# Patient Record
Sex: FEMALE | Race: WHITE | HISPANIC OR LATINO | Employment: FULL TIME | URBAN - NONMETROPOLITAN AREA
[De-identification: names, ages, dates, MRNs, and addresses within clinical notes are randomized per-mention and may not be internally consistent; named-entity substitution may affect disease eponyms.]

---

## 2020-07-05 ENCOUNTER — APPOINTMENT (EMERGENCY)
Dept: RADIOLOGY | Facility: HOSPITAL | Age: 40
End: 2020-07-05
Payer: COMMERCIAL

## 2020-07-05 ENCOUNTER — HOSPITAL ENCOUNTER (INPATIENT)
Facility: HOSPITAL | Age: 40
LOS: 1 days | Discharge: HOME | End: 2020-07-06
Attending: EMERGENCY MEDICINE | Admitting: FAMILY MEDICINE
Payer: COMMERCIAL

## 2020-07-05 DIAGNOSIS — E87.2 LACTIC ACIDOSIS: ICD-10-CM

## 2020-07-05 DIAGNOSIS — F10.230 ALCOHOL WITHDRAWAL SYNDROME WITHOUT COMPLICATION (HCC): Primary | ICD-10-CM

## 2020-07-05 PROBLEM — Z72.0 TOBACCO ABUSE: Status: ACTIVE | Noted: 2020-07-05

## 2020-07-05 PROBLEM — E87.20 LACTIC ACIDOSIS: Status: ACTIVE | Noted: 2020-07-05

## 2020-07-05 PROBLEM — R74.01 TRANSAMINITIS: Status: ACTIVE | Noted: 2020-07-05

## 2020-07-05 PROBLEM — F10.239 ALCOHOL WITHDRAWAL SYNDROME WITH COMPLICATION (HCC): Status: ACTIVE | Noted: 2020-07-05

## 2020-07-05 PROBLEM — I10 ESSENTIAL HYPERTENSION: Status: ACTIVE | Noted: 2020-07-05

## 2020-07-05 PROBLEM — F10.939 ALCOHOL WITHDRAWAL SYNDROME WITH COMPLICATION (HCC): Status: ACTIVE | Noted: 2020-07-05

## 2020-07-05 PROBLEM — R11.2 INTRACTABLE NAUSEA AND VOMITING: Status: ACTIVE | Noted: 2020-07-05

## 2020-07-05 LAB
ALBUMIN SERPL BCP-MCNC: 3.9 G/DL (ref 3.5–5)
ALP SERPL-CCNC: 105 U/L (ref 46–116)
ALT SERPL W P-5'-P-CCNC: 74 U/L (ref 12–78)
ANION GAP SERPL CALCULATED.3IONS-SCNC: 21 MMOL/L (ref 4–13)
AST SERPL W P-5'-P-CCNC: 195 U/L (ref 5–45)
BASOPHILS # BLD MANUAL: 0 THOUSAND/UL (ref 0–0.1)
BASOPHILS NFR MAR MANUAL: 0 % (ref 0–1)
BILIRUB SERPL-MCNC: 1.56 MG/DL (ref 0.2–1)
BUN SERPL-MCNC: 6 MG/DL (ref 5–25)
CALCIUM SERPL-MCNC: 8 MG/DL (ref 8.3–10.1)
CHLORIDE SERPL-SCNC: 94 MMOL/L (ref 100–108)
CK MB SERPL-MCNC: 0.6 NG/ML (ref 0–5)
CK MB SERPL-MCNC: <1 % (ref 0–2.5)
CK SERPL-CCNC: 182 U/L (ref 26–192)
CO2 SERPL-SCNC: 21 MMOL/L (ref 21–32)
CREAT SERPL-MCNC: 1.12 MG/DL (ref 0.6–1.3)
EOSINOPHIL # BLD MANUAL: 0 THOUSAND/UL (ref 0–0.4)
EOSINOPHIL NFR BLD MANUAL: 0 % (ref 0–6)
ERYTHROCYTE [DISTWIDTH] IN BLOOD BY AUTOMATED COUNT: 14.3 % (ref 11.6–15.1)
ETHANOL SERPL-MCNC: <3 MG/DL (ref 0–3)
GFR SERPL CREATININE-BSD FRML MDRD: 62 ML/MIN/1.73SQ M
GLUCOSE SERPL-MCNC: 126 MG/DL (ref 65–140)
HCT VFR BLD AUTO: 41.2 % (ref 34.8–46.1)
HGB BLD-MCNC: 14.7 G/DL (ref 11.5–15.4)
LACTATE SERPL-SCNC: 1.9 MMOL/L (ref 0.5–2)
LACTATE SERPL-SCNC: 7.4 MMOL/L (ref 0.5–2)
LIPASE SERPL-CCNC: 206 U/L (ref 73–393)
LYMPHOCYTES # BLD AUTO: 1.9 THOUSAND/UL (ref 0.6–4.47)
LYMPHOCYTES # BLD AUTO: 32 % (ref 14–44)
MCH RBC QN AUTO: 33.7 PG (ref 26.8–34.3)
MCHC RBC AUTO-ENTMCNC: 35.7 G/DL (ref 31.4–37.4)
MCV RBC AUTO: 95 FL (ref 82–98)
MONOCYTES # BLD AUTO: 0.59 THOUSAND/UL (ref 0–1.22)
MONOCYTES NFR BLD: 10 % (ref 4–12)
NEUTROPHILS # BLD MANUAL: 3.44 THOUSAND/UL (ref 1.85–7.62)
NEUTS SEG NFR BLD AUTO: 58 % (ref 43–75)
NRBC BLD AUTO-RTO: 0 /100 WBCS
PLATELET # BLD AUTO: 211 THOUSANDS/UL (ref 149–390)
PLATELET BLD QL SMEAR: ADEQUATE
PMV BLD AUTO: 9.3 FL (ref 8.9–12.7)
POTASSIUM SERPL-SCNC: 4 MMOL/L (ref 3.5–5.3)
PROT SERPL-MCNC: 8.2 G/DL (ref 6.4–8.2)
RBC # BLD AUTO: 4.36 MILLION/UL (ref 3.81–5.12)
RBC MORPH BLD: NORMAL
SODIUM SERPL-SCNC: 136 MMOL/L (ref 136–145)
TOTAL CELLS COUNTED SPEC: 100
WBC # BLD AUTO: 5.93 THOUSAND/UL (ref 4.31–10.16)

## 2020-07-05 PROCEDURE — 96374 THER/PROPH/DIAG INJ IV PUSH: CPT

## 2020-07-05 PROCEDURE — 85027 COMPLETE CBC AUTOMATED: CPT | Performed by: EMERGENCY MEDICINE

## 2020-07-05 PROCEDURE — 94760 N-INVAS EAR/PLS OXIMETRY 1: CPT

## 2020-07-05 PROCEDURE — 36415 COLL VENOUS BLD VENIPUNCTURE: CPT | Performed by: EMERGENCY MEDICINE

## 2020-07-05 PROCEDURE — 80053 COMPREHEN METABOLIC PANEL: CPT | Performed by: EMERGENCY MEDICINE

## 2020-07-05 PROCEDURE — 96375 TX/PRO/DX INJ NEW DRUG ADDON: CPT

## 2020-07-05 PROCEDURE — 85007 BL SMEAR W/DIFF WBC COUNT: CPT | Performed by: EMERGENCY MEDICINE

## 2020-07-05 PROCEDURE — 83605 ASSAY OF LACTIC ACID: CPT | Performed by: EMERGENCY MEDICINE

## 2020-07-05 PROCEDURE — 99223 1ST HOSP IP/OBS HIGH 75: CPT | Performed by: FAMILY MEDICINE

## 2020-07-05 PROCEDURE — 82553 CREATINE MB FRACTION: CPT | Performed by: EMERGENCY MEDICINE

## 2020-07-05 PROCEDURE — 82550 ASSAY OF CK (CPK): CPT | Performed by: EMERGENCY MEDICINE

## 2020-07-05 PROCEDURE — 71045 X-RAY EXAM CHEST 1 VIEW: CPT

## 2020-07-05 PROCEDURE — 83690 ASSAY OF LIPASE: CPT | Performed by: EMERGENCY MEDICINE

## 2020-07-05 PROCEDURE — 80320 DRUG SCREEN QUANTALCOHOLS: CPT | Performed by: EMERGENCY MEDICINE

## 2020-07-05 PROCEDURE — 99285 EMERGENCY DEPT VISIT HI MDM: CPT

## 2020-07-05 PROCEDURE — 96361 HYDRATE IV INFUSION ADD-ON: CPT

## 2020-07-05 PROCEDURE — 99285 EMERGENCY DEPT VISIT HI MDM: CPT | Performed by: EMERGENCY MEDICINE

## 2020-07-05 RX ORDER — MAGNESIUM 30 MG
30 TABLET ORAL DAILY
COMMUNITY

## 2020-07-05 RX ORDER — CHLORDIAZEPOXIDE HYDROCHLORIDE 5 MG/1
5 CAPSULE, GELATIN COATED ORAL EVERY 8 HOURS SCHEDULED
Status: DISCONTINUED | OUTPATIENT
Start: 2020-07-05 | End: 2020-07-06 | Stop reason: HOSPADM

## 2020-07-05 RX ORDER — LORAZEPAM 2 MG/ML
2 INJECTION INTRAMUSCULAR ONCE
Status: COMPLETED | OUTPATIENT
Start: 2020-07-05 | End: 2020-07-05

## 2020-07-05 RX ORDER — THIAMINE MONONITRATE (VIT B1) 100 MG
100 TABLET ORAL DAILY
Status: DISCONTINUED | OUTPATIENT
Start: 2020-07-06 | End: 2020-07-06 | Stop reason: HOSPADM

## 2020-07-05 RX ORDER — LISINOPRIL 5 MG/1
5 TABLET ORAL DAILY
COMMUNITY
Start: 2019-11-26

## 2020-07-05 RX ORDER — FOLIC ACID 1 MG/1
1 TABLET ORAL DAILY
Status: DISCONTINUED | OUTPATIENT
Start: 2020-07-06 | End: 2020-07-06 | Stop reason: HOSPADM

## 2020-07-05 RX ORDER — DIPHENHYDRAMINE HYDROCHLORIDE 50 MG/ML
25 INJECTION INTRAMUSCULAR; INTRAVENOUS ONCE
Status: COMPLETED | OUTPATIENT
Start: 2020-07-05 | End: 2020-07-05

## 2020-07-05 RX ORDER — LANOLIN ALCOHOL/MO/W.PET/CERES
3 CREAM (GRAM) TOPICAL ONCE
Status: COMPLETED | OUTPATIENT
Start: 2020-07-05 | End: 2020-07-05

## 2020-07-05 RX ORDER — ONDANSETRON 2 MG/ML
4 INJECTION INTRAMUSCULAR; INTRAVENOUS EVERY 6 HOURS PRN
Status: DISCONTINUED | OUTPATIENT
Start: 2020-07-05 | End: 2020-07-06 | Stop reason: HOSPADM

## 2020-07-05 RX ORDER — SODIUM CHLORIDE 9 MG/ML
125 INJECTION, SOLUTION INTRAVENOUS CONTINUOUS
Status: DISCONTINUED | OUTPATIENT
Start: 2020-07-05 | End: 2020-07-06 | Stop reason: HOSPADM

## 2020-07-05 RX ORDER — METOCLOPRAMIDE HYDROCHLORIDE 5 MG/ML
10 INJECTION INTRAMUSCULAR; INTRAVENOUS ONCE
Status: COMPLETED | OUTPATIENT
Start: 2020-07-05 | End: 2020-07-05

## 2020-07-05 RX ORDER — ONDANSETRON 2 MG/ML
4 INJECTION INTRAMUSCULAR; INTRAVENOUS ONCE
Status: COMPLETED | OUTPATIENT
Start: 2020-07-05 | End: 2020-07-05

## 2020-07-05 RX ORDER — THIAMINE MONONITRATE (VIT B1) 100 MG
100 TABLET ORAL DAILY
COMMUNITY
End: 2021-03-04

## 2020-07-05 RX ADMIN — CHLORDIAZEPOXIDE HYDROCHLORIDE 5 MG: 5 CAPSULE ORAL at 21:24

## 2020-07-05 RX ADMIN — SODIUM CHLORIDE 125 ML/HR: 0.9 INJECTION, SOLUTION INTRAVENOUS at 19:17

## 2020-07-05 RX ADMIN — LORAZEPAM 2 MG: 2 INJECTION INTRAMUSCULAR; INTRAVENOUS at 16:34

## 2020-07-05 RX ADMIN — SODIUM CHLORIDE 2000 ML: 0.9 INJECTION, SOLUTION INTRAVENOUS at 16:41

## 2020-07-05 RX ADMIN — DIPHENHYDRAMINE HYDROCHLORIDE 25 MG: 50 INJECTION, SOLUTION INTRAMUSCULAR; INTRAVENOUS at 16:36

## 2020-07-05 RX ADMIN — ONDANSETRON 4 MG: 2 INJECTION INTRAMUSCULAR; INTRAVENOUS at 16:34

## 2020-07-05 RX ADMIN — METOCLOPRAMIDE HYDROCHLORIDE 10 MG: 5 INJECTION INTRAMUSCULAR; INTRAVENOUS at 16:41

## 2020-07-05 RX ADMIN — MELATONIN 3 MG: at 23:19

## 2020-07-05 NOTE — ASSESSMENT & PLAN NOTE
Patient presented with intractable nausea and vomiting since yesterday  Continue IV fluid hydration for now  She is asking for food and hence he will place on a full liquid diet with toast and crackers observe for now    Advance diet as tolerated

## 2020-07-05 NOTE — ED NOTES
Pt resting comfortably  Feels much better  No tremors, nausea or contractures noted        Ayse Vazquez RN  07/05/20 2267

## 2020-07-05 NOTE — ASSESSMENT & PLAN NOTE
Patient has known history of essential hypertension and takes lisinopril 5 mg daily    Will hold for now due to lactic acidosis and continue hydration

## 2020-07-05 NOTE — ED PROVIDER NOTES
History  Chief Complaint   Patient presents with    Withdrawal - Alcohol     pt has tired to quit  pt is non compliant with her treatment     Patient who has been drinking daily recently  Last drink was last night around midnight  Now states for the last hour she started having shakes  Feels anxious  Broke out in sweats  Has some nausea but no vomiting  States she feels very tense  No headaches  No chest pain  No shortness of breath  No abdominal pain or back pain  Has Librium but did not take it  Significant other is here now  States she did drink last night but did not drink as much alcohol as she normally does  Patient states she has had a history of alcohol withdrawal and has been admitted in the past with similar symptoms  CIWA score on arrival of 13  History provided by:  Patient   used: No    Anxiety   Presenting symptoms: agitation    Presenting symptoms: no hallucinations, no homicidal ideas, no paranoid behavior, no self-mutilation and no suicidal thoughts    Degree of incapacity (severity):  Mild  Onset quality:  Sudden  Duration:  1 hour  Timing:  Constant  Progression:  Worsening  Chronicity:  New  Context: alcohol use    Context: not drug abuse and not recent medication change    Treatment compliance:  Untreated  Relieved by:  Nothing  Worsened by:  Nothing  Ineffective treatments:  None tried  Associated symptoms: anxiety, hyperventilating and irritability    Associated symptoms: no abdominal pain, no chest pain, no decreased need for sleep, not distractible, no fatigue, no headaches, no hypersomnia and no school problems        Prior to Admission Medications   Prescriptions Last Dose Informant Patient Reported?  Taking?   lisinopril (ZESTRIL) 5 mg tablet   Yes Yes   Sig: Take 5 mg by mouth daily      Facility-Administered Medications: None       Past Medical History:   Diagnosis Date    Alcohol abuse     Hypertension        Past Surgical History: Procedure Laterality Date    ANKLE SURGERY      BREAST BIOPSY         Family History   Problem Relation Age of Onset    Hypertension Father      I have reviewed and agree with the history as documented  E-Cigarette/Vaping     E-Cigarette/Vaping Substances     Social History     Tobacco Use    Smoking status: Current Every Day Smoker     Packs/day: 1 00     Types: Cigarettes   Substance Use Topics    Alcohol use: Yes     Frequency: 4 or more times a week     Comment: One pint of vodka per day    Drug use: Never       Review of Systems   Constitutional: Positive for irritability  Negative for chills, fatigue and fever  HENT: Negative for ear pain, hearing loss, sore throat, trouble swallowing and voice change  Eyes: Negative for pain and discharge  Respiratory: Negative for cough, shortness of breath and wheezing  Cardiovascular: Negative for chest pain and palpitations  Gastrointestinal: Negative for abdominal pain, blood in stool, constipation, diarrhea, nausea and vomiting  Genitourinary: Negative for dysuria, flank pain, frequency and hematuria  Musculoskeletal: Negative for joint swelling, neck pain and neck stiffness  Skin: Negative for rash and wound  Neurological: Positive for tremors and weakness  Negative for dizziness, seizures, syncope, facial asymmetry and headaches  Psychiatric/Behavioral: Positive for agitation  Negative for hallucinations, homicidal ideas, paranoia, self-injury and suicidal ideas  The patient is nervous/anxious  All other systems reviewed and are negative  Physical Exam  Physical Exam   Constitutional: She is oriented to person, place, and time  She appears well-developed and well-nourished  She appears distressed (Moderate distress)  Hands clenched and arms flexed at the elbow  Stiff  Not seizing however  Patient talking and answering questions  HENT:   Head: Normocephalic and atraumatic     Right Ear: External ear normal    Left Ear: External ear normal    Eyes: Pupils are equal, round, and reactive to light  Conjunctivae and EOM are normal    Neck: Normal range of motion  Neck supple  Cardiovascular: Normal rate, regular rhythm and normal heart sounds  No murmur heard  Pulmonary/Chest: Effort normal and breath sounds normal  She has no wheezes  She has no rales  Hyperventilating  Abdominal: Soft  Bowel sounds are normal  She exhibits no distension  There is no tenderness  There is no rebound and no guarding  Musculoskeletal: Normal range of motion  She exhibits no deformity  Neurological: She is alert and oriented to person, place, and time  No cranial nerve deficit  Skin: Skin is warm  No rash noted  Diaphoretic with beads of sweat throughout her face dripping  Psychiatric: She has a normal mood and affect  Her behavior is normal    Nursing note and vitals reviewed  Vital Signs  ED Triage Vitals [07/05/20 1652]   Temperature Pulse Respirations Blood Pressure SpO2   98 2 °F (36 8 °C) 86 (!) 24 145/89 99 %      Temp src Heart Rate Source Patient Position - Orthostatic VS BP Location FiO2 (%)   -- -- -- -- --      Pain Score       (S) Worst Possible Pain           Vitals:    07/05/20 1652   BP: 145/89   Pulse: 86         Visual Acuity      ED Medications  Medications   sodium chloride 0 9 % bolus 2,000 mL (2,000 mL Intravenous New Bag 7/5/20 1641)   LORazepam (ATIVAN) injection 2 mg (2 mg Intravenous Given 7/5/20 1634)   ondansetron (ZOFRAN) injection 4 mg (4 mg Intravenous Given 7/5/20 1634)   metoclopramide (REGLAN) injection 10 mg (10 mg Intravenous Given 7/5/20 1641)   diphenhydrAMINE (BENADRYL) injection 25 mg (25 mg Intravenous Given 7/5/20 1636)       Diagnostic Studies  Results Reviewed     Procedure Component Value Units Date/Time    Ethanol [[id number]] Collected:  07/05/20 1741    Lab Status:   In process Specimen:  Blood from Arm, Left Updated:  07/05/20 1752    CKMB [[id number]]  (Normal) Collected:  07/05/20 1646    Lab Status:  Final result Specimen:  Blood from Arm, Left Updated:  07/05/20 1742     CK-MB Index <1 0 %      CK-MB 0 6 ng/mL     CBC and differential [[id number]] Collected:  07/05/20 1646    Lab Status:  Final result Specimen:  Blood from Arm, Left Updated:  07/05/20 1721     WBC 5 93 Thousand/uL      RBC 4 36 Million/uL      Hemoglobin 14 7 g/dL      Hematocrit 41 2 %      MCV 95 fL      MCH 33 7 pg      MCHC 35 7 g/dL      RDW 14 3 %      MPV 9 3 fL      Platelets 417 Thousands/uL      nRBC 0 /100 WBCs     Narrative: This is an appended report  These results have been appended to a previously verified report      UA w Reflex to Microscopic w Reflex to Culture [[id number]]     Lab Status:  No result Specimen:  Urine     Rapid drug screen, urine [[id number]]     Lab Status:  No result Specimen:  Urine     POCT pregnancy, urine [[id number]]     Lab Status:  No result     Comprehensive metabolic panel [[id number]]  (Abnormal) Collected:  07/05/20 1646    Lab Status:  Final result Specimen:  Blood from Arm, Left Updated:  07/05/20 1719     Sodium 136 mmol/L      Potassium 4 0 mmol/L      Chloride 94 mmol/L      CO2 21 mmol/L      ANION GAP 21 mmol/L      BUN 6 mg/dL      Creatinine 1 12 mg/dL      Glucose 126 mg/dL      Calcium 8 0 mg/dL       U/L      ALT 74 U/L      Alkaline Phosphatase 105 U/L      Total Protein 8 2 g/dL      Albumin 3 9 g/dL      Total Bilirubin 1 56 mg/dL      eGFR 62 ml/min/1 73sq m     Narrative:       [place] guidelines for Chronic Kidney Disease (CKD):     Stage 1 with normal or high GFR (GFR > 90 mL/min/1 73 square meters)    Stage 2 Mild CKD (GFR = 60-89 mL/min/1 73 square meters)    Stage 3A Moderate CKD (GFR = 45-59 mL/min/1 73 square meters)    Stage 3B Moderate CKD (GFR = 30-44 mL/min/1 73 square meters)    Stage 4 Severe CKD (GFR = 15-29 mL/min/1 73 square meters)    Stage 5 End Stage CKD (GFR <15 mL/min/1 73 square meters)  Note: GFR calculation is accurate only with a steady state creatinine    Lipase [[id number]]  (Normal) Collected:  07/05/20 1646    Lab Status:  Final result Specimen:  Blood from Arm, Left Updated:  07/05/20 1719     Lipase 206 u/L     Lactic acid [[id number]]  (Abnormal) Collected:  07/05/20 1646    Lab Status:  Final result Specimen:  Blood from Arm, Left Updated:  07/05/20 1716     LACTIC ACID 7 4 mmol/L     Narrative:       Result may be elevated if tourniquet was used during collection  Lactic acid 2 Hours [[id number]]     Lab Status:  No result Specimen:  Blood     CK Total with Reflex CKMB [[id number]]  (Normal) Collected:  07/05/20 1646    Lab Status:  Final result Specimen:  Blood from Arm, Left Updated:  07/05/20 1708     Total  U/L                  XR chest 1 view portable    (Results Pending)              Procedures  ECG 12 Lead Documentation Only  Date/Time: 7/5/2020 6:25 PM  Performed by: Tee Campos MD  Authorized by: Tee Campos MD     ECG reviewed by me, the ED Provider: yes    Patient location:  ED  Previous ECG:     Previous ECG:  Unavailable  Rate:     ECG rate:  80  Rhythm:     Rhythm: sinus rhythm    Ectopy:     Ectopy: none    QRS:     QRS axis:  Normal             ED Course  ED Course as of Jul 05 1825   Sun Jul 05, 2020   [address] Patient seen  Much calmer now  Heart rate in the low 100s  Still slightly tremulous  Arms are no longer stiff and shaking  Patient no longer hyperventilating  1721 Lactic acid noted to be 7 4  Doubt this is an infectious etiology  Most likely this is due to alcohol withdrawal           US AUDIT      Most Recent Value   Initial Alcohol Screen: US AUDIT-C    1  How often do you have a drink containing alcohol? 6 Filed at: 07/05/2020 1638   2  How many drinks containing alcohol do you have on a typical day you are drinking?   2 [1 pint of vodka a day] Filed at: 07/05/2020 1638   3a  Male UNDER 65:  How often do you have five or more drinks on one occasion? 0 Filed at: 07/05/2020 1638   3b  FEMALE Any Age, or MALE 65+: How often do you have 4 or more drinks on one occassion? 6 Filed at: 07/05/2020 1638   Audit-C Score  (!) 14 Filed at: 07/05/2020 1638   Full Alcohol Screen: US AUDIT   4  How often during the last year have you found that you were not able to stop drinking once you had started? 4 Filed at: 07/05/2020 1638   5  How often during past year have you failed to do what was normally expected of you because of drinking? 3 Filed at: 07/05/2020 1638   6  How often in past year have you needed a first drink in the morning to get yourself going after a heavy drinking session? 3 Filed at: 07/05/2020 1638   7  How often in past year have you had feeling of guilt or remorse after drinking? 1 Filed at: 07/05/2020 1638   8  How often in past year have you been unable to remember what happened night before because you had been drinking? 3 Filed at: 07/05/2020 1638   9  Have you or someone else been injured as a result of your drinking? 0 Filed at: 07/05/2020 1638   10  Has a relative, friend, doctor or other health worker been concerned about your drinking and suggested you cut down?   0 Filed at: 07/05/2020 1638   AUDIT Total Score  (!) 28 Filed at: 07/05/2020 1638                  ADRIEL/DAST-10      Most Recent Value   How many times in the past year have you    Used an illegal drug or used a prescription medication for non-medical reasons?   Never Filed at: 07/05/2020 1628                                MDM      Disposition  Final diagnoses:   Alcohol withdrawal syndrome without complication (HCC)   Lactic acidosis     Time reflects when diagnosis was documented in both MDM as applicable and the Disposition within this note     Time User Action Codes Description Comment    7/5/2020  5:30 PM Isis Figueroa Add [L52 840] Alcohol withdrawal syndrome without complication ([facility] [address] )     9/0/4952  5:30 PM Olivia Pryor Add [E87 2] Lactic acidosis ED Disposition     ED Disposition Condition Date/Time Comment    Admit Stable Sun Jul 5, 2020  5:35 PM Case was discussed with Dr Prince Garnett and the patient's admission status was agreed to be to the service of Dr Prince Garnett   Follow-up Information    None         Patient's Medications   Discharge Prescriptions    No medications on file     No discharge procedures on file      PDMP Review     None          ED Provider  Electronically Signed by           Ralph Simpson MD  07/05/20 48 Yogesh Granados MD  07/05/20 7113

## 2020-07-05 NOTE — ASSESSMENT & PLAN NOTE
Patient has known history of excessive alcohol abuse  She states she drinks a pt of vodka daily  She had her last drink yesterday evening  She has not had much to eat or drink since then because she has been nauseous  She was brought to the emergency room after her significant other called the ambulance due to her being very stiff and rigid and weak    Continue IV fluid hydration  Placed on CIWA protocol  Patient has a known history of seizures due to alcohol withdrawal in the past  Currently she denies any hallucinations or seizures  Place on Librium 5 mg every 8 hours  Offered was referral for alcohol rehab program but she does not seem interested at this time

## 2020-07-05 NOTE — ASSESSMENT & PLAN NOTE
Patient presented with acute lactic acidosis secondary to intractable nausea and vomiting , dehydration due to poor oral intake and alcohol withdrawal   Lactic acid was 7 4 on presentation  There is no signs of any infection at this time  Received fluid bolus of 2 L   recheck lactic acid after that    Continue IV fluid hydration for now

## 2020-07-05 NOTE — H&P
H&P- Mauro Vera 1980, 35 y o  female MRN: [id number]    Unit/Bed#: ED 04 Encounter: [id number]    Primary Care Provider: No primary care provider on file  Date and time admitted to hospital: 7/5/2020  4:26 PM        Alcohol withdrawal syndrome with complication [facility])  Assessment & Plan  Patient has known history of excessive alcohol abuse  She states she drinks a pt of vodka daily  She had her last drink yesterday evening  She has not had much to eat or drink since then because she has been nauseous  She was brought to the emergency room after her significant other called the ambulance due to her being very stiff and rigid and weak  Continue IV fluid hydration  Placed on CIWA protocol  Patient has a known history of seizures due to alcohol withdrawal in the past  Currently she denies any hallucinations or seizures  Place on Librium 5 mg every 8 hours  Offered was referral for alcohol rehab program but she does not seem interested at this time    * Lactic acidosis  Assessment & Plan  Patient presented with acute lactic acidosis secondary to intractable nausea and vomiting , dehydration due to poor oral intake and alcohol withdrawal   Lactic acid was 7 4 on presentation  There is no signs of any infection at this time  Received fluid bolus of 2 L   recheck lactic acid after that  Continue IV fluid hydration for now    Intractable nausea and vomiting  Assessment & Plan  Patient presented with intractable nausea and vomiting since yesterday  Continue IV fluid hydration for now  She is asking for food and hence he will place on a full liquid diet with toast and crackers observe for now  Advance diet as tolerated    Transaminitis  Assessment & Plan  Secondary to alcohol abuse  Avoid hepatotoxic agents and hypotension  Recheck CMP tomorrow  Tobacco abuse  Assessment & Plan  Counseled on smoking cessation    Patient refused nicotine replacement therapy    Essential hypertension  Assessment & Plan  Patient has known history of essential hypertension and takes lisinopril 5 mg daily  Will hold for now due to lactic acidosis and continue hydration      VTE Prophylaxis: Pharmacologic VTE Prophylaxis contraindicated due to Low risk  / sequential compression device   Code Status:  Full code  POLST: There is no POLST form on file for this patient (pre-hospital)  Discussion with family:  Discussed with significant other    Anticipated Length of Stay:  Patient will be admitted on an Inpatient basis with an anticipated length of stay of  more than 2 midnights  Justification for Hospital Stay:  Lactic acidosis and alcohol withdrawal    Total Time for Visit, including Counseling / Coordination of Care: 1 hour  Greater than 50% of this total time spent on direct patient counseling and coordination of care  Chief Complaint:   Alcohol withdrawal    History of Present Illness:    Mauro Dawkins is a 35 y o  female who presents with alcohol withdrawal   Patient usually drinks 1 pt of vodka per day  She states her last drink was last night  She has had very poor oral intake for the last few days due to having intractable nausea and vomiting due to alcohol withdrawal   She denies any seizure-like activity or hallucinations at this time  She is oriented to person and place but somnolent and lethargic  She states that she was feeling very rigid this with morning and felt like she was having a muscle spasm which was also accompanied by diffuse diaphoresis  Of note patient has a known history of alcohol withdrawal seizures and admissions due to alcohol withdrawal in the past   She currently denies any chest pain or shortness of breath  Patient CIWA score was 13 on arrival   Denies any fevers or chills or abdominal pain or diarrhea  Initially patient was noted to be hyperventilating in the emergency room and retching    Now she seems more comfortable    Review of Systems:    Review of Systems   Constitutional: Positive for appetite change and fatigue  Negative for chills and fever  HENT: Negative for hearing loss, sore throat and trouble swallowing  Eyes: Negative for photophobia, discharge and visual disturbance  Respiratory: Negative for chest tightness and shortness of breath  Cardiovascular: Negative for chest pain and palpitations  Gastrointestinal: Negative for abdominal pain, blood in stool and vomiting  Endocrine: Negative for polydipsia and polyuria  Genitourinary: Negative for difficulty urinating, dysuria, flank pain and hematuria  Musculoskeletal: Negative for back pain and gait problem  Skin: Negative for rash  Allergic/Immunologic: Negative for environmental allergies and food allergies  Neurological: Positive for weakness  Negative for dizziness, seizures, syncope and headaches  Hematological: Does not bruise/bleed easily  Psychiatric/Behavioral: Positive for behavioral problems  The patient is nervous/anxious  All other systems reviewed and are negative  Past Medical and Surgical History:     Past Medical History:   Diagnosis Date    Alcohol abuse     Hypertension        Past Surgical History:   Procedure Laterality Date    ANKLE SURGERY      BREAST BIOPSY         Meds/Allergies:    Prior to Admission medications    Medication Sig Start Date End Date Taking? Authorizing Provider   lisinopril (ZESTRIL) 5 mg tablet Take 5 mg by mouth daily 11/26/19  Yes Historical Provider, MD     I have reviewed home medications with patient personally  Allergies:    Allergies   Allergen Reactions    Tylenol [Acetaminophen]      R/t liver probs       Social History:     Marital Status: /Civil Union     Social History     Substance and Sexual Activity   Alcohol Use Yes    Frequency: 4 or more times a week    Comment: One pint of vodka per day     Social History     Tobacco Use   Smoking Status Current Every Day Smoker    Packs/day: 1 00    Types: Cigarettes     Social History     Substance and Sexual Activity   Drug Use Never       Family History:    Family History   Problem Relation Age of Onset    Hypertension Father        Physical Exam:     Vitals:   Blood Pressure: 145/89 (07/05/20 1652)  Pulse: 86 (07/05/20 1652)  Temperature: 98 2 °F (36 8 °C) (07/05/20 1652)  Respirations: (!) 24 (07/05/20 1652)  SpO2: 99 % (07/05/20 1652)    Physical Exam   Constitutional: She is oriented to person, place, and time  She appears well-developed and well-nourished  She appears distressed  HENT:   Head: Normocephalic and atraumatic  Right Ear: External ear normal    Left Ear: External ear normal    Mouth/Throat: Oropharynx is clear and moist    Eyes: Pupils are equal, round, and reactive to light  Conjunctivae and EOM are normal    Neck: Normal range of motion  Neck supple  Cardiovascular: Normal rate, regular rhythm, normal heart sounds and intact distal pulses  Pulmonary/Chest: Effort normal and breath sounds normal    Abdominal: Soft  Bowel sounds are normal  She exhibits no mass  There is no tenderness  There is no rebound and no guarding  Genitourinary:   Genitourinary Comments: deferred   Musculoskeletal: Normal range of motion  No rigidity noted   Neurological: She is oriented to person, place, and time  She has normal reflexes  Cranial nerves 2-12 are normal   Somnolent but arousable  Oriented to person and place    Skin: Skin is warm and dry  No rash noted  Psychiatric:   Flat affect and somnolent   Nursing note and vitals reviewed  Additional Data:     Lab Results: I have personally reviewed pertinent reports        Results from last 7 days   Lab Units 07/05/20  1646   WBC Thousand/uL 5 93   HEMOGLOBIN g/dL 14 7   HEMATOCRIT % 41 2   PLATELETS Thousands/uL 211   LYMPHO PCT % 32   MONO PCT % 10   EOS PCT % 0     Results from last 7 days   Lab Units 07/05/20  1646   SODIUM mmol/L 136   POTASSIUM mmol/L 4 0   CHLORIDE mmol/L 94*   CO2 mmol/L 21   BUN mg/dL 6 CREATININE mg/dL 1 12   ANION GAP mmol/L 21*   CALCIUM mg/dL 8 0*   ALBUMIN g/dL 3 9   TOTAL BILIRUBIN mg/dL 1 56*   ALK PHOS U/L 105   ALT U/L 74   AST U/L 195*   GLUCOSE RANDOM mg/dL 126                 Results from last 7 days   Lab Units 07/05/20  1646   LACTIC ACID mmol/L 7 4*       Imaging: I have personally reviewed pertinent reports  XR chest 1 view portable    (Results Pending)       EKG, Pathology, and Other Studies Reviewed on Admission:   · EKG:  Sinus rhythm    Allscripts / Epic Records Reviewed: Yes     ** Please Note: This note has been constructed using a voice recognition system   **

## 2020-07-06 VITALS
TEMPERATURE: 99.5 F | WEIGHT: 128.31 LBS | HEIGHT: 59 IN | SYSTOLIC BLOOD PRESSURE: 140 MMHG | HEART RATE: 76 BPM | BODY MASS INDEX: 25.87 KG/M2 | OXYGEN SATURATION: 98 % | DIASTOLIC BLOOD PRESSURE: 98 MMHG | RESPIRATION RATE: 16 BRPM

## 2020-07-06 PROBLEM — R50.9 FEVER: Status: ACTIVE | Noted: 2020-07-06

## 2020-07-06 LAB
ALBUMIN SERPL BCP-MCNC: 2.8 G/DL (ref 3.5–5)
ALP SERPL-CCNC: 79 U/L (ref 46–116)
ALT SERPL W P-5'-P-CCNC: 49 U/L (ref 12–78)
AMPHETAMINES SERPL QL SCN: NEGATIVE
ANION GAP SERPL CALCULATED.3IONS-SCNC: 6 MMOL/L (ref 4–13)
AST SERPL W P-5'-P-CCNC: 117 U/L (ref 5–45)
B-HCG SERPL-ACNC: <2 MIU/ML
BARBITURATES UR QL: NEGATIVE
BENZODIAZ UR QL: NEGATIVE
BILIRUB SERPL-MCNC: 1 MG/DL (ref 0.2–1)
BILIRUB UR QL STRIP: NEGATIVE
BUN SERPL-MCNC: 4 MG/DL (ref 5–25)
CALCIUM SERPL-MCNC: 6.1 MG/DL (ref 8.3–10.1)
CHLORIDE SERPL-SCNC: 103 MMOL/L (ref 100–108)
CLARITY UR: CLEAR
CO2 SERPL-SCNC: 28 MMOL/L (ref 21–32)
COCAINE UR QL: NEGATIVE
COLOR UR: YELLOW
CREAT SERPL-MCNC: 0.73 MG/DL (ref 0.6–1.3)
ERYTHROCYTE [DISTWIDTH] IN BLOOD BY AUTOMATED COUNT: 14.1 % (ref 11.6–15.1)
GFR SERPL CREATININE-BSD FRML MDRD: 103 ML/MIN/1.73SQ M
GLUCOSE SERPL-MCNC: 108 MG/DL (ref 65–140)
GLUCOSE UR STRIP-MCNC: ABNORMAL MG/DL
HCT VFR BLD AUTO: 32.4 % (ref 34.8–46.1)
HGB BLD-MCNC: 11.4 G/DL (ref 11.5–15.4)
HGB UR QL STRIP.AUTO: NEGATIVE
KETONES UR STRIP-MCNC: NEGATIVE MG/DL
LEUKOCYTE ESTERASE UR QL STRIP: NEGATIVE
MCH RBC QN AUTO: 34.5 PG (ref 26.8–34.3)
MCHC RBC AUTO-ENTMCNC: 35.2 G/DL (ref 31.4–37.4)
MCV RBC AUTO: 98 FL (ref 82–98)
METHADONE UR QL: NEGATIVE
NITRITE UR QL STRIP: NEGATIVE
OPIATES UR QL SCN: NEGATIVE
OXYCODONE+OXYMORPHONE UR QL SCN: NEGATIVE
PCP UR QL: NEGATIVE
PH UR STRIP.AUTO: 7.5 [PH]
PLATELET # BLD AUTO: 103 THOUSANDS/UL (ref 149–390)
PMV BLD AUTO: 9.2 FL (ref 8.9–12.7)
POTASSIUM SERPL-SCNC: 3.2 MMOL/L (ref 3.5–5.3)
PROT SERPL-MCNC: 5.8 G/DL (ref 6.4–8.2)
PROT UR STRIP-MCNC: NEGATIVE MG/DL
RBC # BLD AUTO: 3.3 MILLION/UL (ref 3.81–5.12)
SODIUM SERPL-SCNC: 137 MMOL/L (ref 136–145)
SP GR UR STRIP.AUTO: 1.01 (ref 1–1.03)
THC UR QL: POSITIVE
TSH SERPL DL<=0.05 MIU/L-ACNC: 0.98 UIU/ML (ref 0.36–3.74)
UROBILINOGEN UR QL STRIP.AUTO: 1 E.U./DL
WBC # BLD AUTO: 4.41 THOUSAND/UL (ref 4.31–10.16)

## 2020-07-06 PROCEDURE — 99239 HOSP IP/OBS DSCHRG MGMT >30: CPT | Performed by: FAMILY MEDICINE

## 2020-07-06 PROCEDURE — 84443 ASSAY THYROID STIM HORMONE: CPT | Performed by: FAMILY MEDICINE

## 2020-07-06 PROCEDURE — 84702 CHORIONIC GONADOTROPIN TEST: CPT | Performed by: FAMILY MEDICINE

## 2020-07-06 PROCEDURE — 80053 COMPREHEN METABOLIC PANEL: CPT | Performed by: FAMILY MEDICINE

## 2020-07-06 PROCEDURE — 85027 COMPLETE CBC AUTOMATED: CPT | Performed by: FAMILY MEDICINE

## 2020-07-06 PROCEDURE — 81003 URINALYSIS AUTO W/O SCOPE: CPT | Performed by: EMERGENCY MEDICINE

## 2020-07-06 PROCEDURE — 80307 DRUG TEST PRSMV CHEM ANLYZR: CPT | Performed by: EMERGENCY MEDICINE

## 2020-07-06 PROCEDURE — 94762 N-INVAS EAR/PLS OXIMTRY CONT: CPT

## 2020-07-06 RX ORDER — CALCIUM GLUCONATE 20 MG/ML
1 INJECTION, SOLUTION INTRAVENOUS ONCE
Status: COMPLETED | OUTPATIENT
Start: 2020-07-06 | End: 2020-07-06

## 2020-07-06 RX ORDER — CALCIUM CHLORIDE 100 MG/ML
1 INJECTION INTRAVENOUS; INTRAVENTRICULAR ONCE
Status: DISCONTINUED | OUTPATIENT
Start: 2020-07-06 | End: 2020-07-06

## 2020-07-06 RX ORDER — CHLORDIAZEPOXIDE HYDROCHLORIDE 5 MG/1
5 CAPSULE, GELATIN COATED ORAL 2 TIMES DAILY
Qty: 8 CAPSULE | Refills: 0 | Status: SHIPPED | OUTPATIENT
Start: 2020-07-06 | End: 2020-12-14 | Stop reason: SDUPTHER

## 2020-07-06 RX ORDER — FOLIC ACID 1 MG/1
1 TABLET ORAL DAILY
Qty: 30 TABLET | Refills: 0 | Status: SHIPPED | OUTPATIENT
Start: 2020-07-07

## 2020-07-06 RX ORDER — POTASSIUM CHLORIDE 20 MEQ/1
40 TABLET, EXTENDED RELEASE ORAL ONCE
Status: COMPLETED | OUTPATIENT
Start: 2020-07-06 | End: 2020-07-06

## 2020-07-06 RX ORDER — LISINOPRIL 5 MG/1
5 TABLET ORAL DAILY
Status: DISCONTINUED | OUTPATIENT
Start: 2020-07-06 | End: 2020-07-06 | Stop reason: HOSPADM

## 2020-07-06 RX ADMIN — FOLIC ACID 1 MG: 1 TABLET ORAL at 09:49

## 2020-07-06 RX ADMIN — SODIUM CHLORIDE 125 ML/HR: 0.9 INJECTION, SOLUTION INTRAVENOUS at 02:59

## 2020-07-06 RX ADMIN — THIAMINE HCL TAB 100 MG 100 MG: 100 TAB at 09:49

## 2020-07-06 RX ADMIN — POTASSIUM CHLORIDE 40 MEQ: 1500 TABLET, EXTENDED RELEASE ORAL at 09:49

## 2020-07-06 RX ADMIN — ZINC 1 TABLET: TAB ORAL at 09:49

## 2020-07-06 RX ADMIN — CHLORDIAZEPOXIDE HYDROCHLORIDE 5 MG: 5 CAPSULE ORAL at 05:14

## 2020-07-06 RX ADMIN — CALCIUM GLUCONATE 1 G: 20 INJECTION, SOLUTION INTRAVENOUS at 09:49

## 2020-07-06 NOTE — ASSESSMENT & PLAN NOTE
Probably secondary to alcohol withdrawal   No source of infection isolated at this time  Will repeat a chest x-ray for further evaluation

## 2020-07-06 NOTE — UTILIZATION REVIEW
Notification of Inpatient Admission/Inpatient Authorization Request   This is a Notification of Inpatient Admission for [address]  Be advised that this patient was admitted to our facility under Inpatient Status  Contact Boone Canavan at [phone] for additional admission information  [facility] DEPT  DEDICATED [phone]  Patient Name:   Audrene Bence   YOB: 1980       [address]   [address]:   [address]  Tax ID: [id number]  NPI: [id number] Attending Provider/NPI:  Phone:  Address: Thomas Mcwilliams [facility] [[id number]]  [phone]  [facility] AS FACILITY   Place of Service Code: 24 Place of Service Name:  [address] [place] [address]   Start Date: 7/5/20 1736     Discharge Date & Time: 7/6/2020 11:37 AM    Type of Admission: Inpatient Status Discharge Disposition (if discharged): Home/Self Care   Patient Diagnoses: Lactic acidosis [E87 2]  Anxiety attack [F41 0]  [facility] ([facility] ) [P06 621]     Orders: Admission Orders (From admission, onward)     Ordered        07/05/20 1736  Inpatient Admission (expected length of stay for this patient Order details is greater than two midnights)  Once                    Assigned Utilization Review Contact: Boone Canavan  Utilization   Network Utilization Review Department  Phone: [phone]; Fax [phone]  Email: [email or web address]   ATTENTION PAYERS: Please call the assigned Utilization  directly with any questions or concerns ALL voicemails in the department are confidential  Send all requests for admission clinical reviews, approved or denied determinations and any other requests to dedicated fax number belonging to the campus where the patient is receiving treatment

## 2020-07-06 NOTE — ASSESSMENT & PLAN NOTE
Patient has known history of essential hypertension and takes lisinopril 5 mg daily    Resume lisinopril today

## 2020-07-06 NOTE — UTILIZATION REVIEW
Initial Clinical Review    Admission: Date/Time/Statement: Admission Orders (From admission, onward)     Ordered        07/05/20 1736  Inpatient Admission (expected length of stay for this patient Order details is greater than two midnights)  Once                   Orders Placed This Encounter   Procedures    Inpatient Admission (expected length of stay for this patient Order details is greater than two midnights)     Standing Status:   Standing     Number of Occurrences:   1     Order Specific Question:   Admitting Physician     Answer:   Shawna Emms [[id number]]     Order Specific Question:   Level of Care     Answer:   Med Surg [16]     Order Specific Question:   Estimated length of stay     Answer:   More than 2 Midnights     Order Specific Question:   Certification     Answer:   I certify that inpatient services are medically necessary for this patient for a duration of greater than two midnights  See H&P and MD Progress Notes for additional information about the patient's course of treatment  ED Arrival Information     Expected Arrival Acuity Means of Arrival Escorted By Service Admission Type    - 7/5/2020 16:24 Urgent Ambulance [address] [address] Hospitalist Urgent    Arrival Complaint    anxiety        Chief Complaint   Patient presents with    Withdrawal - Alcohol     pt has tired to quit  pt is non compliant with her treatment     Assessment/Plan: 36 yo female who drinks a pint of vodka daily w/hx etoh withdrawal seizures and multiple prior admissions to ED from home by EMS admitted as inpatient due to lactic acidosis and alcohol withdrawal  Presented to ED due to intractable n/v with very poor PO intake over prior few days  Last drink the evening pta  Felt very rigid and diffusely diaphoretic, felt like having muscle spasms  Exam reveals somnolent, lethargic, beads of sweat throuhgout face & dripping  CIWA 13  Hyperventilating and retching  LFT's elevated   ED gave 2 liters IVF, IV ativan, IV benadryl, IV zofran, and IV reglan  IVF, benzos, serial labs, liquid diet, and CIWA monitoring in progress  Avoiding hepatotoxics  7/6: declines etoh rehab  IVF continues, n/v resolved, advancing to regular diet  Ambulating in hallway  Febrile, likely from etoh withdrawal  No infectious source found  CXR negative  Mildly hypokalemic and hypocalcemic, which was repleted       ED Triage Vitals   Temperature Pulse Respirations Blood Pressure SpO2   07/05/20 1652 07/05/20 1652 07/05/20 1652 07/05/20 1652 07/05/20 1652   98 2 °F (36 8 °C) 86 (!) 24 145/89 99 %      Temp Source Heart Rate Source Patient Position - Orthostatic VS BP Location FiO2 (%)   07/05/20 1854 -- 07/05/20 1854 07/05/20 1854 --   Oral  Lying Right arm       Pain Score       07/05/20 1652       (S) Worst Possible Pain        Wt Readings from Last 1 Encounters:   07/05/20 58 2 kg (128 lb 4 9 oz)     Additional Vital Signs:   Date/Time  Temp  Pulse  Resp  BP  MAP (mmHg)  SpO2  O2 Device  Patient Position - Orthostatic VS   07/06/20 0815            98 %  None (Room air)     07/06/20 07:30:08  99 5 °F (37 5 °C)  76  16  140/98  112  98 %       07/06/20 04:22:50    72  16  136/96  109  97 %       07/06/20 0300    68               07/06/20 0200    79               07/06/20 0100    85               07/06/20 0000    75               07/05/20 23:19:42  100 5 °F (38 1 °C)  90  16  125/90  102  99 %  None (Room air)     07/05/20 2300    94               07/05/20 2100  99 °F (37 2 °C)  90  18  118/70        Sitting   07/05/20 1957              None (Room air)     07/05/20 1912            98 %  None (Room air)     07/05/20 18:54:58  100 2 °F (37 9 °C)  92  18  126/90  102  98 %  None (Room air)  Lying     CIWA: (13 in ED)  7/5 @1854   1(agitation)  7/5 @2100   1 (agitation)  7/6 @0500   0  Pertinent Labs/Diagnostic Test Results:      7/5 EK: nsr  7/5 PCXR: nothing acute  Results from last 7 days Lab Units 07/06/20  0419 07/05/20  1646   WBC Thousand/uL 4 41 5 93   HEMOGLOBIN g/dL 11 4* 14 7   HEMATOCRIT % 32 4* 41 2   PLATELETS Thousands/uL 103* 211         Results from last 7 days   Lab Units 07/06/20  0419 07/05/20  1646   SODIUM mmol/L 137 136   POTASSIUM mmol/L 3 2* 4 0   CHLORIDE mmol/L 103 94*   CO2 mmol/L 28 21   ANION GAP mmol/L 6 21*   BUN mg/dL 4* 6   CREATININE mg/dL 0 73 1 12   EGFR ml/min/1 73sq m 103 62   CALCIUM mg/dL 6 1* 8 0*     Results from last 7 days   Lab Units 07/06/20  0419 07/05/20  1646   AST U/L 117* 195*   ALT U/L 49 74   ALK PHOS U/L 79 105   TOTAL PROTEIN g/dL 5 8* 8 2   ALBUMIN g/dL 2 8* 3 9   TOTAL BILIRUBIN mg/dL 1 00 1 56*         Results from last 7 days   Lab Units 07/06/20  0419 07/05/20  1646   GLUCOSE RANDOM mg/dL 108 126     Results from last 7 days   Lab Units 07/05/20  1646   CK TOTAL U/L 182   CK MB INDEX % <1 0   CK MB ng/mL 0 6     Results from last 7 days   Lab Units 07/06/20  0419   TSH 3RD GENERATON uIU/mL 0 985     Results from last 7 days   Lab Units 07/05/20 2010 07/05/20  1646   LACTIC ACID mmol/L 1 9 7 4*     Results from last 7 days   Lab Units 07/05/20  1646   LIPASE u/L 206             Results from last 7 days   Lab Units 07/06/20  0900   CLARITY UA  Clear   COLOR UA  Yellow   SPEC GRAV UA  1 015   PH UA  7 5   GLUCOSE UA mg/dl 250 (1/4%)*   KETONES UA mg/dl Negative   BLOOD UA  Negative   PROTEIN UA mg/dl Negative   NITRITE UA  Negative   BILIRUBIN UA  Negative   UROBILINOGEN UA E U /dl 1 0   LEUKOCYTES UA  Negative     Results from last 7 days   Lab Units 07/06/20  0859   AMPH/METH  Negative   BARBITURATE UR  Negative   BENZODIAZEPINE UR  Negative   COCAINE UR  Negative   METHADONE URINE  Negative   OPIATE UR  Negative   PCP UR  Negative   THC UR  Positive*     Results from last 7 days   Lab Units 07/05/20  1741   ETHANOL LVL mg/dL <3   ED Treatment:   Medication Administration from 07/05/2020 1624 to 07/05/2020 6919       Date/Time Order Dose Route Action     07/05/2020 1641 sodium chloride 0 9 % bolus 2,000 mL 2,000 mL Intravenous New Bag     07/05/2020 1634 LORazepam (ATIVAN) injection 2 mg 2 mg Intravenous Given     07/05/2020 1634 ondansetron (ZOFRAN) injection 4 mg 4 mg Intravenous Given     07/05/2020 1641 metoclopramide (REGLAN) injection 10 mg 10 mg Intravenous Given     07/05/2020 1636 diphenhydrAMINE (BENADRYL) injection 25 mg 25 mg Intravenous Given        Past Medical History:   Diagnosis Date    Alcohol abuse     Asthma     Hypertension     Tubal ligation status          Admitting Diagnosis: Lactic acidosis [E87 2]  Anxiety attack [F41 0]  Alcohol withdrawal syndrome without complication (HCC) [Z56 710]  Age/Sex: 35 y o  female  Admission Orders:  Scheduled Medications:  Medications:  chlordiazePOXIDE 5 mg Oral K7F [address]   folic acid 1 mg Oral Daily   lisinopril 5 mg Oral Daily   multivitamin stress formula 1 tablet Oral Daily   thiamine 100 mg Oral Daily   IV ca gluc x 1 7/6 @0949  PO kdur x 1 7/6 @0949    Continuous IV Infusions:  sodium chloride 125 mL/hr Intravenous Continuous     PRN Meds:  ondansetron 4 mg Intravenous Q6H PRN     CIWA monitoring  Tele  SCD's  Full liquids, advance to regular        Network Utilization Review Department  [email or web address]  ATTENTION: Please call with any questions or concerns to [phone] and carefully listen to the prompts so that you are directed to the right person  All voicemails are confidential   Heike Sexton all requests for admission clinical reviews, approved or denied determinations and any other requests to dedicated fax number below belonging to the campus where the patient is receiving treatment   List of dedicated fax numbers for the Facilities:  [address] DENIALS (Administrative/Medical Necessity) [phone]   [address] [place] [address] (Maternity/NICU/Pediatrics) [phone]   Thomas Aguirre [address] [place] [address] [place] [phone]   Maliha Pitch [phone]   [facility] [address] [facility] [phone]   [facility]  [phone]   [place] [address] [phone]   [address] [place] [address] [phone]

## 2020-07-06 NOTE — ASSESSMENT & PLAN NOTE
Patient presented with intractable nausea and vomiting since yesterday  Continue IV fluid hydration for now  Nausea vomiting has resolved    Advance diet to regular diet today

## 2020-07-06 NOTE — PLAN OF CARE
Problem: Prexisting or High Potential for Compromised Skin Integrity  Goal: Skin integrity is maintained or improved  Description  INTERVENTIONS:  - Identify patients at risk for skin breakdown  - Assess and monitor skin integrity  - Assess and monitor nutrition and hydration status  - Monitor labs   - Assess for incontinence   - Turn and reposition patient  - Assist with mobility/ambulation  - Relieve pressure over bony prominences  - Avoid friction and shearing  - Provide appropriate hygiene as needed including keeping skin clean and dry  - Evaluate need for skin moisturizer/barrier cream  - Collaborate with interdisciplinary team   - Patient/family teaching  - Consider wound care consult   Outcome: Progressing     Problem: Potential for Falls  Goal: Patient will remain free of falls  Description  INTERVENTIONS:  - Assess patient frequently for physical needs  -  Identify cognitive and physical deficits and behaviors that affect risk of falls  -  Register fall precautions as indicated by assessment   - Educate patient/family on patient safety including physical limitations  - Instruct patient to call for assistance with activity based on assessment  - Modify environment to reduce risk of injury  - Consider OT/PT consult to assist with strengthening/mobility  Outcome: Progressing     Problem: Nutrition/Hydration-ADULT  Goal: Nutrient/Hydration intake appropriate for improving, restoring or maintaining nutritional needs  Description  Monitor and assess patient's nutrition/hydration status for malnutrition  Collaborate with interdisciplinary team and initiate plan and interventions as ordered  Monitor patient's weight and dietary intake as ordered or per policy  Utilize nutrition screening tool and intervene as necessary  Determine patient's food preferences and provide high-protein, high-caloric foods as appropriate       INTERVENTIONS:  - Monitor oral intake, urinary output, labs, and treatment plans  - Assess nutrition and hydration status and recommend course of action  - Evaluate amount of meals eaten  - Assist patient with eating if necessary   - Allow adequate time for meals  - Recommend/ encourage appropriate diets, oral nutritional supplements, and vitamin/mineral supplements  - Order, calculate, and assess calorie counts as needed  - Recommend, monitor, and adjust tube feedings and TPN/PPN based on assessed needs  - Assess need for intravenous fluids  - Provide specific nutrition/hydration education as appropriate  - Include patient/family/caregiver in decisions related to nutrition  Outcome: Progressing     Problem: PAIN - ADULT  Goal: Verbalizes/displays adequate comfort level or baseline comfort level  Description  Interventions:  - Encourage patient to monitor pain and request assistance  - Assess pain using appropriate pain scale  - Administer analgesics based on type and severity of pain and evaluate response  - Implement non-pharmacological measures as appropriate and evaluate response  - Consider cultural and social influences on pain and pain management  - Notify physician/advanced practitioner if interventions unsuccessful or patient reports new pain  Outcome: Progressing     Problem: INFECTION - ADULT  Goal: Absence or prevention of progression during hospitalization  Description  INTERVENTIONS:  - Assess and monitor for signs and symptoms of infection  - Monitor lab/diagnostic results  - Monitor all insertion sites, i e  indwelling lines, tubes, and drains  - Monitor endotracheal if appropriate and nasal secretions for changes in amount and color  - [place] appropriate cooling/warming therapies per order  - Administer medications as ordered  - Instruct and encourage patient and family to use good hand hygiene technique  - Identify and instruct in appropriate isolation precautions for identified infection/condition  Outcome: Progressing  Goal: Absence of fever/infection during neutropenic period  Description  INTERVENTIONS:  - Monitor WBC    Outcome: Progressing     Problem: SAFETY ADULT  Goal: Maintain or return to baseline ADL function  Description  INTERVENTIONS:  -  Assess patient's ability to carry out ADLs; assess patient's baseline for ADL function and identify physical deficits which impact ability to perform ADLs (bathing, care of mouth/teeth, toileting, grooming, dressing, etc )  - Assess/evaluate cause of self-care deficits   - Assess range of motion  - Assess patient's mobility; develop plan if impaired  - Assess patient's need for assistive devices and provide as appropriate  - Encourage maximum independence but intervene and supervise when necessary  - Involve family in performance of ADLs  - Assess for home care needs following discharge   - Consider OT consult to assist with ADL evaluation and planning for discharge  - Provide patient education as appropriate  Outcome: Progressing  Goal: Maintain or return mobility status to optimal level  Description  INTERVENTIONS:  - Assess patient's baseline mobility status (ambulation, transfers, stairs, etc )    - Identify cognitive and physical deficits and behaviors that affect mobility  - Identify mobility aids required to assist with transfers and/or ambulation (gait belt, sit-to-stand, lift, walker, cane, etc )  - [place] fall precautions as indicated by assessment  - Record patient progress and toleration of activity level on Mobility SBAR; progress patient to next Phase/Stage  - Instruct patient to call for assistance with activity based on assessment  - Consider rehabilitation consult to assist with strengthening/weightbearing, etc   Outcome: Progressing     Problem: DISCHARGE PLANNING  Goal: Discharge to home or other facility with appropriate resources  Description  INTERVENTIONS:  - Identify barriers to discharge w/patient and caregiver  - Arrange for needed discharge resources and transportation as appropriate  - Identify discharge learning needs (meds, wound care, etc )  - Arrange for interpretive services to assist at discharge as needed  - Refer to Case Management Department for coordinating discharge planning if the patient needs post-hospital services based on physician/advanced practitioner order or complex needs related to functional status, cognitive ability, or social support system  Outcome: Progressing     Problem: Knowledge Deficit  Goal: Patient/family/caregiver demonstrates understanding of disease process, treatment plan, medications, and discharge instructions  Description  Complete learning assessment and assess knowledge base    Interventions:  - Provide teaching at level of understanding  - Provide teaching via preferred learning methods  Outcome: Progressing

## 2020-07-06 NOTE — ASSESSMENT & PLAN NOTE
Patient presented with acute lactic acidosis secondary to intractable nausea and vomiting , dehydration due to poor oral intake and alcohol withdrawal   Lactic acid was 7 4 on presentation  There is no signs of any infection at this time  Received fluid bolus of 2 L     Repeat lactic acid is normal   Continue IV fluid hydration for now

## 2020-07-06 NOTE — ASSESSMENT & PLAN NOTE
Patient has known history of excessive alcohol abuse  She states she drinks a pint of vodka daily  She had her last drink the day prior to admission  She has not had much to eat or drink since then because she has been nauseous  She was brought to the emergency room after her significant other called the ambulance due to her being very stiff and rigid and weak  Placed on CIWA protocol  Patient has a known history of seizures due to alcohol withdrawal in the past  Currently she denies any hallucinations or seizures  Place on Librium 5 mg tapering course  Offered was referral for alcohol rehab program but she does not seem interested at this time    Clinically improving  Continue CIWA protocol    Possible discharge home later today burning and numbness sensation/EDEMA/PAIN

## 2020-07-06 NOTE — PLAN OF CARE
Problem: Prexisting or High Potential for Compromised Skin Integrity  Goal: Skin integrity is maintained or improved  Description  INTERVENTIONS:  - Identify patients at risk for skin breakdown  - Assess and monitor skin integrity  - Assess and monitor nutrition and hydration status  - Monitor labs   - Assess for incontinence   - Turn and reposition patient  - Assist with mobility/ambulation  - Relieve pressure over bony prominences  - Avoid friction and shearing  - Provide appropriate hygiene as needed including keeping skin clean and dry  - Evaluate need for skin moisturizer/barrier cream  - Collaborate with interdisciplinary team   - Patient/family teaching  - Consider wound care consult   Outcome: Progressing     Problem: Potential for Falls  Goal: Patient will remain free of falls  Description  INTERVENTIONS:  - Assess patient frequently for physical needs  -  Identify cognitive and physical deficits and behaviors that affect risk of falls  -  [place] fall precautions as indicated by assessment   - Educate patient/family on patient safety including physical limitations  - Instruct patient to call for assistance with activity based on assessment  - Modify environment to reduce risk of injury  - Consider OT/PT consult to assist with strengthening/mobility  Outcome: Progressing     Problem: Nutrition/Hydration-ADULT  Goal: Nutrient/Hydration intake appropriate for improving, restoring or maintaining nutritional needs  Description  Monitor and assess patient's nutrition/hydration status for malnutrition  Collaborate with interdisciplinary team and initiate plan and interventions as ordered  Monitor patient's weight and dietary intake as ordered or per policy  Utilize nutrition screening tool and intervene as necessary  Determine patient's food preferences and provide high-protein, high-caloric foods as appropriate       INTERVENTIONS:  - Monitor oral intake, urinary output, labs, and treatment plans  - Assess nutrition and hydration status and recommend course of action  - Evaluate amount of meals eaten  - Assist patient with eating if necessary   - Allow adequate time for meals  - Recommend/ encourage appropriate diets, oral nutritional supplements, and vitamin/mineral supplements  - Order, calculate, and assess calorie counts as needed  - Recommend, monitor, and adjust tube feedings and TPN/PPN based on assessed needs  - Assess need for intravenous fluids  - Provide specific nutrition/hydration education as appropriate  - Include patient/family/caregiver in decisions related to nutrition  Outcome: Progressing     Problem: PAIN - ADULT  Goal: Verbalizes/displays adequate comfort level or baseline comfort level  Description  Interventions:  - Encourage patient to monitor pain and request assistance  - Assess pain using appropriate pain scale  - Administer analgesics based on type and severity of pain and evaluate response  - Implement non-pharmacological measures as appropriate and evaluate response  - Consider cultural and social influences on pain and pain management  - Notify physician/advanced practitioner if interventions unsuccessful or patient reports new pain  Outcome: Progressing     Problem: INFECTION - ADULT  Goal: Absence or prevention of progression during hospitalization  Description  INTERVENTIONS:  - Assess and monitor for signs and symptoms of infection  - Monitor lab/diagnostic results  - Monitor all insertion sites, i e  indwelling lines, tubes, and drains  - Monitor endotracheal if appropriate and nasal secretions for changes in amount and color  - [place] appropriate cooling/warming therapies per order  - Administer medications as ordered  - Instruct and encourage patient and family to use good hand hygiene technique  - Identify and instruct in appropriate isolation precautions for identified infection/condition  Outcome: Progressing     Problem: SAFETY ADULT  Goal: Maintain or return to baseline ADL function  Description  INTERVENTIONS:  -  Assess patient's ability to carry out ADLs; assess patient's baseline for ADL function and identify physical deficits which impact ability to perform ADLs (bathing, care of mouth/teeth, toileting, grooming, dressing, etc )  - Assess/evaluate cause of self-care deficits   - Assess range of motion  - Assess patient's mobility; develop plan if impaired  - Assess patient's need for assistive devices and provide as appropriate  - Encourage maximum independence but intervene and supervise when necessary  - Involve family in performance of ADLs  - Assess for home care needs following discharge   - Consider OT consult to assist with ADL evaluation and planning for discharge  - Provide patient education as appropriate  Outcome: Progressing  Goal: Maintain or return mobility status to optimal level  Description  INTERVENTIONS:  - Assess patient's baseline mobility status (ambulation, transfers, stairs, etc )    - Identify cognitive and physical deficits and behaviors that affect mobility  - Identify mobility aids required to assist with transfers and/or ambulation (gait belt, sit-to-stand, lift, walker, cane, etc )  - [place] fall precautions as indicated by assessment  - Record patient progress and toleration of activity level on Mobility SBAR; progress patient to next Phase/Stage  - Instruct patient to call for assistance with activity based on assessment  - Consider rehabilitation consult to assist with strengthening/weightbearing, etc   Outcome: Progressing     Problem: DISCHARGE PLANNING  Goal: Discharge to home or other facility with appropriate resources  Description  INTERVENTIONS:  - Identify barriers to discharge w/patient and caregiver  - Arrange for needed discharge resources and transportation as appropriate  - Identify discharge learning needs (meds, wound care, etc )  - Arrange for interpretive services to assist at discharge as needed  - Refer to Case Management Department for coordinating discharge planning if the patient needs post-hospital services based on physician/advanced practitioner order or complex needs related to functional status, cognitive ability, or social support system  Outcome: Progressing     Problem: Knowledge Deficit  Goal: Patient/family/caregiver demonstrates understanding of disease process, treatment plan, medications, and discharge instructions  Description  Complete learning assessment and assess knowledge base    Interventions:  - Provide teaching at level of understanding  - Provide teaching via preferred learning methods  Outcome: Progressing

## 2020-07-06 NOTE — DISCHARGE SUMMARY
Discharge- Maricruz Zarate 1980, 35 y o  female MRN: [id number]    Unit/Bed#: -01 Encounter: [id number]    Primary Care Provider: Sushila Rey MD   Date and time admitted to hospital: 7/5/2020  4:26 PM        Alcohol withdrawal syndrome with complication [facility])  Assessment & Plan  Patient has known history of excessive alcohol abuse  She states she drinks a pint of vodka daily  She had her last drink the day prior to admission  She has not had much to eat or drink since then because she has been nauseous  She was brought to the emergency room after her significant other called the ambulance due to her being very stiff and rigid and weak  Placed on CIWA protocol  Patient has a known history of seizures due to alcohol withdrawal in the past  Currently she denies any hallucinations or seizures  Place on Librium 5 mg tapering course  Offered was referral for alcohol rehab program but she does not seem interested at this time    Clinically improving  Continue CIWA protocol  Possible discharge home later today    * Lactic acidosis  Assessment & Plan  Patient presented with acute lactic acidosis secondary to intractable nausea and vomiting , dehydration due to poor oral intake and alcohol withdrawal   Lactic acid was 7 4 on presentation  There is no signs of any infection at this time  Received fluid bolus of 2 L   Repeat lactic acid is normal   Continue IV fluid hydration for now    Intractable nausea and vomiting  Assessment & Plan  Patient presented with intractable nausea and vomiting since yesterday  Continue IV fluid hydration for now  Nausea vomiting has resolved  Advance diet to regular diet today    Fever  Assessment & Plan  Probably secondary to alcohol withdrawal   No source of infection isolated at this time  Will repeat a chest x-ray for further evaluation  Transaminitis  Assessment & Plan  Secondary to alcohol abuse  Avoid hepatotoxic agents and hypotension    Transaminitis is improving    Tobacco abuse  Assessment & Plan  Counseled on smoking cessation  Patient refused nicotine replacement therapy    Essential hypertension  Assessment & Plan  Patient has known history of essential hypertension and takes lisinopril 5 mg daily  Resume lisinopril today      Discharging Physician / Practitioner: Shayna Darden MD  PCP: Beverly Adams MD  Admission Date:   Admission Orders (From admission, onward)     Ordered        07/05/20 1736  Inpatient Admission (expected length of stay for this patient Order details is greater than two midnights)  Once                   Discharge Date: 07/06/20    Resolved Problems  Date Reviewed: 7/6/2020    None          Consultations During Hospital Stay:  · None    Procedures Performed:   · None    Significant Findings / Test Results:   Xr Chest 1 View Portable    Result Date: 7/6/2020  Impression: No acute cardiopulmonary disease  Workstation performed: [id number]     Incidental Findings:   · None     Test Results Pending at Discharge (will require follow up): · None     Outpatient Tests Requested:  · None    Complications:  None    Reason for Admission:  Stiffness and rigidity    Hospital Course:     Mauro Dawkins is a 35 y o  female patient who originally presented to the hospital on 7/5/2020 due to stiffness and rigidity  Patient isn't known abuser of alcohol  She drinks a pt of vodka per day  Yesterday evening she was found to be having generalized weakness and fatigue and also feeling muscle cramps   No seizure reported  She had not been eating and drinking well and reporting excessive alcohol consumption  She was found have a lactic acidosis and dehydration and placed on IV fluid hydration following which she started to improved  Also placed on CIWA protocol for alcohol withdrawal   She is feeling better today  Will be discharging her home  Offered alcohol rehab services several times which she refused every time    Will discharge home on a Librium taper  Patient noted to have mild hypokalemia and hypocalcemia which was replaced today prior to discharge    The patient, initially admitted to the hospital as inpatient, was discharged earlier than expected given the following: Rapid improvement from lactic acidosis and alcohol withdrawal     Please see above list of diagnoses and related plan for additional information  Condition at Discharge: good     Discharge Day Visit / Exam:     Subjective:  Patient denies any chest pain or shortness of breath or abdominal pain at this time  She states she is feeling much better  She ate a regular meal with no nausea vomiting  No body aches or rigidity in reported  Vitals: Blood Pressure: 140/98 (07/06/20 0730)  Pulse: 76 (07/06/20 0730)  Temperature: 99 5 °F (37 5 °C) (07/06/20 0730)  Temp Source: Oral (07/05/20 1854)  Respirations: 16 (07/06/20 0730)  Height: 4' 11" (149 9 cm) (07/05/20 1854)  Weight - Scale: 58 2 kg (128 lb 4 9 oz) (07/05/20 1854)  SpO2: 98 % (07/06/20 0815)  Exam:   Physical Exam   Constitutional: She is oriented to person, place, and time  She appears well-developed and well-nourished  HENT:   Head: Normocephalic and atraumatic  Right Ear: External ear normal    Left Ear: External ear normal    Mouth/Throat: Oropharynx is clear and moist    Eyes: Conjunctivae and EOM are normal    Neck: Normal range of motion  Neck supple  Cardiovascular: Normal rate, regular rhythm and normal heart sounds  Pulmonary/Chest: Effort normal and breath sounds normal    Abdominal: Soft  Bowel sounds are normal  She exhibits no mass  There is no tenderness  There is no rebound and no guarding  Genitourinary:   Genitourinary Comments: deferred   Musculoskeletal: Normal range of motion  Neurological: She is alert and oriented to person, place, and time  She has normal reflexes  Cranial nerves 2-12 are normal   Normal neurological exam   Skin: Skin is warm and dry  No rash noted     Psychiatric: She has a normal mood and affect  Nursing note and vitals reviewed  Discussion with Family:  Discussed with significant other at bedside    Discharge instructions/Information to patient and family:   See after visit summary for information provided to patient and family  Provisions for Follow-Up Care:  See after visit summary for information related to follow-up care and any pertinent home health orders  Disposition:     Home    For Discharges to [facility] SNF:   · Not Applicable to this Patient -     Planned Readmission:  None     Discharge Statement:  I spent 35 minutes discharging the patient  This time was spent on the day of discharge  I had direct contact with the patient on the day of discharge  Greater than 50% of the total time was spent examining patient, answering all patient questions, arranging and discussing plan of care with patient as well as directly providing post-discharge instructions  Additional time then spent on discharge activities  Discharge Medications:  See after visit summary for reconciled discharge medications provided to patient and family        ** Please Note: This note has been constructed using a voice recognition system **

## 2020-07-07 NOTE — UTILIZATION REVIEW
Notification of Discharge  This is a Notification of Discharge from our facility [address]  Please be advised that this patient has been discharge from our facility  Below you will find the admission and discharge date and time including the patients disposition  PRESENTATION DATE: 7/5/2020  4:26 PM  OBS ADMISSION DATE:   IP ADMISSION DATE: 7/5/20 1736   DISCHARGE DATE: 7/6/2020 11:37 AM  DISPOSITION: Home/Self Care Home/Self Care   Admission Orders listed below:  Admission Orders (From admission, onward)     Ordered        07/05/20 1736  Inpatient Admission (expected length of stay for this patient Order details is greater than two midnights)  Once                   Please contact the UR Department if additional information is required to close this patient's authorization/case  [address] Utilization Review Department  Main: [phone] x carefully listen to the prompts  All voicemails are confidential   [email or web address]  Send all requests for admission clinical reviews, approved or denied determinations and any other requests to dedicated fax number below belonging to the campus where the patient is receiving treatment   List of dedicated fax numbers:  [address] DENIALS (Administrative/Medical Necessity) [phone]   [address] [place] [address] (Maternity/NICU/Pediatrics) [phone]   Jagdish Min [phone]   Josseline Sheets [phone]   Audrey Chao [phone]   [facility] [address] [facility] [phone]   [facility]  [phone]   [address] [facility], [facility]  [address] [facility] [address] [facility] [phone]

## 2020-12-14 ENCOUNTER — APPOINTMENT (EMERGENCY)
Dept: RADIOLOGY | Facility: HOSPITAL | Age: 40
End: 2020-12-14
Payer: COMMERCIAL

## 2020-12-14 ENCOUNTER — HOSPITAL ENCOUNTER (EMERGENCY)
Facility: HOSPITAL | Age: 40
Discharge: HOME | End: 2020-12-14
Attending: EMERGENCY MEDICINE
Payer: COMMERCIAL

## 2020-12-14 VITALS
HEART RATE: 91 BPM | DIASTOLIC BLOOD PRESSURE: 82 MMHG | BODY MASS INDEX: 24.36 KG/M2 | OXYGEN SATURATION: 96 % | WEIGHT: 120.59 LBS | RESPIRATION RATE: 18 BRPM | SYSTOLIC BLOOD PRESSURE: 127 MMHG | TEMPERATURE: 97.4 F

## 2020-12-14 DIAGNOSIS — F10.230 ALCOHOL WITHDRAWAL SYNDROME WITHOUT COMPLICATION (HCC): ICD-10-CM

## 2020-12-14 DIAGNOSIS — F10.239 ALCOHOL WITHDRAWAL SEIZURE (HCC): ICD-10-CM

## 2020-12-14 DIAGNOSIS — R56.9 SEIZURE (HCC): Primary | ICD-10-CM

## 2020-12-14 DIAGNOSIS — F10.20 ALCOHOL USE DISORDER, SEVERE, DEPENDENCE (HCC): ICD-10-CM

## 2020-12-14 DIAGNOSIS — R56.9 ALCOHOL WITHDRAWAL SEIZURE (HCC): ICD-10-CM

## 2020-12-14 LAB
ALBUMIN SERPL BCP-MCNC: 3.7 G/DL (ref 3.5–5)
ALP SERPL-CCNC: 90 U/L (ref 46–116)
ALT SERPL W P-5'-P-CCNC: 29 U/L (ref 12–78)
AMPHETAMINES SERPL QL SCN: NEGATIVE
ANION GAP SERPL CALCULATED.3IONS-SCNC: 10 MMOL/L (ref 4–13)
ANION GAP SERPL CALCULATED.3IONS-SCNC: 23 MMOL/L (ref 4–13)
AST SERPL W P-5'-P-CCNC: 58 U/L (ref 5–45)
BARBITURATES UR QL: NEGATIVE
BASOPHILS # BLD AUTO: 0.03 THOUSANDS/ΜL (ref 0–0.1)
BASOPHILS NFR BLD AUTO: 1 % (ref 0–1)
BENZODIAZ UR QL: NEGATIVE
BILIRUB SERPL-MCNC: 1.03 MG/DL (ref 0.2–1)
BUN SERPL-MCNC: 6 MG/DL (ref 5–25)
BUN SERPL-MCNC: 7 MG/DL (ref 5–25)
CALCIUM SERPL-MCNC: 7.7 MG/DL (ref 8.3–10.1)
CALCIUM SERPL-MCNC: 8.7 MG/DL (ref 8.3–10.1)
CHLORIDE SERPL-SCNC: 91 MMOL/L (ref 100–108)
CHLORIDE SERPL-SCNC: 99 MMOL/L (ref 100–108)
CO2 SERPL-SCNC: 15 MMOL/L (ref 21–32)
CO2 SERPL-SCNC: 23 MMOL/L (ref 21–32)
COCAINE UR QL: NEGATIVE
CREAT SERPL-MCNC: 0.71 MG/DL (ref 0.6–1.3)
CREAT SERPL-MCNC: 1.22 MG/DL (ref 0.6–1.3)
EOSINOPHIL # BLD AUTO: 0.01 THOUSAND/ΜL (ref 0–0.61)
EOSINOPHIL NFR BLD AUTO: 0 % (ref 0–6)
ERYTHROCYTE [DISTWIDTH] IN BLOOD BY AUTOMATED COUNT: 12.9 % (ref 11.6–15.1)
ETHANOL SERPL-MCNC: <3 MG/DL (ref 0–3)
EXT PREG TEST URINE: NEGATIVE
EXT. CONTROL ED NAV: NORMAL
GFR SERPL CREATININE-BSD FRML MDRD: 106 ML/MIN/1.73SQ M
GFR SERPL CREATININE-BSD FRML MDRD: 55 ML/MIN/1.73SQ M
GLUCOSE SERPL-MCNC: 145 MG/DL (ref 65–140)
GLUCOSE SERPL-MCNC: 88 MG/DL (ref 65–140)
HCT VFR BLD AUTO: 35.2 % (ref 34.8–46.1)
HGB BLD-MCNC: 12.1 G/DL (ref 11.5–15.4)
IMM GRANULOCYTES # BLD AUTO: 0.02 THOUSAND/UL (ref 0–0.2)
IMM GRANULOCYTES NFR BLD AUTO: 0 % (ref 0–2)
LYMPHOCYTES # BLD AUTO: 1.66 THOUSANDS/ΜL (ref 0.6–4.47)
LYMPHOCYTES NFR BLD AUTO: 31 % (ref 14–44)
MCH RBC QN AUTO: 36.2 PG (ref 26.8–34.3)
MCHC RBC AUTO-ENTMCNC: 34.4 G/DL (ref 31.4–37.4)
MCV RBC AUTO: 105 FL (ref 82–98)
METHADONE UR QL: NEGATIVE
MONOCYTES # BLD AUTO: 0.48 THOUSAND/ΜL (ref 0.17–1.22)
MONOCYTES NFR BLD AUTO: 9 % (ref 4–12)
NEUTROPHILS # BLD AUTO: 3.14 THOUSANDS/ΜL (ref 1.85–7.62)
NEUTS SEG NFR BLD AUTO: 59 % (ref 43–75)
NRBC BLD AUTO-RTO: 1 /100 WBCS
OPIATES UR QL SCN: NEGATIVE
OXYCODONE+OXYMORPHONE UR QL SCN: NEGATIVE
PCP UR QL: NEGATIVE
PLATELET # BLD AUTO: 116 THOUSANDS/UL (ref 149–390)
PMV BLD AUTO: 9 FL (ref 8.9–12.7)
POTASSIUM SERPL-SCNC: 3.3 MMOL/L (ref 3.5–5.3)
POTASSIUM SERPL-SCNC: 3.6 MMOL/L (ref 3.5–5.3)
PROT SERPL-MCNC: 7.9 G/DL (ref 6.4–8.2)
RBC # BLD AUTO: 3.34 MILLION/UL (ref 3.81–5.12)
SODIUM SERPL-SCNC: 129 MMOL/L (ref 136–145)
SODIUM SERPL-SCNC: 132 MMOL/L (ref 136–145)
THC UR QL: POSITIVE
WBC # BLD AUTO: 5.34 THOUSAND/UL (ref 4.31–10.16)

## 2020-12-14 PROCEDURE — 80053 COMPREHEN METABOLIC PANEL: CPT | Performed by: EMERGENCY MEDICINE

## 2020-12-14 PROCEDURE — 99284 EMERGENCY DEPT VISIT MOD MDM: CPT

## 2020-12-14 PROCEDURE — 96361 HYDRATE IV INFUSION ADD-ON: CPT

## 2020-12-14 PROCEDURE — 96374 THER/PROPH/DIAG INJ IV PUSH: CPT

## 2020-12-14 PROCEDURE — 80048 BASIC METABOLIC PNL TOTAL CA: CPT | Performed by: EMERGENCY MEDICINE

## 2020-12-14 PROCEDURE — 80320 DRUG SCREEN QUANTALCOHOLS: CPT | Performed by: EMERGENCY MEDICINE

## 2020-12-14 PROCEDURE — 99285 EMERGENCY DEPT VISIT HI MDM: CPT | Performed by: EMERGENCY MEDICINE

## 2020-12-14 PROCEDURE — 81025 URINE PREGNANCY TEST: CPT | Performed by: EMERGENCY MEDICINE

## 2020-12-14 PROCEDURE — 80307 DRUG TEST PRSMV CHEM ANLYZR: CPT | Performed by: EMERGENCY MEDICINE

## 2020-12-14 PROCEDURE — 85025 COMPLETE CBC W/AUTO DIFF WBC: CPT | Performed by: EMERGENCY MEDICINE

## 2020-12-14 PROCEDURE — 93005 ELECTROCARDIOGRAM TRACING: CPT

## 2020-12-14 PROCEDURE — 36415 COLL VENOUS BLD VENIPUNCTURE: CPT | Performed by: EMERGENCY MEDICINE

## 2020-12-14 PROCEDURE — 71045 X-RAY EXAM CHEST 1 VIEW: CPT

## 2020-12-14 RX ORDER — CHLORDIAZEPOXIDE HYDROCHLORIDE 5 MG/1
5 CAPSULE, GELATIN COATED ORAL 2 TIMES DAILY
Qty: 8 CAPSULE | Refills: 0 | Status: SHIPPED | OUTPATIENT
Start: 2020-12-14 | End: 2021-03-04

## 2020-12-14 RX ORDER — LORAZEPAM 2 MG/ML
1 INJECTION INTRAMUSCULAR ONCE
Status: COMPLETED | OUTPATIENT
Start: 2020-12-14 | End: 2020-12-14

## 2020-12-14 RX ORDER — SODIUM CHLORIDE 9 MG/ML
150 INJECTION, SOLUTION INTRAVENOUS CONTINUOUS
Status: DISCONTINUED | OUTPATIENT
Start: 2020-12-14 | End: 2020-12-15 | Stop reason: HOSPADM

## 2020-12-14 RX ADMIN — SODIUM CHLORIDE 2000 ML: 0.9 INJECTION, SOLUTION INTRAVENOUS at 19:09

## 2020-12-14 RX ADMIN — SODIUM CHLORIDE 150 ML/HR: 0.9 INJECTION, SOLUTION INTRAVENOUS at 20:48

## 2020-12-14 RX ADMIN — LORAZEPAM 1 MG: 2 INJECTION INTRAMUSCULAR; INTRAVENOUS at 19:10

## 2020-12-17 LAB
ATRIAL RATE: 142 BPM
P AXIS: 55 DEGREES
PR INTERVAL: 136 MS
QRS AXIS: 93 DEGREES
QRSD INTERVAL: 80 MS
QT INTERVAL: 350 MS
QTC INTERVAL: 538 MS
T WAVE AXIS: 44 DEGREES
VENTRICULAR RATE: 142 BPM

## 2021-03-04 ENCOUNTER — APPOINTMENT (EMERGENCY)
Dept: RADIOLOGY | Facility: HOSPITAL | Age: 41
End: 2021-03-04
Payer: COMMERCIAL

## 2021-03-04 ENCOUNTER — HOSPITAL ENCOUNTER (OUTPATIENT)
Facility: HOSPITAL | Age: 41
Setting detail: OBSERVATION
Discharge: HOME | End: 2021-03-05
Attending: EMERGENCY MEDICINE | Admitting: STUDENT IN AN ORGANIZED HEALTH CARE EDUCATION/TRAINING PROGRAM
Payer: COMMERCIAL

## 2021-03-04 DIAGNOSIS — F10.239 ALCOHOL WITHDRAWAL (HCC): Primary | ICD-10-CM

## 2021-03-04 DIAGNOSIS — E87.6 HYPOKALEMIA: ICD-10-CM

## 2021-03-04 DIAGNOSIS — E83.42 HYPOMAGNESEMIA: ICD-10-CM

## 2021-03-04 DIAGNOSIS — N17.9 AKI (ACUTE KIDNEY INJURY) (HCC): ICD-10-CM

## 2021-03-04 PROBLEM — F41.9 ANXIETY: Status: ACTIVE | Noted: 2021-03-04

## 2021-03-04 LAB
ALBUMIN SERPL BCP-MCNC: 4.4 G/DL (ref 3.5–5)
ALP SERPL-CCNC: 89 U/L (ref 46–116)
ALT SERPL W P-5'-P-CCNC: 39 U/L (ref 12–78)
ANION GAP SERPL CALCULATED.3IONS-SCNC: 25 MMOL/L (ref 4–13)
ANION GAP SERPL CALCULATED.3IONS-SCNC: 6 MMOL/L (ref 4–13)
AST SERPL W P-5'-P-CCNC: 81 U/L (ref 5–45)
BACTERIA UR QL AUTO: ABNORMAL /HPF
BASOPHILS # BLD AUTO: 0.03 THOUSANDS/ΜL (ref 0–0.1)
BASOPHILS NFR BLD AUTO: 0 % (ref 0–1)
BILIRUB SERPL-MCNC: 1.64 MG/DL (ref 0.2–1)
BILIRUB UR QL STRIP: ABNORMAL
BUN SERPL-MCNC: 10 MG/DL (ref 5–25)
BUN SERPL-MCNC: 12 MG/DL (ref 5–25)
CALCIUM SERPL-MCNC: 8 MG/DL (ref 8.3–10.1)
CALCIUM SERPL-MCNC: 8.9 MG/DL (ref 8.3–10.1)
CHLORIDE SERPL-SCNC: 88 MMOL/L (ref 100–108)
CHLORIDE SERPL-SCNC: 97 MMOL/L (ref 100–108)
CLARITY UR: ABNORMAL
CO2 SERPL-SCNC: 19 MMOL/L (ref 21–32)
CO2 SERPL-SCNC: 30 MMOL/L (ref 21–32)
COLOR UR: YELLOW
CREAT SERPL-MCNC: 0.75 MG/DL (ref 0.6–1.3)
CREAT SERPL-MCNC: 1.44 MG/DL (ref 0.6–1.3)
EOSINOPHIL # BLD AUTO: 0.02 THOUSAND/ΜL (ref 0–0.61)
EOSINOPHIL NFR BLD AUTO: 0 % (ref 0–6)
ERYTHROCYTE [DISTWIDTH] IN BLOOD BY AUTOMATED COUNT: 13.4 % (ref 11.6–15.1)
ETHANOL SERPL-MCNC: <3 MG/DL (ref 0–3)
FLUAV RNA RESP QL NAA+PROBE: NEGATIVE
FLUBV RNA RESP QL NAA+PROBE: NEGATIVE
GFR SERPL CREATININE-BSD FRML MDRD: 45 ML/MIN/1.73SQ M
GFR SERPL CREATININE-BSD FRML MDRD: 99 ML/MIN/1.73SQ M
GLUCOSE SERPL-MCNC: 113 MG/DL (ref 65–140)
GLUCOSE SERPL-MCNC: 203 MG/DL (ref 65–140)
GLUCOSE UR STRIP-MCNC: NEGATIVE MG/DL
HCT VFR BLD AUTO: 42.2 % (ref 34.8–46.1)
HGB BLD-MCNC: 15 G/DL (ref 11.5–15.4)
HGB UR QL STRIP.AUTO: NEGATIVE
IMM GRANULOCYTES # BLD AUTO: 0.02 THOUSAND/UL (ref 0–0.2)
IMM GRANULOCYTES NFR BLD AUTO: 0 % (ref 0–2)
KETONES UR STRIP-MCNC: ABNORMAL MG/DL
LEUKOCYTE ESTERASE UR QL STRIP: ABNORMAL
LYMPHOCYTES # BLD AUTO: 3.5 THOUSANDS/ΜL (ref 0.6–4.47)
LYMPHOCYTES NFR BLD AUTO: 40 % (ref 14–44)
MAGNESIUM SERPL-MCNC: 1 MG/DL (ref 1.6–2.6)
MAGNESIUM SERPL-MCNC: 1.7 MG/DL (ref 1.6–2.6)
MCH RBC QN AUTO: 35.2 PG (ref 26.8–34.3)
MCHC RBC AUTO-ENTMCNC: 35.5 G/DL (ref 31.4–37.4)
MCV RBC AUTO: 99 FL (ref 82–98)
MONOCYTES # BLD AUTO: 0.6 THOUSAND/ΜL (ref 0.17–1.22)
MONOCYTES NFR BLD AUTO: 7 % (ref 4–12)
NEUTROPHILS # BLD AUTO: 4.49 THOUSANDS/ΜL (ref 1.85–7.62)
NEUTS SEG NFR BLD AUTO: 53 % (ref 43–75)
NITRITE UR QL STRIP: NEGATIVE
NON-SQ EPI CELLS URNS QL MICRO: ABNORMAL /HPF
NRBC BLD AUTO-RTO: 0 /100 WBCS
OTHER STN SPEC: ABNORMAL
PH UR STRIP.AUTO: 8.5 [PH]
PLATELET # BLD AUTO: 145 THOUSANDS/UL (ref 149–390)
PMV BLD AUTO: 9.8 FL (ref 8.9–12.7)
POTASSIUM SERPL-SCNC: 2.4 MMOL/L (ref 3.5–5.3)
POTASSIUM SERPL-SCNC: 3.2 MMOL/L (ref 3.5–5.3)
PROT SERPL-MCNC: 9.2 G/DL (ref 6.4–8.2)
PROT UR STRIP-MCNC: NEGATIVE MG/DL
RBC # BLD AUTO: 4.26 MILLION/UL (ref 3.81–5.12)
RBC #/AREA URNS AUTO: ABNORMAL /HPF
RSV RNA RESP QL NAA+PROBE: NEGATIVE
SARS-COV-2 RNA RESP QL NAA+PROBE: NEGATIVE
SODIUM SERPL-SCNC: 132 MMOL/L (ref 136–145)
SODIUM SERPL-SCNC: 133 MMOL/L (ref 136–145)
SP GR UR STRIP.AUTO: 1.02 (ref 1–1.03)
UROBILINOGEN UR QL STRIP.AUTO: 4 E.U./DL
WBC # BLD AUTO: 8.66 THOUSAND/UL (ref 4.31–10.16)
WBC #/AREA URNS AUTO: ABNORMAL /HPF

## 2021-03-04 PROCEDURE — 36415 COLL VENOUS BLD VENIPUNCTURE: CPT | Performed by: EMERGENCY MEDICINE

## 2021-03-04 PROCEDURE — 0241U HB NFCT DS VIR RESP RNA 4 TRGT: CPT | Performed by: EMERGENCY MEDICINE

## 2021-03-04 PROCEDURE — 80053 COMPREHEN METABOLIC PANEL: CPT | Performed by: EMERGENCY MEDICINE

## 2021-03-04 PROCEDURE — 83735 ASSAY OF MAGNESIUM: CPT | Performed by: EMERGENCY MEDICINE

## 2021-03-04 PROCEDURE — 82077 ASSAY SPEC XCP UR&BREATH IA: CPT | Performed by: EMERGENCY MEDICINE

## 2021-03-04 PROCEDURE — 83735 ASSAY OF MAGNESIUM: CPT | Performed by: STUDENT IN AN ORGANIZED HEALTH CARE EDUCATION/TRAINING PROGRAM

## 2021-03-04 PROCEDURE — 85025 COMPLETE CBC W/AUTO DIFF WBC: CPT | Performed by: EMERGENCY MEDICINE

## 2021-03-04 PROCEDURE — 93005 ELECTROCARDIOGRAM TRACING: CPT

## 2021-03-04 PROCEDURE — 81001 URINALYSIS AUTO W/SCOPE: CPT | Performed by: EMERGENCY MEDICINE

## 2021-03-04 PROCEDURE — 96361 HYDRATE IV INFUSION ADD-ON: CPT

## 2021-03-04 PROCEDURE — 99285 EMERGENCY DEPT VISIT HI MDM: CPT

## 2021-03-04 PROCEDURE — 80048 BASIC METABOLIC PNL TOTAL CA: CPT | Performed by: STUDENT IN AN ORGANIZED HEALTH CARE EDUCATION/TRAINING PROGRAM

## 2021-03-04 PROCEDURE — 99220 PR INITIAL OBSERVATION CARE/DAY 70 MINUTES: CPT | Performed by: STUDENT IN AN ORGANIZED HEALTH CARE EDUCATION/TRAINING PROGRAM

## 2021-03-04 PROCEDURE — 99285 EMERGENCY DEPT VISIT HI MDM: CPT | Performed by: EMERGENCY MEDICINE

## 2021-03-04 PROCEDURE — 96365 THER/PROPH/DIAG IV INF INIT: CPT

## 2021-03-04 PROCEDURE — 71045 X-RAY EXAM CHEST 1 VIEW: CPT

## 2021-03-04 PROCEDURE — 96375 TX/PRO/DX INJ NEW DRUG ADDON: CPT

## 2021-03-04 RX ORDER — LORAZEPAM 2 MG/ML
2 INJECTION INTRAMUSCULAR EVERY 4 HOURS PRN
Status: DISCONTINUED | OUTPATIENT
Start: 2021-03-04 | End: 2021-03-05 | Stop reason: HOSPADM

## 2021-03-04 RX ORDER — ESCITALOPRAM OXALATE 10 MG/1
20 TABLET ORAL
Status: DISCONTINUED | OUTPATIENT
Start: 2021-03-04 | End: 2021-03-05 | Stop reason: HOSPADM

## 2021-03-04 RX ORDER — POTASSIUM CHLORIDE 20 MEQ/1
20 TABLET, EXTENDED RELEASE ORAL DAILY
Status: DISCONTINUED | OUTPATIENT
Start: 2021-03-04 | End: 2021-03-05

## 2021-03-04 RX ORDER — POTASSIUM CHLORIDE 20 MEQ/1
20 TABLET, EXTENDED RELEASE ORAL DAILY
COMMUNITY
Start: 2020-12-22

## 2021-03-04 RX ORDER — SODIUM CHLORIDE, SODIUM LACTATE, POTASSIUM CHLORIDE, CALCIUM CHLORIDE 600; 310; 30; 20 MG/100ML; MG/100ML; MG/100ML; MG/100ML
100 INJECTION, SOLUTION INTRAVENOUS CONTINUOUS
Status: DISCONTINUED | OUTPATIENT
Start: 2021-03-04 | End: 2021-03-05 | Stop reason: HOSPADM

## 2021-03-04 RX ORDER — MAGNESIUM SULFATE HEPTAHYDRATE 40 MG/ML
2 INJECTION, SOLUTION INTRAVENOUS ONCE
Status: COMPLETED | OUTPATIENT
Start: 2021-03-04 | End: 2021-03-04

## 2021-03-04 RX ORDER — ONDANSETRON 2 MG/ML
4 INJECTION INTRAMUSCULAR; INTRAVENOUS ONCE
Status: COMPLETED | OUTPATIENT
Start: 2021-03-04 | End: 2021-03-04

## 2021-03-04 RX ORDER — POTASSIUM CHLORIDE 20 MEQ/1
40 TABLET, EXTENDED RELEASE ORAL ONCE
Status: COMPLETED | OUTPATIENT
Start: 2021-03-04 | End: 2021-03-04

## 2021-03-04 RX ORDER — ESCITALOPRAM OXALATE 20 MG/1
20 TABLET ORAL
Status: ON HOLD | COMMUNITY
Start: 2021-02-04 | End: 2021-08-09

## 2021-03-04 RX ORDER — LORAZEPAM 2 MG/ML
2 INJECTION INTRAMUSCULAR ONCE
Status: COMPLETED | OUTPATIENT
Start: 2021-03-04 | End: 2021-03-04

## 2021-03-04 RX ORDER — POTASSIUM CHLORIDE 14.9 MG/ML
20 INJECTION INTRAVENOUS
Status: COMPLETED | OUTPATIENT
Start: 2021-03-04 | End: 2021-03-04

## 2021-03-04 RX ORDER — LISINOPRIL 5 MG/1
5 TABLET ORAL DAILY
Status: DISCONTINUED | OUTPATIENT
Start: 2021-03-04 | End: 2021-03-04

## 2021-03-04 RX ORDER — AMLODIPINE BESYLATE 5 MG/1
5 TABLET ORAL DAILY
Status: DISCONTINUED | OUTPATIENT
Start: 2021-03-05 | End: 2021-03-05

## 2021-03-04 RX ORDER — POTASSIUM CHLORIDE 20 MEQ/1
20 TABLET, EXTENDED RELEASE ORAL DAILY
Status: DISCONTINUED | OUTPATIENT
Start: 2021-03-04 | End: 2021-03-04

## 2021-03-04 RX ORDER — LANOLIN ALCOHOL/MO/W.PET/CERES
100 CREAM (GRAM) TOPICAL DAILY
Status: DISCONTINUED | OUTPATIENT
Start: 2021-03-04 | End: 2021-03-05 | Stop reason: HOSPADM

## 2021-03-04 RX ORDER — FOLIC ACID 1 MG/1
1 TABLET ORAL DAILY
Status: DISCONTINUED | OUTPATIENT
Start: 2021-03-04 | End: 2021-03-05 | Stop reason: HOSPADM

## 2021-03-04 RX ORDER — SODIUM CHLORIDE 9 MG/ML
250 INJECTION, SOLUTION INTRAVENOUS CONTINUOUS
Status: DISCONTINUED | OUTPATIENT
Start: 2021-03-04 | End: 2021-03-05 | Stop reason: HOSPADM

## 2021-03-04 RX ORDER — ONDANSETRON 2 MG/ML
4 INJECTION INTRAMUSCULAR; INTRAVENOUS EVERY 6 HOURS PRN
Status: DISCONTINUED | OUTPATIENT
Start: 2021-03-04 | End: 2021-03-05 | Stop reason: HOSPADM

## 2021-03-04 RX ADMIN — SODIUM CHLORIDE 1000 ML: 0.9 INJECTION, SOLUTION INTRAVENOUS at 08:02

## 2021-03-04 RX ADMIN — ONDANSETRON 4 MG: 2 INJECTION INTRAMUSCULAR; INTRAVENOUS at 08:07

## 2021-03-04 RX ADMIN — Medication 400 MG: at 09:47

## 2021-03-04 RX ADMIN — Medication 400 MG: at 16:46

## 2021-03-04 RX ADMIN — ENOXAPARIN SODIUM 40 MG: 40 INJECTION SUBCUTANEOUS at 12:14

## 2021-03-04 RX ADMIN — MAGNESIUM SULFATE IN WATER 2 G: 40 INJECTION, SOLUTION INTRAVENOUS at 08:56

## 2021-03-04 RX ADMIN — THIAMINE HCL TAB 100 MG 100 MG: 100 TAB at 12:14

## 2021-03-04 RX ADMIN — SODIUM CHLORIDE, SODIUM LACTATE, POTASSIUM CHLORIDE, AND CALCIUM CHLORIDE 100 ML/HR: .6; .31; .03; .02 INJECTION, SOLUTION INTRAVENOUS at 16:45

## 2021-03-04 RX ADMIN — MULTIPLE VITAMINS W/ MINERALS TAB 1 TABLET: TAB at 12:14

## 2021-03-04 RX ADMIN — SODIUM CHLORIDE 250 ML/HR: 0.9 INJECTION, SOLUTION INTRAVENOUS at 09:41

## 2021-03-04 RX ADMIN — POTASSIUM CHLORIDE 20 MEQ: 14.9 INJECTION, SOLUTION INTRAVENOUS at 12:36

## 2021-03-04 RX ADMIN — POTASSIUM CHLORIDE 20 MEQ: 1500 TABLET, EXTENDED RELEASE ORAL at 16:45

## 2021-03-04 RX ADMIN — LORAZEPAM 2 MG: 2 INJECTION INTRAMUSCULAR; INTRAVENOUS at 23:02

## 2021-03-04 RX ADMIN — FOLIC ACID 1 MG: 1 TABLET ORAL at 12:14

## 2021-03-04 RX ADMIN — ESCITALOPRAM OXALATE 20 MG: 10 TABLET ORAL at 21:19

## 2021-03-04 RX ADMIN — POTASSIUM CHLORIDE 20 MEQ: 14.9 INJECTION, SOLUTION INTRAVENOUS at 09:43

## 2021-03-04 RX ADMIN — POTASSIUM CHLORIDE 40 MEQ: 1500 TABLET, EXTENDED RELEASE ORAL at 08:59

## 2021-03-04 RX ADMIN — LORAZEPAM 2 MG: 2 INJECTION INTRAMUSCULAR; INTRAVENOUS at 08:03

## 2021-03-04 NOTE — H&P
H&P- Kathi Degroot 1980, 38 y o  female MRN: [id number]    Unit/Bed#: -01 Encounter: [id number]    Primary Care Provider: Gage Escobar MD   Date and time admitted to hospital: 3/4/2021  7:54 AM        * Alcohol withdrawal ([facility] [address] )  Assessment & Plan  · Patient presenting with tremors and cramping over extremities associated with nausea and vomiting and inability to tolerate po  · Symptoms likely secondary to alcohol withdrawal  · Patient drinks 1 pint of alcohol a day but had not had a drink in 1-2 days  · Endorses having withdrawal in the past and having alcohol withdrawal related seizures  · CIWA protocol  · Ativan prn  · CIWA at this time - 7  · Thiamine, Folic acid, MVs daily  · Electrolyte repletion  · IVF maintenance    Intractable nausea and vomiting  Assessment & Plan  · Likely secondary to alcohol withdrawal  · Zofran prn  · Maintain on IVF for now  · Diet as tolerated    Acute kidney injury [facility])  Assessment & Plan  · Patient presenting with Creatinine of 1 4 (baseline around 0 7 in 12/2020)  · Likely secondary to volume depletion and vomiting  · Will continue to monitor Creatinine  · Monitor I&Os  · Avoid nephrotoxic agents  · IVF hydration  · Lisinopril held, switched to Amlodipine for now    Hypokalemia  Assessment & Plan  · Likely from poor po intake  · Monitor and replete  · Maintain on home K supplementation with additional supplementation if needed    Hypomagnesemia  Assessment & Plan  · Likely from chronic alcohol use  · Replete accordingly  · Will maintain on home supplementation and give additional supplementation as needed    Essential hypertension  Assessment & Plan  · Initial BP on arrival to ED elevated however repeat BP has been within acceptable limits  · Will continue to monitor  · The patient is on Lisinopril 5 mg daily at home however will switch this to Amlodipine 5 mg for now as patient presenting with CIRO    Transaminitis  Assessment & Plan  · Patient states she had been diagnosed with fatty liver by her PCP  · Continue to monitor LFTs  · Avoid hepatotoxic agents    Tobacco abuse  Assessment & Plan  · Patient smokes 1/2 pack of cigarettes a day  · Tobacco cessation counseling given  · Offered patient nicotine patches however states she does not need them    Anxiety  Assessment & Plan  · Patient on Escitalopram at home - continue      VTE Prophylaxis: Enoxaparin (Lovenox)  / sequential compression device   Code Status: FULL CODE  POLST: There is no POLST form on file for this patient (pre-hospital)  Discussion with family: patient    Anticipated Length of Stay:  Patient will be admitted on an Observation basis with an anticipated length of stay of  Less than 2 midnights  Justification for Hospital Stay: alcohol withdrawal    Total Time for Visit, including Counseling / Coordination of Care: 45 minutes  Greater than 50% of this total time spent on direct patient counseling and coordination of care  Chief Complaint:   Cramping over upper extremities and tremors    History of Present Illness:    Kalani Fraser is a 38 y o  female who presents with cramping over upper extremities and tremors which she started to experience while in the bathroom earlier this am  Patient endorses that she felt lightheaded and felt like she was going to pass out so she came out of the bathroom and immediately decided to come to the ED  The patient endorses associated nausea and vomiting with inability to tolerate po  The patient denies any loss of consciousness, blurring of vision, headache, chest pain, palpitations  Denies any abdominal pain  Denies diarrhea  Denies weakness  The patient endorses that she drinks 1 pint of alcohol daily however has not drank anything in the last 2 days  Endorses that she has had alcohol withdrawal in the past and has been treated for alcohol related seizures years ago  Endorses tobacco and marijuana use       Review of Systems:    Review of Systems Constitutional: Negative for chills and fever  HENT: Negative for ear pain and sore throat  Eyes: Negative for pain and visual disturbance  Respiratory: Negative for cough and shortness of breath  Cardiovascular: Negative for chest pain and palpitations  Gastrointestinal: Positive for nausea and vomiting  Negative for abdominal pain  Genitourinary: Negative for dysuria and hematuria  Musculoskeletal: Negative for arthralgias and back pain  Skin: Negative for color change and rash  Neurological: Positive for tremors, weakness and light-headedness  Negative for seizures and syncope  All other systems reviewed and are negative  Past Medical and Surgical History:     Past Medical History:   Diagnosis Date    Alcohol abuse     Asthma     Hypertension     Seizure ([address] )     Tubal ligation status        Past Surgical History:   Procedure Laterality Date    ANKLE SURGERY      BREAST BIOPSY      SALPINGOOPHORECTOMY      TUBAL LIGATION         Meds/Allergies:    Prior to Admission medications    Medication Sig Start Date End Date Taking?  Authorizing Provider   escitalopram (LEXAPRO) 20 mg tablet Take 20 mg by mouth daily at bedtime  2/4/21 2/4/22 Yes Historical Provider, MD   folic acid (FOLVITE) 1 mg tablet Take 1 tablet (1 mg total) by mouth daily 7/7/20  Yes Elias Abdullahi MD   lisinopril (ZESTRIL) 5 mg tablet Take 5 mg by mouth daily 11/26/19  Yes Historical Provider, MD   magnesium 30 MG tablet Take 30 mg by mouth daily Unknown dosage    Yes Historical Provider, MD   potassium chloride (K-DUR,KLOR-CON) 20 mEq tablet Take 20 mEq by mouth daily 12/22/20  Yes Historical Provider, MD   chlordiazePOXIDE (LIBRIUM) 5 mg capsule Take 1 capsule (5 mg total) by mouth 2 (two) times a day for 4 days 12/14/20 3/4/21  Ike Isbell DO   thiamine (VITAMIN B1) 100 mg tablet Take 100 mg by mouth daily Unknown dosage  3/4/21  Historical Provider, MD     I have reviewed home medications with patient personally  Allergies: Allergies   Allergen Reactions    Tylenol [Acetaminophen] Other (See Comments)     R/t fatty liver    Medical Tape Rash       Social History:     Marital Status: /Civil Union   Patient Pre-hospital Living Situation: lives at home with   Patient Pre-hospital Level of Mobility: ambulatory and independent  Patient Pre-hospital Diet Restrictions: none  Substance Use History:   Social History     Substance and Sexual Activity   Alcohol Use Yes    Frequency: 4 or more times a week    Drinks per session: 10 or more    Binge frequency: Daily or almost daily    Comment: One pint of vodka per day     Social History     Tobacco Use   Smoking Status Current Every Day Smoker    Packs/day: 0 50    Types: Cigarettes   Smokeless Tobacco Never Used     Social History     Substance and Sexual Activity   Drug Use Yes    Types: Marijuana       Family History:    father hypertension    Physical Exam:     Vitals:   Blood Pressure: 139/90 (03/04/21 1530)  Pulse: 69 (03/04/21 1530)  Temperature: 98 7 °F (37 1 °C) (03/04/21 1435)  Temp Source: Temporal (03/04/21 0759)  Respirations: 18 (03/04/21 1128)  Height: 4' 11" (149 9 cm) (03/04/21 0759)  Weight - Scale: 55 kg (121 lb 4 1 oz) (03/04/21 1438)  SpO2: 96 % (03/04/21 1435)    Physical Exam  Vitals signs and nursing note reviewed  Constitutional:       General: She is not in acute distress  Appearance: She is well-developed  HENT:      Head: Normocephalic and atraumatic  Eyes:      Conjunctiva/sclera: Conjunctivae normal    Neck:      Musculoskeletal: Neck supple  Cardiovascular:      Rate and Rhythm: Normal rate and regular rhythm  Heart sounds: No murmur  Pulmonary:      Effort: Pulmonary effort is normal  No respiratory distress  Breath sounds: Normal breath sounds  Abdominal:      Palpations: Abdomen is soft  Tenderness: There is no abdominal tenderness  Skin:     General: Skin is warm and dry  Neurological:      General: No focal deficit present  Mental Status: She is alert and oriented to person, place, and time  Cranial Nerves: No cranial nerve deficit  Sensory: No sensory deficit  Motor: No weakness  Comments: Mild tremors when arms outstretched   Mild tongue fasciculations              Additional Data:     Lab Results: I have personally reviewed pertinent reports  Results from last 7 days   Lab Units 03/04/21  0804   WBC Thousand/uL 8 66   HEMOGLOBIN g/dL 15 0   HEMATOCRIT % 42 2   PLATELETS Thousands/uL 145*   NEUTROS PCT % 53   LYMPHS PCT % 40   MONOS PCT % 7   EOS PCT % 0     Results from last 7 days   Lab Units 03/04/21  1508 03/04/21  0804   SODIUM mmol/L 133* 132*   POTASSIUM mmol/L 3 2* 2 4*   CHLORIDE mmol/L 97* 88*   CO2 mmol/L 30 19*   BUN mg/dL 10 12   CREATININE mg/dL 0 75 1 44*   ANION GAP mmol/L 6 25*   CALCIUM mg/dL 8 0* 8 9   ALBUMIN g/dL  --  4 4   TOTAL BILIRUBIN mg/dL  --  1 64*   ALK PHOS U/L  --  89   ALT U/L  --  39   AST U/L  --  81*   GLUCOSE RANDOM mg/dL 113 203*                       Imaging: I have personally reviewed pertinent reports  XR chest 1 view portable   ED Interpretation by Nadia Franklin DO (63/21 2512)   Normal appearing      Final Result by Carver Gowers, MD (03/04 1041)      No acute cardiopulmonary disease  Workstation performed: [id number]             EKG, Pathology, and Other Studies Reviewed on Admission:   · EKG: reviewed    [facility] Epic [facility] Reviewed: Yes     ** Please Note: This note has been constructed using a voice recognition system   **

## 2021-03-04 NOTE — PLAN OF CARE
Problem: Potential for Falls  Goal: Patient will remain free of falls  Description: INTERVENTIONS:  - Assess patient frequently for physical needs  -  Identify cognitive and physical deficits and behaviors that affect risk of falls    -  [place] fall precautions as indicated by assessment   - Educate patient/family on patient safety including physical limitations  - Instruct patient to call for assistance with activity based on assessment  - Modify environment to reduce risk of injury  - Consider OT/PT consult to assist with strengthening/mobility  Outcome: Progressing     Problem: METABOLIC, FLUID AND ELECTROLYTES - ADULT  Goal: Electrolytes maintained within normal limits  Description: INTERVENTIONS:  - Monitor labs and assess patient for signs and symptoms of electrolyte imbalances  - Administer electrolyte replacement as ordered  - Monitor response to electrolyte replacements, including repeat lab results as appropriate  - Instruct patient on fluid and nutrition as appropriate  Outcome: Progressing  Goal: Fluid balance maintained  Description: INTERVENTIONS:  - Monitor labs   - Monitor I/O and WT  - Instruct patient on fluid and nutrition as appropriate  - Assess for signs & symptoms of volume excess or deficit  Outcome: Progressing  Goal: Glucose maintained within target range  Description: INTERVENTIONS:  - Monitor Blood Glucose as ordered  - Assess for signs and symptoms of hyperglycemia and hypoglycemia  - Administer ordered medications to maintain glucose within target range  - Assess nutritional intake and initiate nutrition service referral as needed  Outcome: Progressing     Problem: MUSCULOSKELETAL - ADULT  Goal: Maintain or return mobility to safest level of function  Description: INTERVENTIONS:  - Assess patient's ability to carry out ADLs; assess patient's baseline for ADL function and identify physical deficits which impact ability to perform ADLs (bathing, care of mouth/teeth, toileting, grooming, dressing, etc )  - Assess/evaluate cause of self-care deficits   - Assess range of motion  - Assess patient's mobility  - Assess patient's need for assistive devices and provide as appropriate  - Encourage maximum independence but intervene and supervise when necessary  - Involve family in performance of ADLs  - Assess for home care needs following discharge   - Consider OT consult to assist with ADL evaluation and planning for discharge  - Provide patient education as appropriate  Outcome: Progressing  Goal: Maintain proper alignment of affected body part  Description: INTERVENTIONS:  - Support, maintain and protect limb and body alignment  - Provide patient/ family with appropriate education  Outcome: Progressing     Problem: Nutrition/Hydration-ADULT  Goal: Nutrient/Hydration intake appropriate for improving, restoring or maintaining nutritional needs  Description: Monitor and assess patient's nutrition/hydration status for malnutrition  Collaborate with interdisciplinary team and initiate plan and interventions as ordered  Monitor patient's weight and dietary intake as ordered or per policy  Utilize nutrition screening tool and intervene as necessary  Determine patient's food preferences and provide high-protein, high-caloric foods as appropriate       INTERVENTIONS:  - Monitor oral intake, urinary output, labs, and treatment plans  - Assess nutrition and hydration status and recommend course of action  - Evaluate amount of meals eaten  - Assist patient with eating if necessary   - Allow adequate time for meals  - Recommend/ encourage appropriate diets, oral nutritional supplements, and vitamin/mineral supplements  - Order, calculate, and assess calorie counts as needed  - Recommend, monitor, and adjust tube feedings and TPN/PPN based on assessed needs  - Assess need for intravenous fluids  - Provide specific nutrition/hydration education as appropriate  - Include patient/family/caregiver in decisions related to nutrition  Outcome: Progressing     Problem: PAIN - ADULT  Goal: Verbalizes/displays adequate comfort level or baseline comfort level  Description: Interventions:  - Encourage patient to monitor pain and request assistance  - Assess pain using appropriate pain scale  - Administer analgesics based on type and severity of pain and evaluate response  - Implement non-pharmacological measures as appropriate and evaluate response  - Consider cultural and social influences on pain and pain management  - Notify physician/advanced practitioner if interventions unsuccessful or patient reports new pain  Outcome: Progressing     Problem: SAFETY ADULT  Goal: Patient will remain free of falls  Description: INTERVENTIONS:  - Assess patient frequently for physical needs  -  Identify cognitive and physical deficits and behaviors that affect risk of falls    -  [place] fall precautions as indicated by assessment   - Educate patient/family on patient safety including physical limitations  - Instruct patient to call for assistance with activity based on assessment  - Modify environment to reduce risk of injury  - Consider OT/PT consult to assist with strengthening/mobility  Outcome: Progressing  Goal: Maintain or return to baseline ADL function  Description: INTERVENTIONS:  -  Assess patient's ability to carry out ADLs; assess patient's baseline for ADL function and identify physical deficits which impact ability to perform ADLs (bathing, care of mouth/teeth, toileting, grooming, dressing, etc )  - Assess/evaluate cause of self-care deficits   - Assess range of motion  - Assess patient's mobility; develop plan if impaired  - Assess patient's need for assistive devices and provide as appropriate  - Encourage maximum independence but intervene and supervise when necessary  - Involve family in performance of ADLs  - Assess for home care needs following discharge   - Consider OT consult to assist with ADL evaluation and planning for discharge  - Provide patient education as appropriate  Outcome: Progressing  Goal: Maintain or return mobility status to optimal level  Description: INTERVENTIONS:  - Assess patient's baseline mobility status (ambulation, transfers, stairs, etc )    - Identify cognitive and physical deficits and behaviors that affect mobility  - Identify mobility aids required to assist with transfers and/or ambulation (gait belt, sit-to-stand, lift, walker, cane, etc )  - [place] fall precautions as indicated by assessment  - Record patient progress and toleration of activity level on Mobility SBAR; progress patient to next Phase/Stage  - Instruct patient to call for assistance with activity based on assessment  - Consider rehabilitation consult to assist with strengthening/weightbearing, etc   Outcome: Progressing     Problem: DISCHARGE PLANNING  Goal: Discharge to home or other facility with appropriate resources  Description: INTERVENTIONS:  - Identify barriers to discharge w/patient and caregiver  - Arrange for needed discharge resources and transportation as appropriate  - Identify discharge learning needs (meds, wound care, etc )  - Arrange for interpretive services to assist at discharge as needed  - Refer to Case Management Department for coordinating discharge planning if the patient needs post-hospital services based on physician/advanced practitioner order or complex needs related to functional status, cognitive ability, or social support system  Outcome: Progressing     Problem: Knowledge Deficit  Goal: Patient/family/caregiver demonstrates understanding of disease process, treatment plan, medications, and discharge instructions  Description: Complete learning assessment and assess knowledge base    Interventions:  - Provide teaching at level of understanding  - Provide teaching via preferred learning methods  Outcome: Progressing

## 2021-03-04 NOTE — ASSESSMENT & PLAN NOTE
· Initial BP on arrival to ED elevated however repeat BP has been within acceptable limits  · Will continue to monitor  · The patient is on Lisinopril 5 mg daily at home however will switch this to Amlodipine 5 mg for now as patient presenting with CIRO

## 2021-03-04 NOTE — PLAN OF CARE
Problem: Potential for Falls  Goal: Patient will remain free of falls  Description: INTERVENTIONS:  - Assess patient frequently for physical needs  -  Identify cognitive and physical deficits and behaviors that affect risk of falls    -  [place] fall precautions as indicated by assessment   - Educate patient/family on patient safety including physical limitations  - Instruct patient to call for assistance with activity based on assessment  - Modify environment to reduce risk of injury  - Consider OT/PT consult to assist with strengthening/mobility  Outcome: Progressing     Problem: METABOLIC, FLUID AND ELECTROLYTES - ADULT  Goal: Electrolytes maintained within normal limits  Description: INTERVENTIONS:  - Monitor labs and assess patient for signs and symptoms of electrolyte imbalances  - Administer electrolyte replacement as ordered  - Monitor response to electrolyte replacements, including repeat lab results as appropriate  - Instruct patient on fluid and nutrition as appropriate  Outcome: Progressing  Goal: Fluid balance maintained  Description: INTERVENTIONS:  - Monitor labs   - Monitor I/O and WT  - Instruct patient on fluid and nutrition as appropriate  - Assess for signs & symptoms of volume excess or deficit  Outcome: Progressing  Goal: Glucose maintained within target range  Description: INTERVENTIONS:  - Monitor Blood Glucose as ordered  - Assess for signs and symptoms of hyperglycemia and hypoglycemia  - Administer ordered medications to maintain glucose within target range  - Assess nutritional intake and initiate nutrition service referral as needed  Outcome: Progressing     Problem: MUSCULOSKELETAL - ADULT  Goal: Maintain or return mobility to safest level of function  Description: INTERVENTIONS:  - Assess patient's ability to carry out ADLs; assess patient's baseline for ADL function and identify physical deficits which impact ability to perform ADLs (bathing, care of mouth/teeth, toileting, grooming, dressing, etc )  - Assess/evaluate cause of self-care deficits   - Assess range of motion  - Assess patient's mobility  - Assess patient's need for assistive devices and provide as appropriate  - Encourage maximum independence but intervene and supervise when necessary  - Involve family in performance of ADLs  - Assess for home care needs following discharge   - Consider OT consult to assist with ADL evaluation and planning for discharge  - Provide patient education as appropriate  Outcome: Progressing  Goal: Maintain proper alignment of affected body part  Description: INTERVENTIONS:  - Support, maintain and protect limb and body alignment  - Provide patient/ family with appropriate education  Outcome: Progressing     Problem: Nutrition/Hydration-ADULT  Goal: Nutrient/Hydration intake appropriate for improving, restoring or maintaining nutritional needs  Description: Monitor and assess patient's nutrition/hydration status for malnutrition  Collaborate with interdisciplinary team and initiate plan and interventions as ordered  Monitor patient's weight and dietary intake as ordered or per policy  Utilize nutrition screening tool and intervene as necessary  Determine patient's food preferences and provide high-protein, high-caloric foods as appropriate       INTERVENTIONS:  - Monitor oral intake, urinary output, labs, and treatment plans  - Assess nutrition and hydration status and recommend course of action  - Evaluate amount of meals eaten  - Assist patient with eating if necessary   - Allow adequate time for meals  - Recommend/ encourage appropriate diets, oral nutritional supplements, and vitamin/mineral supplements  - Order, calculate, and assess calorie counts as needed  - Recommend, monitor, and adjust tube feedings and TPN/PPN based on assessed needs  - Assess need for intravenous fluids  - Provide specific nutrition/hydration education as appropriate  - Include patient/family/caregiver in decisions related to nutrition  Outcome: Progressing     Problem: PAIN - ADULT  Goal: Verbalizes/displays adequate comfort level or baseline comfort level  Description: Interventions:  - Encourage patient to monitor pain and request assistance  - Assess pain using appropriate pain scale  - Administer analgesics based on type and severity of pain and evaluate response  - Implement non-pharmacological measures as appropriate and evaluate response  - Consider cultural and social influences on pain and pain management  - Notify physician/advanced practitioner if interventions unsuccessful or patient reports new pain  Outcome: Progressing     Problem: SAFETY ADULT  Goal: Patient will remain free of falls  Description: INTERVENTIONS:  - Assess patient frequently for physical needs  -  Identify cognitive and physical deficits and behaviors that affect risk of falls    -  [place] fall precautions as indicated by assessment   - Educate patient/family on patient safety including physical limitations  - Instruct patient to call for assistance with activity based on assessment  - Modify environment to reduce risk of injury  - Consider OT/PT consult to assist with strengthening/mobility  Outcome: Progressing  Goal: Maintain or return to baseline ADL function  Description: INTERVENTIONS:  -  Assess patient's ability to carry out ADLs; assess patient's baseline for ADL function and identify physical deficits which impact ability to perform ADLs (bathing, care of mouth/teeth, toileting, grooming, dressing, etc )  - Assess/evaluate cause of self-care deficits   - Assess range of motion  - Assess patient's mobility; develop plan if impaired  - Assess patient's need for assistive devices and provide as appropriate  - Encourage maximum independence but intervene and supervise when necessary  - Involve family in performance of ADLs  - Assess for home care needs following discharge   - Consider OT consult to assist with ADL evaluation and planning for discharge  - Provide patient education as appropriate  Outcome: Progressing  Goal: Maintain or return mobility status to optimal level  Description: INTERVENTIONS:  - Assess patient's baseline mobility status (ambulation, transfers, stairs, etc )    - Identify cognitive and physical deficits and behaviors that affect mobility  - Identify mobility aids required to assist with transfers and/or ambulation (gait belt, sit-to-stand, lift, walker, cane, etc )  - [place] fall precautions as indicated by assessment  - Record patient progress and toleration of activity level on Mobility SBAR; progress patient to next Phase/Stage  - Instruct patient to call for assistance with activity based on assessment  - Consider rehabilitation consult to assist with strengthening/weightbearing, etc   Outcome: Progressing     Problem: DISCHARGE PLANNING  Goal: Discharge to home or other facility with appropriate resources  Description: INTERVENTIONS:  - Identify barriers to discharge w/patient and caregiver  - Arrange for needed discharge resources and transportation as appropriate  - Identify discharge learning needs (meds, wound care, etc )  - Arrange for interpretive services to assist at discharge as needed  - Refer to Case Management Department for coordinating discharge planning if the patient needs post-hospital services based on physician/advanced practitioner order or complex needs related to functional status, cognitive ability, or social support system  Outcome: Progressing     Problem: Knowledge Deficit  Goal: Patient/family/caregiver demonstrates understanding of disease process, treatment plan, medications, and discharge instructions  Description: Complete learning assessment and assess knowledge base    Interventions:  - Provide teaching at level of understanding  - Provide teaching via preferred learning methods  Outcome: Progressing

## 2021-03-04 NOTE — ASSESSMENT & PLAN NOTE
· Patient states she had been diagnosed with fatty liver by her PCP  · Continue to monitor LFTs  · Avoid hepatotoxic agents

## 2021-03-04 NOTE — ASSESSMENT & PLAN NOTE
· Likely from poor po intake  · Monitor and replete  · Maintain on home K supplementation with additional supplementation if needed

## 2021-03-04 NOTE — ASSESSMENT & PLAN NOTE
· Likely secondary to alcohol withdrawal  · Zofran prn  · Maintain on IVF for now  · Diet as tolerated

## 2021-03-04 NOTE — LETTER
[facility]  [address]  [facility] [address]  Dept: [phone]    March 5, 2021     Patient: Rosita Willingham   YOB: 1980   Date of Visit: 3/4/2021       To Whom it May Concern:    Rosita Willingham is under my professional care  She was seen in the hospital from 3/4/2021   to 03/05/21  She may return to school on 3/8 without limitations  If you have any questions or concerns, please don't hesitate to call           Sincerely,          Lorena Wallis MD

## 2021-03-04 NOTE — ED PROVIDER NOTES
History  Chief Complaint   Patient presents with    Withdrawal - Alcohol     states has not had anything to drink since Tuesday bc she is trying to quit, normally drinks 1 pint of vodka per day, this morning also started with a panic attack     48-year-old female complains shakiness worse at upper extremities with hand cramping bilaterally and perioral tingling that began just prior in shower getting ready for work with lightheadedness  No she had similar symptoms in recent past attributed to alcohol withdrawal   Notes she drinks about a pt of vodka daily and last drink 2 days ago  Notes she is trying to discontinue alcohol  Has been vomiting for the past 2 days mostly food and fluids  Unable to keep water down  Also complains of frequent urination, darker urine output without dysuria or hematuria  Denies injury  Denies headache, chest pain and dyspnea  Denies loss of taste or smell  No known Covid-19 history  No recent seizures known  Discontinued her neuroleptics approximately 1 5 years ago      History provided by:  Patient  Withdrawal - Alcohol  Similar prior episodes: yes    Onset quality:  Gradual  Timing:  Constant  Progression:  Worsening  Chronicity:  Recurrent  Suspected agents:  Alcohol and marijuana  Associated symptoms: nausea, seizures and vomiting    Associated symptoms: no abdominal pain and no confusion        Prior to Admission Medications   Prescriptions Last Dose Informant Patient Reported?  Taking?   escitalopram (LEXAPRO) 20 mg tablet   Yes Yes   Sig: Take 20 mg by mouth daily   folic acid (FOLVITE) 1 mg tablet   No Yes   Sig: Take 1 tablet (1 mg total) by mouth daily   lisinopril (ZESTRIL) 5 mg tablet   Yes Yes   Sig: Take 5 mg by mouth daily   magnesium 30 MG tablet   Yes Yes   Sig: Take 30 mg by mouth daily Unknown dosage    potassium chloride (K-DUR,KLOR-CON) 20 mEq tablet   Yes Yes   Sig: Take 20 mEq by mouth daily      Facility-Administered Medications: None       Past Medical History:   Diagnosis Date    Alcohol abuse     Asthma     Hypertension     Seizure ([address] )     Tubal ligation status        Past Surgical History:   Procedure Laterality Date    ANKLE SURGERY      BREAST BIOPSY      SALPINGOOPHORECTOMY      TUBAL LIGATION         Family History   Problem Relation Age of Onset    Hypertension Father      I have reviewed and agree with the history as documented  E-Cigarette/Vaping    E-Cigarette Use Current Every Day User      E-Cigarette/Vaping Substances    THC Yes      Social History     Tobacco Use    Smoking status: Current Every Day Smoker     Packs/day: 0 50     Types: Cigarettes    Smokeless tobacco: Never Used   Substance Use Topics    Alcohol use: Yes     Frequency: 4 or more times a week     Drinks per session: 10 or more     Binge frequency: Daily or almost daily     Comment: One pint of vodka per day    Drug use: Yes     Types: Marijuana       Review of Systems   Gastrointestinal: Positive for nausea and vomiting  Negative for abdominal pain  Neurological: Positive for seizures  Psychiatric/Behavioral: Negative for confusion  All other systems reviewed and are negative  Physical Exam  Physical Exam  Vitals signs and nursing note reviewed  Constitutional:       Comments: Pleasant, uncomfortable-appearing, conversational, generally tremulous, bilateral carpal spasm   HENT:      Head: Normocephalic and atraumatic  Eyes:      Conjunctiva/sclera: Conjunctivae normal       Pupils: Pupils are equal, round, and reactive to light  Neck:      Musculoskeletal: Neck supple  Cardiovascular:      Rate and Rhythm: Normal rate and regular rhythm  Heart sounds: Normal heart sounds  Pulmonary:      Effort: Pulmonary effort is normal       Breath sounds: Normal breath sounds  Abdominal:      General: Bowel sounds are normal  There is no distension  Palpations: Abdomen is soft  Tenderness: There is no abdominal tenderness  Musculoskeletal: Normal range of motion  Skin:     General: Skin is warm and dry  Neurological:      General: No focal deficit present  Mental Status: She is alert and oriented to person, place, and time  Cranial Nerves: No cranial nerve deficit  Coordination: Coordination normal    Psychiatric:         Behavior: Behavior normal          Thought Content:  Thought content normal          Judgment: Judgment normal          Vital Signs  ED Triage Vitals   Temperature Pulse Respirations Blood Pressure SpO2   03/04/21 0759 03/04/21 0759 03/04/21 0759 03/04/21 0802 03/04/21 0759   (!) 96 °F (35 6 °C) 97 20 (!) 166/107 98 %      Temp Source Heart Rate Source Patient Position - Orthostatic VS BP Location FiO2 (%)   03/04/21 0759 03/04/21 0759 03/04/21 0759 03/04/21 0759 --   Temporal Monitor Lying Right arm       Pain Score       --                  Vitals:    03/04/21 0759 03/04/21 0802 03/04/21 0830 03/04/21 0900   BP:  (!) 166/107 128/88 111/71   Pulse: 97  91 (!) 114   Patient Position - Orthostatic VS: Lying  Lying          Visual Acuity  Visual Acuity      Most Recent Value   L Pupil Size (mm)  3   R Pupil Size (mm)  3          ED Medications  Medications   magnesium sulfate 2 g/50 mL IVPB (premix) 2 g (2 g Intravenous New Bag 3/4/21 0856)   magnesium oxide (MAG-OX) tablet 400 mg (has no administration in time range)   potassium chloride 20 mEq IVPB (premix) (has no administration in time range)   sodium chloride 0 9 % bolus 1,000 mL (0 mL Intravenous Stopped 3/4/21 0858)   LORazepam (ATIVAN) injection 2 mg (2 mg Intravenous Given 3/4/21 0803)   ondansetron (ZOFRAN) injection 4 mg (4 mg Intravenous Given 3/4/21 0807)   potassium chloride (K-DUR,KLOR-CON) CR tablet 40 mEq (40 mEq Oral Given 3/4/21 0859)       Diagnostic Studies  Results Reviewed     Procedure Component Value Units Date/Time    COVID19, Influenza A/B, RSV PCR, [facility] [[id number]]  (Normal) Collected: 03/04/21 0804    Lab Status: Final result Specimen: Nares from Nasopharyngeal Swab Updated: 03/04/21 0901     SARS-CoV-2 Negative     INFLUENZA A PCR Negative     INFLUENZA B PCR Negative     RSV PCR Negative    Narrative: This test has been authorized by FDA under an EUA (Emergency Use Assay) for use by authorized laboratories  Clinical caution and judgement should be used with the interpretation of these results with consideration of the clinical impression and other laboratory testing  Testing reported as "Positive" or "Negative" has been proven to be accurate according to standard laboratory validation requirements  All testing is performed with control materials showing appropriate reactivity at standard intervals      Comprehensive metabolic panel [[id number]]  (Abnormal) Collected: 03/04/21 0804    Lab Status: Final result Specimen: Blood from Arm, Left Updated: 03/04/21 0852     Sodium 132 mmol/L      Potassium 2 4 mmol/L      Chloride 88 mmol/L      CO2 19 mmol/L      ANION GAP 25 mmol/L      BUN 12 mg/dL      Creatinine 1 44 mg/dL      Glucose 203 mg/dL      Calcium 8 9 mg/dL      AST 81 U/L      ALT 39 U/L      Alkaline Phosphatase 89 U/L      Total Protein 9 2 g/dL      Albumin 4 4 g/dL      Total Bilirubin 1 64 mg/dL      eGFR 45 ml/min/1 73sq m     Narrative:      [place] guidelines for Chronic Kidney Disease (CKD):     Stage 1 with normal or high GFR (GFR > 90 mL/min/1 73 square meters)    Stage 2 Mild CKD (GFR = 60-89 mL/min/1 73 square meters)    Stage 3A Moderate CKD (GFR = 45-59 mL/min/1 73 square meters)    Stage 3B Moderate CKD (GFR = 30-44 mL/min/1 73 square meters)    Stage 4 Severe CKD (GFR = 15-29 mL/min/1 73 square meters)    Stage 5 End Stage CKD (GFR <15 mL/min/1 73 square meters)  Note: GFR calculation is accurate only with a steady state creatinine    Magnesium [[id number]]  (Abnormal) Collected: 03/04/21 0804    Lab Status: Final result Specimen: Blood from Arm, Left Updated: 03/04/21 0842     Magnesium 1 0 mg/dL     Ethanol [[id number]]  (Normal) Collected: 03/04/21 0804    Lab Status: Final result Specimen: Blood from Arm, Left Updated: 03/04/21 0821     Ethanol Lvl <3 mg/dL     CBC and differential [[id number]]  (Abnormal) Collected: 03/04/21 0804    Lab Status: Final result Specimen: Blood from Arm, Left Updated: 03/04/21 0809     WBC 8 66 Thousand/uL      RBC 4 26 Million/uL      Hemoglobin 15 0 g/dL      Hematocrit 42 2 %      MCV 99 fL      MCH 35 2 pg      MCHC 35 5 g/dL      RDW 13 4 %      MPV 9 8 fL      Platelets 339 Thousands/uL      nRBC 0 /100 WBCs      Neutrophils Relative 53 %      Immat GRANS % 0 %      Lymphocytes Relative 40 %      Monocytes Relative 7 %      Eosinophils Relative 0 %      Basophils Relative 0 %      Neutrophils Absolute 4 49 Thousands/µL      Immature Grans Absolute 0 02 Thousand/uL      Lymphocytes Absolute 3 50 Thousands/µL      Monocytes Absolute 0 60 Thousand/µL      Eosinophils Absolute 0 02 Thousand/µL      Basophils Absolute 0 03 Thousands/µL     UA (URINE) with reflex to Scope [[id number]]     Lab Status: No result Specimen: Urine                  XR chest 1 view portable   ED Interpretation by Lilia Dubon DO (03/04 0849)   Normal appearing                 Procedures  Procedures         ED Course  ED Course as of Mar 04 0932   Thu Mar 04, 2021   0800 EKG 7:58 a m  Sinus tachycardia normal axis intervals T-wave inversion V1 no ST elevation or depression and similar compared to 12/14/2020 interpreted by me      0857 Magnesium(!): 1 0   0857 Potassium(!!): 2 4   0858 Creatinine(!): 1 44   0932 We discussed results, generally improved and agreeable with hospitalization                                SBIRT 20yo+      Most Recent Value   SBIRT (24 yo +)   In order to provide better care to our patients, we are screening all of our patients for alcohol and drug use  Would it be okay to ask you these screening questions?   Yes Filed at: 03/04/2021 7863   Initial Alcohol Screen: US AUDIT-C    1  How often do you have a drink containing alcohol? 6 Filed at: 03/04/2021 0812   2  How many drinks containing alcohol do you have on a typical day you are drinking? 4 Filed at: 03/04/2021 0812   3b  FEMALE Any Age, or MALE 65+: How often do you have 4 or more drinks on one occassion? 6 Filed at: 03/04/2021 4807   Audit-C Score  (!) 16 Filed at: 03/04/2021 5236   Full Alcohol Screen: US AUDIT   4  How often during the last year have you found that you were not able to stop drinking once you had started? 3 Filed at: 03/04/2021 0812   5  How often during past year have you failed to do what was normally expected of you because of drinking? 1 Filed at: 03/04/2021 0812   6  How often in past year have you needed a first drink in the morning to get yourself going after a heavy drinking session? 1 Filed at: 03/04/2021 0812   7  How often in past year have you had feeling of guilt or remorse after drinking? 1 Filed at: 03/04/2021 0812   8  How often in past year have you been unable to remember what happened night before because you had been drinking? 3 Filed at: 03/04/2021 0812   9  Have you or someone else been injured as a result of your drinking? 0 Filed at: 03/04/2021 0812   10  Has a relative, friend, doctor or other health worker been concerned about your drinking and suggested you cut down? 4 Filed at: 03/04/2021 7416   AUDIT Total Score  (!) 29 Filed at: 03/04/2021 8816   ADRIEL: How many times in the past year have you    Used an illegal drug or used a prescription medication for non-medical reasons?   Never Filed at: 03/04/2021 5964                    MDM    Disposition  Final diagnoses:   Alcohol withdrawal ([facility] [address] )   Hypomagnesemia   Hypokalemia   CIRO (acute kidney injury) ([facility] [address] )     Time reflects when diagnosis was documented in both MDM as applicable and the Disposition within this note     Time User Action Codes Description Comment    3/4/2021  8:46 AM Briana Jensen Add [F10 239] Alcohol withdrawal ([address] )     3/4/2021  8:46 AM Briana Jensen Add [E83 42] Hypomagnesemia     3/4/2021  8:51 AM Briana Jensen Add [E87 6] Hypokalemia     3/4/2021  8:58 AM Demetrice Salter Dear Add [N17 9] CIRO (acute kidney injury) [facility])       ED Disposition     ED Disposition Condition Date/Time Comment    Admit Stable Thu Mar 4, 2021  9:31 AM   Case was discussed with Leonie Lui and the patient's admission status was agreed to be Admission Status: observation status to the service of Dr Kevin Young    None         Patient's Medications   Discharge Prescriptions    No medications on file     No discharge procedures on file      PDMP Review     None          ED Provider  Electronically Signed by           Glendy Eaton DO  03/04/21 4528

## 2021-03-04 NOTE — ASSESSMENT & PLAN NOTE
· Patient smokes 1/2 pack of cigarettes a day  · Tobacco cessation counseling given  · Offered patient nicotine patches however states she does not need them

## 2021-03-04 NOTE — ASSESSMENT & PLAN NOTE
· Patient presenting with Creatinine of 1 4 (baseline around 0 7 in 12/2020)  · Likely secondary to volume depletion and vomiting  · Will continue to monitor Creatinine  · Monitor I&Os  · Avoid nephrotoxic agents  · IVF hydration  · Lisinopril held, switched to Amlodipine for now

## 2021-03-04 NOTE — ASSESSMENT & PLAN NOTE
· Likely from chronic alcohol use  · Replete accordingly  · Will maintain on home supplementation and give additional supplementation as needed

## 2021-03-04 NOTE — ED NOTES
This RN asked patient if they wanted warm handoff services at this time  Patient stated they didn't need warm hand off   Pt stated they were only interested in "IV medications because they work quicker "     Jossue Alfonso, [facility]  03/04/21 9297

## 2021-03-05 VITALS
HEIGHT: 59 IN | HEART RATE: 94 BPM | BODY MASS INDEX: 24.44 KG/M2 | SYSTOLIC BLOOD PRESSURE: 139 MMHG | TEMPERATURE: 98 F | RESPIRATION RATE: 20 BRPM | OXYGEN SATURATION: 95 % | DIASTOLIC BLOOD PRESSURE: 103 MMHG | WEIGHT: 121.25 LBS

## 2021-03-05 LAB
ALBUMIN SERPL BCP-MCNC: 3.2 G/DL (ref 3.5–5)
ALP SERPL-CCNC: 67 U/L (ref 46–116)
ALT SERPL W P-5'-P-CCNC: 39 U/L (ref 12–78)
ANION GAP SERPL CALCULATED.3IONS-SCNC: 7 MMOL/L (ref 4–13)
ANION GAP SERPL CALCULATED.3IONS-SCNC: 8 MMOL/L (ref 4–13)
AST SERPL W P-5'-P-CCNC: 91 U/L (ref 5–45)
BILIRUB SERPL-MCNC: 0.88 MG/DL (ref 0.2–1)
BUN SERPL-MCNC: 5 MG/DL (ref 5–25)
BUN SERPL-MCNC: 5 MG/DL (ref 5–25)
CALCIUM ALBUM COR SERPL-MCNC: 8.6 MG/DL (ref 8.3–10.1)
CALCIUM SERPL-MCNC: 8 MG/DL (ref 8.3–10.1)
CALCIUM SERPL-MCNC: 8.8 MG/DL (ref 8.3–10.1)
CHLORIDE SERPL-SCNC: 97 MMOL/L (ref 100–108)
CHLORIDE SERPL-SCNC: 99 MMOL/L (ref 100–108)
CO2 SERPL-SCNC: 27 MMOL/L (ref 21–32)
CO2 SERPL-SCNC: 27 MMOL/L (ref 21–32)
CREAT SERPL-MCNC: 0.6 MG/DL (ref 0.6–1.3)
CREAT SERPL-MCNC: 0.75 MG/DL (ref 0.6–1.3)
GFR SERPL CREATININE-BSD FRML MDRD: 114 ML/MIN/1.73SQ M
GFR SERPL CREATININE-BSD FRML MDRD: 99 ML/MIN/1.73SQ M
GLUCOSE SERPL-MCNC: 103 MG/DL (ref 65–140)
GLUCOSE SERPL-MCNC: 128 MG/DL (ref 65–140)
MAGNESIUM SERPL-MCNC: 1.6 MG/DL (ref 1.6–2.6)
MAGNESIUM SERPL-MCNC: 2.8 MG/DL (ref 1.6–2.6)
POTASSIUM SERPL-SCNC: 3.2 MMOL/L (ref 3.5–5.3)
POTASSIUM SERPL-SCNC: 3.8 MMOL/L (ref 3.5–5.3)
PROT SERPL-MCNC: 6.6 G/DL (ref 6.4–8.2)
SODIUM SERPL-SCNC: 132 MMOL/L (ref 136–145)
SODIUM SERPL-SCNC: 133 MMOL/L (ref 136–145)

## 2021-03-05 PROCEDURE — 80053 COMPREHEN METABOLIC PANEL: CPT | Performed by: STUDENT IN AN ORGANIZED HEALTH CARE EDUCATION/TRAINING PROGRAM

## 2021-03-05 PROCEDURE — 80048 BASIC METABOLIC PNL TOTAL CA: CPT | Performed by: STUDENT IN AN ORGANIZED HEALTH CARE EDUCATION/TRAINING PROGRAM

## 2021-03-05 PROCEDURE — 83735 ASSAY OF MAGNESIUM: CPT | Performed by: STUDENT IN AN ORGANIZED HEALTH CARE EDUCATION/TRAINING PROGRAM

## 2021-03-05 PROCEDURE — 99217 PR OBSERVATION CARE DISCHARGE MANAGEMENT: CPT | Performed by: STUDENT IN AN ORGANIZED HEALTH CARE EDUCATION/TRAINING PROGRAM

## 2021-03-05 RX ORDER — MAGNESIUM SULFATE HEPTAHYDRATE 40 MG/ML
2 INJECTION, SOLUTION INTRAVENOUS ONCE
Status: COMPLETED | OUTPATIENT
Start: 2021-03-05 | End: 2021-03-05

## 2021-03-05 RX ORDER — POTASSIUM CHLORIDE 20 MEQ/1
40 TABLET, EXTENDED RELEASE ORAL ONCE
Status: COMPLETED | OUTPATIENT
Start: 2021-03-05 | End: 2021-03-05

## 2021-03-05 RX ORDER — POTASSIUM CHLORIDE 20 MEQ/1
40 TABLET, EXTENDED RELEASE ORAL DAILY
Status: DISCONTINUED | OUTPATIENT
Start: 2021-03-05 | End: 2021-03-05 | Stop reason: HOSPADM

## 2021-03-05 RX ORDER — AMLODIPINE BESYLATE 10 MG/1
10 TABLET ORAL DAILY
Status: DISCONTINUED | OUTPATIENT
Start: 2021-03-05 | End: 2021-03-05 | Stop reason: HOSPADM

## 2021-03-05 RX ADMIN — Medication 400 MG: at 08:34

## 2021-03-05 RX ADMIN — AMLODIPINE BESYLATE 10 MG: 10 TABLET ORAL at 08:34

## 2021-03-05 RX ADMIN — MULTIPLE VITAMINS W/ MINERALS TAB 1 TABLET: TAB at 08:33

## 2021-03-05 RX ADMIN — MAGNESIUM SULFATE HEPTAHYDRATE 2 G: 40 INJECTION, SOLUTION INTRAVENOUS at 08:34

## 2021-03-05 RX ADMIN — POTASSIUM CHLORIDE 40 MEQ: 1500 TABLET, EXTENDED RELEASE ORAL at 08:33

## 2021-03-05 RX ADMIN — POTASSIUM CHLORIDE 40 MEQ: 1500 TABLET, EXTENDED RELEASE ORAL at 08:34

## 2021-03-05 RX ADMIN — THIAMINE HCL TAB 100 MG 100 MG: 100 TAB at 08:33

## 2021-03-05 RX ADMIN — FOLIC ACID 1 MG: 1 TABLET ORAL at 08:33

## 2021-03-05 RX ADMIN — ENOXAPARIN SODIUM 40 MG: 40 INJECTION SUBCUTANEOUS at 08:33

## 2021-03-05 NOTE — DISCHARGE SUMMARY
Discharge- Beck Pierre 1980, 38 y o  female MRN: [id number]    Unit/Bed#: -01 Encounter: [id number]    Primary Care Provider: Shaye Hanson MD   Date and time admitted to hospital: 3/4/2021  7:54 AM        * Alcohol withdrawal ([facility] [address] )  Assessment & Plan  · Patient presenting with tremors and cramping over extremities associated with nausea and vomiting and inability to tolerate po  · Symptoms likely secondary to alcohol withdrawal  · Patient drinks 1 pint of alcohol a day but had not had a drink in 1-2 days  · Endorses having withdrawal in the past and having alcohol withdrawal related seizures  · CIWA protocol  · Ativan prn  · CIWA at this time - 0  · Thiamine, Folic acid, MVs daily  · Electrolyte repletion  · IVF maintenance    Intractable nausea and vomiting  Assessment & Plan  · Likely secondary to alcohol withdrawal  · Zofran prn  · Maintain on IVF for now  · Diet as tolerated    Acute kidney injury [facility])  Assessment & Plan  · Patient presenting with Creatinine of 1 4 (baseline around 0 7 in 12/2020)  · Likely secondary to volume depletion and vomiting  · Will continue to monitor Creatinine  · Monitor I&Os  · Avoid nephrotoxic agents  · IVF hydration    RESOLVED    Hypokalemia  Assessment & Plan  · Likely from poor po intake  · Monitor and replete  · Maintain on home K supplementation with additional supplementation if needed    RESOLVED    Hypomagnesemia  Assessment & Plan  · Likely from chronic alcohol use  · Replete accordingly  · Will maintain on home supplementation and give additional supplementation as needed    RESOLVED    Essential hypertension  Assessment & Plan  · Initial BP on arrival to ED elevated however repeat BP has been within acceptable limits  · Will continue to monitor  · The patient is on Lisinopril 5 mg daily at home however will switch this to Amlodipine 5 mg for now as patient presenting with CIRO    CIRO RESOLVED   Resume Lisinopril      Transaminitis  Assessment & Plan  · Patient states she had been diagnosed with fatty liver by her PCP  · Continue to monitor LFTs  · Avoid hepatotoxic agents    Tobacco abuse  Assessment & Plan  · Patient smokes 1/2 pack of cigarettes a day  · Tobacco cessation counseling given  · Offered patient nicotine patches however states she does not need them    Anxiety  Assessment & Plan  · Patient on Escitalopram at home - continue          Discharging Physician / Practitioner: Adriana Borrego MD  PCP: Palak Bean MD  Admission Date:   Admission Orders (From admission, onward)     Ordered        03/04/21 0932  Place in Observation  Once                   Discharge Date: 03/05/21    Resolved Problems  Date Reviewed: 7/6/2020    None          Consultations During Hospital Stay:  · none    Procedures Performed:   · none    Significant Findings / Test Results:   XR chest 1 view portable   ED Interpretation by Taz Cuba DO (03/04 0978)   Normal appearing      Final Result by Astrid Roberto MD (03/04 5512)      No acute cardiopulmonary disease  Workstation performed: [id number]         ·   · Hypomagnesemia  · Hypokalemia  · CIRO  · All resolved    Incidental Findings:   · none    Test Results Pending at Discharge (will require follow up):   · none     Outpatient Tests Requested:  · none    Complications:  none    Reason for Admission:  Intractable nausea and vomiting secondary to alcohol withdrawal    Hospital Course:     Ziggy Lee is a 38 y o  female patient who originally presented to the hospital on 3/4/2021 due to alcohol withdrawal   On admission, patient was found to be tremulous, with inability to tolerate p o , with electrolyte abnormalities  Patient was started on IV fluids, maintained on p r n  Ativan, thiamine, folic acid, multivitamins  Electrolyte repletion was ordered as well  Patient is much better today with resolution of her initial symptoms    Denies any chest pain, palpitations, tremors at this time  Electrolytes have been corrected  Education on alcohol and tobacco cessation offered  Patient is currently stable for discharge at this time with outpatient follow-up with her primary care provider  Please see above list of diagnoses and related plan for additional information  Condition at Discharge: stable     Discharge Day Visit / Exam:     Subjective:  Patient seen and examined at bedside  No events overnight  Patient feeling well today and able to tolerate p o  At this time  Denies any chest pain, palpitations, nausea, vomiting, shortness of breath  Vitals: Blood Pressure: (!) 139/103 (03/05/21 1133)  Pulse: 94 (03/05/21 1133)  Temperature: 98 °F (36 7 °C) (03/05/21 1133)  Temp Source: Oral (03/04/21 1435)  Respirations: 20 (03/05/21 1133)  Height: 4' 11" (149 9 cm) (03/04/21 0759)  Weight - Scale: 55 kg (121 lb 4 1 oz) (03/04/21 1438)  SpO2: 95 % (03/05/21 1133)     Exam:   Physical Exam  Vitals signs and nursing note reviewed  Constitutional:       General: She is not in acute distress  Appearance: She is well-developed  HENT:      Head: Normocephalic and atraumatic  Eyes:      Conjunctiva/sclera: Conjunctivae normal    Neck:      Musculoskeletal: Neck supple  Cardiovascular:      Rate and Rhythm: Normal rate and regular rhythm  Heart sounds: No murmur  Pulmonary:      Effort: Pulmonary effort is normal  No respiratory distress  Breath sounds: Normal breath sounds  Abdominal:      Palpations: Abdomen is soft  Tenderness: There is no abdominal tenderness  Skin:     General: Skin is warm and dry  Neurological:      Mental Status: She is alert and oriented to person, place, and time  Discussion with Family:  Patient update her     Discharge instructions/Information to patient and family:   See after visit summary for information provided to patient and family        Provisions for Follow-Up Care:  See after visit summary for information related to follow-up care and any pertinent home health orders  Disposition:     Home    For Discharges to [facility] SNF:   · Not Applicable to this Patient - Not Applicable to this Patient    Planned Readmission:  None     Discharge Statement:  I spent 40 minutes discharging the patient  This time was spent on the day of discharge  I had direct contact with the patient on the day of discharge  Greater than 50% of the total time was spent examining patient, answering all patient questions, arranging and discussing plan of care with patient as well as directly providing post-discharge instructions  Additional time then spent on discharge activities  Discharge Medications:  See after visit summary for reconciled discharge medications provided to patient and family        ** Please Note: This note has been constructed using a voice recognition system **

## 2021-03-05 NOTE — UTILIZATION REVIEW
Initial Clinical Review    Admission: Date/Time/Statement:   Admission Orders (From admission, onward)     Ordered        03/04/21 0932  Place in Observation  Once                   Orders Placed This Encounter   Procedures    Place in Observation     Standing Status:   Standing     Number of Occurrences:   1     Order Specific Question:   Level of Care     Answer:   Med Surg [16]     ED Arrival Information     Expected Arrival Acuity Means of Arrival Escorted By Service Admission Type    - 3/4/2021 07:53 [facility]gent [facility] [address] [address] Hospitalist Urgent    Arrival Complaint    panic attack        Chief Complaint   Patient presents with    Withdrawal - Alcohol     states has not had anything to drink since Tuesday bc she is trying to quit, normally drinks 1 pint of vodka per day, this morning also started with a panic attack     Assessment/Plan: 38year old female to the ED from home via EMS with complaints of upper extremity shakiness, hand cramping, lightheadedness  Stopped drinking alcohol about 2 days prior to arrival   Admitted under observation for alcohol withdrawal, intractable nausea and vomiting, CIRO  She normally drinks a pint of alcohol a day and has h/o ETOH withdrawal seizures  Arrives with mild tongue fasciculations, mild tremors when arms outstretched  Hypertensive on arrival with low mag, potassium, creatinine  CIWA 7  COnitnue with CIWA protocol  IV fluids  GIve Ativan as needed    IV fluids started  ED Triage Vitals   Temperature Pulse Respirations Blood Pressure SpO2   03/04/21 0759 03/04/21 0759 03/04/21 0759 03/04/21 0802 03/04/21 0759   (!) 96 °F (35 6 °C) 97 20 (!) 166/107 98 %      Temp Source Heart Rate Source Patient Position - Orthostatic VS BP Location FiO2 (%)   03/04/21 0759 03/04/21 0759 03/04/21 0759 03/04/21 0759 --   Temporal Monitor Lying Right arm       Pain Score       03/04/21 1120       7          Wt Readings from Last 1 Encounters:   03/04/21 55 kg (121 lb 4 1 oz) Additional Vital Signs:  Date/Time  Temp  Pulse  Resp  BP  MAP (mmHg)  SpO2  O2 Device  Patient Position - Orthostatic VS   03/05/21 07:29:20  98 1 °F (36 7 °C)  79  19  143/103Abnormal   116  93 %  --  --   03/05/21 03:31:39  98 °F (36 7 °C)  75  --  156/113Abnormal   127  98 %  --  --   03/04/21 23:30:49  98 1 °F (36 7 °C)  82  --  150/113Abnormal   125  95 %  --  --   03/04/21 1930  --  88  --  133/93  --  --  --  --   03/04/21 1700  --  --  --  --  --  --  None (Room air)  --   03/04/21 1530  --  69  --  139/90  --  --  --  --   03/04/21 14:35:23  98 7 °F (37 1 °C)  88  18  131/91  104  96 %  None (Room air)  Lying   03/04/21 1132  --  --  --  --  --  95 %  None (Room air)  --   03/04/21 11:28:05  98 °F (36 7 °C)  93  18  136/91  106  96 %  --  --   03/04/21 1100  --  95  --  169/101Abnormal   --  97 %  None (Room air)  Lying   03/04/21 0945  --  86  20  130/78  99  94 %  --  --   03/04/21 0900  --  114Abnormal   30Abnormal   111/71  85  96 %  --  --   03/04/21 0830  --  91  20  128/88  97  95 %  --  Lying   03/04/21 0802  --  --  --  166/107Abnormal   --  --  --  --   03/04/21 0759  96 °F (35 6 °C)Abnormal   97  20  --               Pertinent Labs/Diagnostic Test Results:   3/4 CXR No acute cardiopulmonary disease     Results from last 7 days   Lab Units 03/04/21  0804   SARS-COV-2  Negative     Results from last 7 days   Lab Units 03/04/21  0804   WBC Thousand/uL 8 66   HEMOGLOBIN g/dL 15 0   HEMATOCRIT % 42 2   PLATELETS Thousands/uL 145*   NEUTROS ABS Thousands/µL 4 49         Results from last 7 days   Lab Units 03/05/21  0521 03/04/21  1508 03/04/21  0804   SODIUM mmol/L 133* 133* 132*   POTASSIUM mmol/L 3 2* 3 2* 2 4*   CHLORIDE mmol/L 99* 97* 88*   CO2 mmol/L 27 30 19*   ANION GAP mmol/L 7 6 25*   BUN mg/dL 5 10 12   CREATININE mg/dL 0 60 0 75 1 44*   EGFR ml/min/1 73sq m 114 99 45   CALCIUM mg/dL 8 0* 8 0* 8 9   MAGNESIUM mg/dL 1 6 1 7 1 0*     Results from last 7 days   Lab Units 03/05/21 0521 03/04/21  0804   AST U/L 91* 81*   ALT U/L 39 39   ALK PHOS U/L 67 89   TOTAL PROTEIN g/dL 6 6 9 2*   ALBUMIN g/dL 3 2* 4 4   TOTAL BILIRUBIN mg/dL 0 88 1 64*         Results from last 7 days   Lab Units 03/05/21  0521 03/04/21  1508 03/04/21  0804   GLUCOSE RANDOM mg/dL 103 113 203*       Results from last 7 days   Lab Units 03/04/21  1326   CLARITY UA  Cloudy   COLOR UA  Yellow   SPEC GRAV UA  1 020   PH UA  8 5*   GLUCOSE UA mg/dl Negative   KETONES UA mg/dl Trace*   BLOOD UA  Negative   PROTEIN UA mg/dl Negative   NITRITE UA  Negative   BILIRUBIN UA  Small*   UROBILINOGEN UA E U /dl 4 0*   LEUKOCYTES UA  Trace*   WBC UA /hpf 4-10*   RBC UA /hpf 2-4   BACTERIA UA /hpf Occasional   EPITHELIAL CELLS WET PREP /hpf Occasional     Results from last 7 days   Lab Units 03/04/21  0804   INFLUENZA A PCR  Negative   INFLUENZA B PCR  Negative   RSV PCR  Negative             Results from last 7 days   Lab Units 03/04/21  0804   ETHANOL LVL mg/dL <3       ED Treatment:   Medication Administration from 03/04/2021 0753 to 03/04/2021 1110       Date/Time Order Dose Route Action     03/04/2021 0802 sodium chloride 0 9 % bolus 1,000 mL 1,000 mL Intravenous New Bag     03/04/2021 0803 LORazepam (ATIVAN) injection 2 mg 2 mg Intravenous Given     03/04/2021 0807 ondansetron (ZOFRAN) injection 4 mg 4 mg Intravenous Given     03/04/2021 0856 magnesium sulfate 2 g/50 mL IVPB (premix) 2 g 2 g Intravenous New Bag     03/04/2021 0947 magnesium oxide (MAG-OX) tablet 400 mg 400 mg Oral Given     03/04/2021 0859 potassium chloride (K-DUR,KLOR-CON) CR tablet 40 mEq 40 mEq Oral Given     03/04/2021 0943 potassium chloride 20 mEq IVPB (premix) 20 mEq Intravenous New Bag     03/04/2021 0941 sodium chloride 0 9 % infusion 250 mL/hr Intravenous New Bag        Past Medical History:   Diagnosis Date    Alcohol abuse     Asthma     Hypertension     Seizure ([facility] )     Tubal ligation status        Admitting Diagnosis: Alcohol withdrawal ([facility] ) [F10 239]  Hypokalemia [E87 6]  Hypomagnesemia [E83 42]  CIRO (acute kidney injury) ([facility] [address] ) [N17 9]  Age/Sex: 38 y o  female  Admission Orders:  CIWA  Up and OOB      Scheduled Medications:  amLODIPine, 5 mg, Oral, Daily  enoxaparin, 40 mg, Subcutaneous, Q24H SETH  escitalopram, 20 mg, Oral, HS  folic acid, 1 mg, Oral, Daily  magnesium oxide, 400 mg, Oral, BID  multivitamin-minerals, 1 tablet, Oral, Daily  potassium chloride, 20 mEq, Oral, Daily  thiamine, 100 mg, Oral, Daily      Continuous IV Infusions:  lactated ringers, 100 mL/hr, Intravenous, Continuous  sodium chloride, 250 mL/hr, Intravenous, Continuous      PRN Meds:  LORazepam, 2 mg, Intravenous, Q4H PRN  ondansetron, 4 mg, Intravenous, Q6H PRN          Network Utilization Review Department  ATTENTION: Please call with any questions or concerns to [phone] and carefully listen to the prompts so that you are directed to the right person  All voicemails are confidential   Madhuri Garnett all requests for admission clinical reviews, approved or denied determinations and any other requests to dedicated fax number below belonging to the campus where the patient is receiving treatment   List of dedicated fax numbers for the Facilities:  [address] DENIALS (Administrative/Medical Necessity) [phone]   [address] (Maternity/NICU/Pediatrics) [phone]   [address] Hollis Perrin 3237 (  [address]) [address] [place] [address] [address] [place] [address] [address] [place] [address] [phone]

## 2021-03-05 NOTE — SOCIAL WORK
Cm made follow up appointment for pt with her PCP, Dr Nabil Olmos, on 3/15/21 at [phone]    AVS updated

## 2021-03-05 NOTE — PLAN OF CARE
Problem: Potential for Falls  Goal: Patient will remain free of falls  Description: INTERVENTIONS:  - Assess patient frequently for physical needs  -  Identify cognitive and physical deficits and behaviors that affect risk of falls    -  [place] fall precautions as indicated by assessment   - Educate patient/family on patient safety including physical limitations  - Instruct patient to call for assistance with activity based on assessment  - Modify environment to reduce risk of injury  - Consider OT/PT consult to assist with strengthening/mobility  Outcome: Progressing     Problem: METABOLIC, FLUID AND ELECTROLYTES - ADULT  Goal: Electrolytes maintained within normal limits  Description: INTERVENTIONS:  - Monitor labs and assess patient for signs and symptoms of electrolyte imbalances  - Administer electrolyte replacement as ordered  - Monitor response to electrolyte replacements, including repeat lab results as appropriate  - Instruct patient on fluid and nutrition as appropriate  Outcome: Progressing  Goal: Fluid balance maintained  Description: INTERVENTIONS:  - Monitor labs   - Monitor I/O and WT  - Instruct patient on fluid and nutrition as appropriate  - Assess for signs & symptoms of volume excess or deficit  Outcome: Progressing  Goal: Glucose maintained within target range  Description: INTERVENTIONS:  - Monitor Blood Glucose as ordered  - Assess for signs and symptoms of hyperglycemia and hypoglycemia  - Administer ordered medications to maintain glucose within target range  - Assess nutritional intake and initiate nutrition service referral as needed  Outcome: Progressing     Problem: MUSCULOSKELETAL - ADULT  Goal: Maintain or return mobility to safest level of function  Description: INTERVENTIONS:  - Assess patient's ability to carry out ADLs; assess patient's baseline for ADL function and identify physical deficits which impact ability to perform ADLs (bathing, care of mouth/teeth, toileting, grooming, dressing, etc )  - Assess/evaluate cause of self-care deficits   - Assess range of motion  - Assess patient's mobility  - Assess patient's need for assistive devices and provide as appropriate  - Encourage maximum independence but intervene and supervise when necessary  - Involve family in performance of ADLs  - Assess for home care needs following discharge   - Consider OT consult to assist with ADL evaluation and planning for discharge  - Provide patient education as appropriate  Outcome: Progressing  Goal: Maintain proper alignment of affected body part  Description: INTERVENTIONS:  - Support, maintain and protect limb and body alignment  - Provide patient/ family with appropriate education  Outcome: Progressing     Problem: Nutrition/Hydration-ADULT  Goal: Nutrient/Hydration intake appropriate for improving, restoring or maintaining nutritional needs  Description: Monitor and assess patient's nutrition/hydration status for malnutrition  Collaborate with interdisciplinary team and initiate plan and interventions as ordered  Monitor patient's weight and dietary intake as ordered or per policy  Utilize nutrition screening tool and intervene as necessary  Determine patient's food preferences and provide high-protein, high-caloric foods as appropriate       INTERVENTIONS:  - Monitor oral intake, urinary output, labs, and treatment plans  - Assess nutrition and hydration status and recommend course of action  - Evaluate amount of meals eaten  - Assist patient with eating if necessary   - Allow adequate time for meals  - Recommend/ encourage appropriate diets, oral nutritional supplements, and vitamin/mineral supplements  - Order, calculate, and assess calorie counts as needed  - Recommend, monitor, and adjust tube feedings and TPN/PPN based on assessed needs  - Assess need for intravenous fluids  - Provide specific nutrition/hydration education as appropriate  - Include patient/family/caregiver in decisions related to nutrition  Outcome: Progressing     Problem: PAIN - ADULT  Goal: Verbalizes/displays adequate comfort level or baseline comfort level  Description: Interventions:  - Encourage patient to monitor pain and request assistance  - Assess pain using appropriate pain scale  - Administer analgesics based on type and severity of pain and evaluate response  - Implement non-pharmacological measures as appropriate and evaluate response  - Consider cultural and social influences on pain and pain management  - Notify physician/advanced practitioner if interventions unsuccessful or patient reports new pain  Outcome: Progressing     Problem: SAFETY ADULT  Goal: Patient will remain free of falls  Description: INTERVENTIONS:  - Assess patient frequently for physical needs  -  Identify cognitive and physical deficits and behaviors that affect risk of falls    -  [place] fall precautions as indicated by assessment   - Educate patient/family on patient safety including physical limitations  - Instruct patient to call for assistance with activity based on assessment  - Modify environment to reduce risk of injury  - Consider OT/PT consult to assist with strengthening/mobility  Outcome: Progressing  Goal: Maintain or return to baseline ADL function  Description: INTERVENTIONS:  -  Assess patient's ability to carry out ADLs; assess patient's baseline for ADL function and identify physical deficits which impact ability to perform ADLs (bathing, care of mouth/teeth, toileting, grooming, dressing, etc )  - Assess/evaluate cause of self-care deficits   - Assess range of motion  - Assess patient's mobility; develop plan if impaired  - Assess patient's need for assistive devices and provide as appropriate  - Encourage maximum independence but intervene and supervise when necessary  - Involve family in performance of ADLs  - Assess for home care needs following discharge   - Consider OT consult to assist with ADL evaluation and planning for discharge  - Provide patient education as appropriate  Outcome: Progressing  Goal: Maintain or return mobility status to optimal level  Description: INTERVENTIONS:  - Assess patient's baseline mobility status (ambulation, transfers, stairs, etc )    - Identify cognitive and physical deficits and behaviors that affect mobility  - Identify mobility aids required to assist with transfers and/or ambulation (gait belt, sit-to-stand, lift, walker, cane, etc )  - Baker fall precautions as indicated by assessment  - Record patient progress and toleration of activity level on Mobility SBAR; progress patient to next Phase/Stage  - Instruct patient to call for assistance with activity based on assessment  - Consider rehabilitation consult to assist with strengthening/weightbearing, etc   Outcome: Progressing     Problem: DISCHARGE PLANNING  Goal: Discharge to home or other facility with appropriate resources  Description: INTERVENTIONS:  - Identify barriers to discharge w/patient and caregiver  - Arrange for needed discharge resources and transportation as appropriate  - Identify discharge learning needs (meds, wound care, etc )  - Arrange for interpretive services to assist at discharge as needed  - Refer to Case Management Department for coordinating discharge planning if the patient needs post-hospital services based on physician/advanced practitioner order or complex needs related to functional status, cognitive ability, or social support system  Outcome: Progressing     Problem: Knowledge Deficit  Goal: Patient/family/caregiver demonstrates understanding of disease process, treatment plan, medications, and discharge instructions  Description: Complete learning assessment and assess knowledge base    Interventions:  - Provide teaching at level of understanding  - Provide teaching via preferred learning methods  Outcome: Progressing

## 2021-03-05 NOTE — DISCHARGE INSTRUCTIONS
Abuse of Alcohol   WHAT YOU NEED TO KNOW:   Alcohol abuse means you drink more than the recommended daily or weekly limits  You may be drinking alcohol regularly or drinking large amounts in a short period of time (binge drinking)  You continue to drink even though it causes legal, work, or relationship problems  DISCHARGE INSTRUCTIONS:   Call your local emergency number (911 in the [address]) for any of the following:   · You have sudden chest pain or trouble breathing  · You want to harm yourself or others  · You have a seizure or have shaking or trembling  Call your doctor if:   · You have hallucinations (you see or hear things that are not real)  · You cannot stop vomiting or you vomit blood  · You need help to stop drinking alcohol  · You have questions or concerns about your condition or care  Medicines:   · Vitamin supplements  may be given to treat low vitamin levels  Alcohol can make it hard for your body to absorb enough vitamins such as B1  Vitamin supplements may also be given to prevent alcohol related brain damage  · Take your medicine as directed  Contact your healthcare provider if you think your medicine is not helping or if you have side effects  Tell him or her if you are allergic to any medicine  Keep a list of the medicines, vitamins, and herbs you take  Include the amounts, and when and why you take them  Bring the list or the pill bottles to follow-up visits  Carry your medicine list with you in case of an emergency      Health problems alcohol abuse can cause:   · Cancer in your liver, pancreas, stomach, colon, kidney, or breast    · Stroke or a heart attack    · Liver, kidney, or lung disease    · Blackouts, memory loss, brain damage, or dementia    · Diabetes, immune system problems, or thiamine (vitamin B1) deficiency    · Problems for you and your baby if you drink while pregnant    Recommended alcohol limits:   · Men 21 to 64 years  should limit alcohol to 2 drinks a day  Do not have more than 4 drinks in 1 day or more than 14 in 1 week  · All women, and men 72 or older  should limit alcohol to 1 drink in a day  Do not have more than 3 drinks in 1 day or more than 7 in 1 week  No amount of alcohol is okay during pregnancy  Manage alcohol use:   · Decrease the amount you drink  This can help prevent health problems such as brain, heart, and liver damage, high blood pressure, diabetes, and cancer  If you cannot stop completely, healthcare providers can help you set goals to decrease the amount you drink  · Plan weekly alcohol use  You will be less likely to drink more than the recommended limit if you plan ahead  · Have food when you drink alcohol  Food will prevent alcohol from getting into your system too quickly  Eat before you have your first alcohol drink  · Time your drinks carefully  Have no more than 1 drink in an hour  Have a liquid such as water, coffee, or a soft drink between alcohol drinks  · Do not drive if you have had alcohol  Make sure someone who has not been drinking can help you get home  · Do not drink alcohol if you are taking medicine  Alcohol is dangerous when you combine it with certain medicines, such as acetaminophen or blood pressure medicine  Talk to your healthcare provider about all the medicines you currently take  Follow up with your healthcare provider as directed:  Write down your questions so you remember to ask them during your visits  For support and more information:   · Alcoholics Anonymous  Web Address: http://www grissom info/    · Substance Abuse and [address] , [address] [place]  Web Address: https://[employer]/  © [address] Information is for End User's use only and may not be sold, redistributed or otherwise used for commercial purposes   All illustrations and images included in CareNotes® are the copyrighted property of [employer]  or [address] [place] [address] Shellie   The above information is an  only  It is not intended as medical advice for individual conditions or treatments  Talk to your doctor, nurse or pharmacist before following any medical regimen to see if it is safe and effective for you

## 2021-03-05 NOTE — ASSESSMENT & PLAN NOTE
· Initial BP on arrival to ED elevated however repeat BP has been within acceptable limits  · Will continue to monitor  · The patient is on Lisinopril 5 mg daily at home however will switch this to Amlodipine 5 mg for now as patient presenting with CIRO    CIRO RESOLVED   Resume Lisinopril

## 2021-03-05 NOTE — ASSESSMENT & PLAN NOTE
· Likely from chronic alcohol use  · Replete accordingly  · Will maintain on home supplementation and give additional supplementation as needed    RESOLVED

## 2021-03-05 NOTE — ASSESSMENT & PLAN NOTE
· Patient presenting with Creatinine of 1 4 (baseline around 0 7 in 12/2020)  · Likely secondary to volume depletion and vomiting  · Will continue to monitor Creatinine  · Monitor I&Os  · Avoid nephrotoxic agents  · IVF hydration    RESOLVED

## 2021-03-05 NOTE — ASSESSMENT & PLAN NOTE
· Likely from poor po intake  · Monitor and replete  · Maintain on home K supplementation with additional supplementation if needed    RESOLVED

## 2021-03-05 NOTE — ASSESSMENT & PLAN NOTE
· Patient presenting with tremors and cramping over extremities associated with nausea and vomiting and inability to tolerate po  · Symptoms likely secondary to alcohol withdrawal  · Patient drinks 1 pint of alcohol a day but had not had a drink in 1-2 days  · Endorses having withdrawal in the past and having alcohol withdrawal related seizures  · CIWA protocol  · Ativan prn  · CIWA at this time - 0  · Thiamine, Folic acid, MVs daily  · Electrolyte repletion  · IVF maintenance

## 2021-03-07 LAB
ATRIAL RATE: 102 BPM
P AXIS: 63 DEGREES
PR INTERVAL: 140 MS
QRS AXIS: 68 DEGREES
QRSD INTERVAL: 88 MS
QT INTERVAL: 382 MS
QTC INTERVAL: 497 MS
T WAVE AXIS: 38 DEGREES
VENTRICULAR RATE: 102 BPM

## 2021-03-07 PROCEDURE — 93010 ELECTROCARDIOGRAM REPORT: CPT | Performed by: INTERNAL MEDICINE

## 2021-03-08 NOTE — UTILIZATION REVIEW
Notification of Discharge  This is a Notification of Discharge from our facility [address]  Please be advised that this patient has been discharge from our facility  Below you will find the admission and discharge date and time including the patients disposition  PRESENTATION DATE: 3/4/2021  7:54 AM  OBS ADMISSION DATE:   IP ADMISSION DATE: N/A N/A   DISCHARGE DATE: 3/5/2021  1:58 PM  DISPOSITION: Home/Self Care Home/Self Care   Admission Orders listed below:  Admission Orders (From admission, onward)     Ordered        03/04/21 0932  Place in Observation  Once                   Please contact the UR Department if additional information is required to close this patient's authorization/case  [address] [employer] Utilization Review Department  Main: [phone] x carefully listen to the prompts  All voicemails are confidential   [email or web address]  Send all requests for admission clinical reviews, approved or denied determinations and any other requests to dedicated fax number below belonging to the campus where the patient is receiving treatment   List of dedicated fax numbers:  [address] DENIALS (Administrative/Medical Necessity) [phone]   [address] (Maternity/NICU/Pediatrics) [phone] 5400 [facility] [phone]   [facility] [phone]   Christa Moore [phone]   [facility] 15[address] [facility] [phone]   [address] [facility] [phone]   [address] [facility]  2401 [facility] 1000 [address] [facility] [phone]

## 2021-04-23 ENCOUNTER — APPOINTMENT (EMERGENCY)
Dept: RADIOLOGY | Facility: HOSPITAL | Age: 41
End: 2021-04-23
Payer: COMMERCIAL

## 2021-04-23 ENCOUNTER — HOSPITAL ENCOUNTER (EMERGENCY)
Facility: HOSPITAL | Age: 41
Discharge: HOME | End: 2021-04-23
Attending: EMERGENCY MEDICINE | Admitting: EMERGENCY MEDICINE
Payer: COMMERCIAL

## 2021-04-23 VITALS
DIASTOLIC BLOOD PRESSURE: 78 MMHG | SYSTOLIC BLOOD PRESSURE: 143 MMHG | TEMPERATURE: 97.2 F | BODY MASS INDEX: 22.98 KG/M2 | OXYGEN SATURATION: 99 % | HEART RATE: 99 BPM | RESPIRATION RATE: 14 BRPM | WEIGHT: 113.76 LBS

## 2021-04-23 DIAGNOSIS — J44.1 COPD EXACERBATION (HCC): Primary | ICD-10-CM

## 2021-04-23 LAB
ANION GAP SERPL CALCULATED.3IONS-SCNC: 14 MMOL/L (ref 4–13)
BASOPHILS # BLD AUTO: 0.03 THOUSANDS/ΜL (ref 0–0.1)
BASOPHILS NFR BLD AUTO: 0 % (ref 0–1)
BUN SERPL-MCNC: 8 MG/DL (ref 5–25)
CALCIUM SERPL-MCNC: 8.2 MG/DL (ref 8.3–10.1)
CHLORIDE SERPL-SCNC: 102 MMOL/L (ref 100–108)
CO2 SERPL-SCNC: 26 MMOL/L (ref 21–32)
CREAT SERPL-MCNC: 0.9 MG/DL (ref 0.6–1.3)
EOSINOPHIL # BLD AUTO: 0.01 THOUSAND/ΜL (ref 0–0.61)
EOSINOPHIL NFR BLD AUTO: 0 % (ref 0–6)
ERYTHROCYTE [DISTWIDTH] IN BLOOD BY AUTOMATED COUNT: 13.1 % (ref 11.6–15.1)
GFR SERPL CREATININE-BSD FRML MDRD: 80 ML/MIN/1.73SQ M
GLUCOSE SERPL-MCNC: 79 MG/DL (ref 65–140)
HCT VFR BLD AUTO: 40.9 % (ref 34.8–46.1)
HGB BLD-MCNC: 13.8 G/DL (ref 11.5–15.4)
IMM GRANULOCYTES # BLD AUTO: 0.02 THOUSAND/UL (ref 0–0.2)
IMM GRANULOCYTES NFR BLD AUTO: 0 % (ref 0–2)
LYMPHOCYTES # BLD AUTO: 3.48 THOUSANDS/ΜL (ref 0.6–4.47)
LYMPHOCYTES NFR BLD AUTO: 41 % (ref 14–44)
MCH RBC QN AUTO: 34.8 PG (ref 26.8–34.3)
MCHC RBC AUTO-ENTMCNC: 33.7 G/DL (ref 31.4–37.4)
MCV RBC AUTO: 103 FL (ref 82–98)
MONOCYTES # BLD AUTO: 0.39 THOUSAND/ΜL (ref 0.17–1.22)
MONOCYTES NFR BLD AUTO: 5 % (ref 4–12)
NEUTROPHILS # BLD AUTO: 4.53 THOUSANDS/ΜL (ref 1.85–7.62)
NEUTS SEG NFR BLD AUTO: 54 % (ref 43–75)
NRBC BLD AUTO-RTO: 0 /100 WBCS
NT-PROBNP SERPL-MCNC: 9 PG/ML
PLATELET # BLD AUTO: 146 THOUSANDS/UL (ref 149–390)
PMV BLD AUTO: 9.3 FL (ref 8.9–12.7)
POTASSIUM SERPL-SCNC: 3.5 MMOL/L (ref 3.5–5.3)
RBC # BLD AUTO: 3.97 MILLION/UL (ref 3.81–5.12)
SARS-COV-2 RNA RESP QL NAA+PROBE: NEGATIVE
SODIUM SERPL-SCNC: 142 MMOL/L (ref 136–145)
TROPONIN I SERPL-MCNC: <0.02 NG/ML
WBC # BLD AUTO: 8.46 THOUSAND/UL (ref 4.31–10.16)

## 2021-04-23 PROCEDURE — 84484 ASSAY OF TROPONIN QUANT: CPT | Performed by: PHYSICIAN ASSISTANT

## 2021-04-23 PROCEDURE — U0005 INFEC AGEN DETEC AMPLI PROBE: HCPCS | Performed by: PHYSICIAN ASSISTANT

## 2021-04-23 PROCEDURE — 99285 EMERGENCY DEPT VISIT HI MDM: CPT

## 2021-04-23 PROCEDURE — 36415 COLL VENOUS BLD VENIPUNCTURE: CPT | Performed by: PHYSICIAN ASSISTANT

## 2021-04-23 PROCEDURE — 83880 ASSAY OF NATRIURETIC PEPTIDE: CPT | Performed by: PHYSICIAN ASSISTANT

## 2021-04-23 PROCEDURE — 94760 N-INVAS EAR/PLS OXIMETRY 1: CPT

## 2021-04-23 PROCEDURE — U0003 INFECTIOUS AGENT DETECTION BY NUCLEIC ACID (DNA OR RNA); SEVERE ACUTE RESPIRATORY SYNDROME CORONAVIRUS 2 (SARS-COV-2) (CORONAVIRUS DISEASE [COVID-19]), AMPLIFIED PROBE TECHNIQUE, MAKING USE OF HIGH THROUGHPUT TECHNOLOGIES AS DESCRIBED BY CMS-2020-01-R: HCPCS | Performed by: PHYSICIAN ASSISTANT

## 2021-04-23 PROCEDURE — 93005 ELECTROCARDIOGRAM TRACING: CPT

## 2021-04-23 PROCEDURE — 85025 COMPLETE CBC W/AUTO DIFF WBC: CPT | Performed by: PHYSICIAN ASSISTANT

## 2021-04-23 PROCEDURE — 80048 BASIC METABOLIC PNL TOTAL CA: CPT | Performed by: PHYSICIAN ASSISTANT

## 2021-04-23 PROCEDURE — 94644 CONT INHLJ TX 1ST HOUR: CPT

## 2021-04-23 PROCEDURE — 99285 EMERGENCY DEPT VISIT HI MDM: CPT | Performed by: PHYSICIAN ASSISTANT

## 2021-04-23 PROCEDURE — 71045 X-RAY EXAM CHEST 1 VIEW: CPT

## 2021-04-23 RX ORDER — FLUCONAZOLE 150 MG/1
150 TABLET ORAL ONCE
Qty: 1 TABLET | Refills: 0 | Status: SHIPPED | OUTPATIENT
Start: 2021-04-23 | End: 2021-04-23

## 2021-04-23 RX ORDER — METHYLPREDNISOLONE SOD SUCC 125 MG
1 VIAL (EA) INJECTION ONCE
Status: COMPLETED | OUTPATIENT
Start: 2021-04-23 | End: 2021-04-23

## 2021-04-23 RX ORDER — ALBUTEROL SULFATE 2.5 MG/3ML
1 SOLUTION RESPIRATORY (INHALATION) ONCE
Status: COMPLETED | OUTPATIENT
Start: 2021-04-23 | End: 2021-04-23

## 2021-04-23 RX ORDER — SODIUM CHLORIDE FOR INHALATION 0.9 %
3 VIAL, NEBULIZER (ML) INHALATION ONCE
Status: COMPLETED | OUTPATIENT
Start: 2021-04-23 | End: 2021-04-23

## 2021-04-23 RX ADMIN — ALBUTEROL SULFATE 5 MG: 2.5 SOLUTION RESPIRATORY (INHALATION) at 13:17

## 2021-04-23 RX ADMIN — IPRATROPIUM BROMIDE 0.5 MG: 0.5 SOLUTION RESPIRATORY (INHALATION) at 13:17

## 2021-04-23 RX ADMIN — ISODIUM CHLORIDE 3 ML: 0.03 SOLUTION RESPIRATORY (INHALATION) at 13:17

## 2021-04-23 NOTE — DISCHARGE INSTRUCTIONS
If you have any new or worsening symptoms please return to the ED  I sent a diflucan to your pharmacy to help avoid a yeast infection  Please  the antibiotics previously sent to the pharmacy for you     Please continue to use nebulizer at home as needed

## 2021-04-23 NOTE — ED PROVIDER NOTES
History  Chief Complaint   Patient presents with    Shortness of Breath     patient seen at PCP on tuesday, negative for covid, was given ABX and she has not taken them because of getting a yeast infection  50-year-old female presents to the emergency department for evaluation of shortness of breath  Patient reports she has smokes 1/2 pack per day for 31 years  Reports she uses nebulizers at home which have not been improving symptoms  States she saw her PCP on Tuesday  Was tested negative for COVID and given prescription of antibiotics  States she has been unable to pick this up because antibiotics typically give her a yeast infection  Patient reports an everyday cough  States cough is nonproductive  She denies chest pain, palpations, leg swelling  She denies any abdominal pain, nausea, vomiting, diarrhea  Patient denies fevers or chills or rash  Patient reports Solu-Medrol and albuterol treatment via EMS  States she feels jittery  History provided by:  Patient  Shortness of Breath  Severity:  Moderate  Onset quality:  Gradual  Timing:  Constant  Progression:  Unchanged  Chronicity:  Recurrent  Ineffective treatments:  Inhaler  Associated symptoms: cough and wheezing    Associated symptoms: no abdominal pain, no chest pain, no claudication, no diaphoresis, no ear pain, no fever, no headaches, no hemoptysis, no neck pain, no PND, no rash, no sore throat, no sputum production, no syncope, no swollen glands and no vomiting        Prior to Admission Medications   Prescriptions Last Dose Informant Patient Reported?  Taking?   escitalopram (LEXAPRO) 20 mg tablet   Yes No   Sig: Take 20 mg by mouth daily at bedtime    folic acid (FOLVITE) 1 mg tablet   No No   Sig: Take 1 tablet (1 mg total) by mouth daily   lisinopril (ZESTRIL) 5 mg tablet   Yes No   Sig: Take 5 mg by mouth daily   magnesium 30 MG tablet   Yes No   Sig: Take 30 mg by mouth daily Unknown dosage    potassium chloride (K-DUR,KLOR-CON) 20 mEq tablet   Yes No   Sig: Take 20 mEq by mouth daily      Facility-Administered Medications: None       Past Medical History:   Diagnosis Date    Alcohol abuse     Asthma     Hypertension     Seizure ([address] )     Tubal ligation status        Past Surgical History:   Procedure Laterality Date    ANKLE SURGERY      BREAST BIOPSY      SALPINGOOPHORECTOMY      TUBAL LIGATION         Family History   Problem Relation Age of Onset    Hypertension Father      I have reviewed and agree with the history as documented  E-Cigarette/Vaping    E-Cigarette Use Current Every Day User      E-Cigarette/Vaping Substances    THC Yes      Social History     Tobacco Use    Smoking status: Current Every Day Smoker     Packs/day: 0 50     Types: Cigarettes    Smokeless tobacco: Never Used   Substance Use Topics    Alcohol use: Yes     Frequency: 4 or more times a week     Drinks per session: 10 or more     Binge frequency: Daily or almost daily     Comment: One pint of vodka per day    Drug use: Yes     Types: Marijuana       Review of Systems   Constitutional: Negative  Negative for appetite change, chills, diaphoresis, fatigue and fever  HENT: Negative  Negative for ear pain and sore throat  Eyes: Negative  Negative for visual disturbance  Respiratory: Positive for cough, shortness of breath and wheezing  Negative for hemoptysis, sputum production, choking, chest tightness and stridor  Cardiovascular: Negative  Negative for chest pain, palpitations, claudication, leg swelling, syncope and PND  Gastrointestinal: Negative  Negative for abdominal pain and vomiting  Genitourinary: Negative  Musculoskeletal: Negative  Negative for neck pain  Skin: Negative  Negative for rash  Neurological: Negative  Negative for headaches  All other systems reviewed and are negative  Physical Exam  Physical Exam  Vitals signs and nursing note reviewed     Constitutional:       General: She is not in acute distress  Appearance: She is well-developed and normal weight  She is not ill-appearing, toxic-appearing or diaphoretic  HENT:      Head: Normocephalic and atraumatic  Mouth/Throat:      Mouth: Mucous membranes are moist       Pharynx: Oropharynx is clear  No pharyngeal swelling or oropharyngeal exudate  Eyes:      Extraocular Movements: Extraocular movements intact  Pupils: Pupils are equal, round, and reactive to light  Neck:      Musculoskeletal: Normal range of motion and neck supple  Cardiovascular:      Rate and Rhythm: Normal rate and regular rhythm  Pulses:           Radial pulses are 2+ on the right side and 2+ on the left side  Dorsalis pedis pulses are 2+ on the right side and 2+ on the left side  Posterior tibial pulses are 2+ on the right side and 2+ on the left side  Pulmonary:      Effort: Tachypnea present  No bradypnea or accessory muscle usage  Breath sounds: Decreased breath sounds, wheezing and rhonchi present  Chest:      Chest wall: No mass, deformity or tenderness  Abdominal:      General: Bowel sounds are normal       Palpations: Abdomen is soft  There is no mass  Tenderness: There is no abdominal tenderness  Musculoskeletal: Normal range of motion  Right lower leg: She exhibits no tenderness  No edema  Left lower leg: She exhibits no tenderness  No edema  Lymphadenopathy:      Cervical: No cervical adenopathy  Skin:     General: Skin is warm and dry  Capillary Refill: Capillary refill takes less than 2 seconds  Coloration: Skin is not pale  Findings: No ecchymosis or erythema  Nails: There is no clubbing  Neurological:      General: No focal deficit present  Mental Status: She is alert and oriented to person, place, and time     Psychiatric:         Mood and Affect: Mood normal          Behavior: Behavior normal          Vital Signs  ED Triage Vitals [04/23/21 1252]   Temperature Pulse Respirations Blood Pressure SpO2   (!) 97 2 °F (36 2 °C) (!) 106 20 123/93 98 %      Temp Source Heart Rate Source Patient Position - Orthostatic VS BP Location FiO2 (%)   Temporal Monitor Lying Right arm --      Pain Score       --           Vitals:    04/23/21 1252 04/23/21 1330 04/23/21 1400 04/23/21 1430   BP: 123/93 130/77 133/64 143/78   Pulse: (!) 106 97 (!) 106 99   Patient Position - Orthostatic VS: Lying            Visual Acuity      ED Medications  Medications   albuterol (FOR EMS ONLY) (2 5 mg/3 mL) 0 083 % inhalation solution 2 5 mg (0 mg Does not apply Given to EMS 4/23/21 1303)   methylPREDNISolone sodium succinate (FOR EMS ONLY) (Solu-MEDROL) 125 MG injection 125 mg (0 mg Does not apply Given to EMS 4/23/21 1303)   albuterol inhalation solution 5 mg (5 mg Nebulization Given 4/23/21 1317)     And   ipratropium (ATROVENT) 0 02 % inhalation solution 0 5 mg (0 5 mg Nebulization Given 4/23/21 1317)     And   sodium chloride 0 9 % inhalation solution 3 mL (3 mL Nebulization Given 4/23/21 1317)       Diagnostic Studies  Results Reviewed     Procedure Component Value Units Date/Time    Novel Coronavirus [facility] [[id number]]  (Normal) Collected: 04/23/21 1303    Lab Status: Final result Specimen: Nares from Nose Updated: 04/23/21 1410     SARS-CoV-2 Negative    Narrative: The specimen collection materials, transport medium, and/or testing methodology utilized in the production of these test results have been proven to be reliable in a limited validation with an abbreviated program under the Emergency Utilization Authorization provided by the FDA  Testing reported as "Presumptive positive" will be confirmed with secondary testing to ensure result accuracy  Clinical caution and judgement should be used with the interpretation of these results with consideration of the clinical impression and other laboratory testing    Testing reported as "Positive" or "Negative" has been proven to be accurate according to standard laboratory validation requirements  All testing is performed with control materials showing appropriate reactivity at standard intervals        Basic metabolic panel [[id number]]  (Abnormal) Collected: 04/23/21 1303    Lab Status: Final result Specimen: Blood from Arm, Left Updated: 04/23/21 1333     Sodium 142 mmol/L      Potassium 3 5 mmol/L      Chloride 102 mmol/L      CO2 26 mmol/L      ANION GAP 14 mmol/L      BUN 8 mg/dL      Creatinine 0 90 mg/dL      Glucose 79 mg/dL      Calcium 8 2 mg/dL      eGFR 80 ml/min/1 73sq m     Narrative:      National Kidney Disease Foundation guidelines for Chronic Kidney Disease (CKD):     Stage 1 with normal or high GFR (GFR > 90 mL/min/1 73 square meters)    Stage 2 Mild CKD (GFR = 60-89 mL/min/1 73 square meters)    Stage 3A Moderate CKD (GFR = 45-59 mL/min/1 73 square meters)    Stage 3B Moderate CKD (GFR = 30-44 mL/min/1 73 square meters)    Stage 4 Severe CKD (GFR = 15-29 mL/min/1 73 square meters)    Stage 5 End Stage CKD (GFR <15 mL/min/1 73 square meters)  Note: GFR calculation is accurate only with a steady state creatinine    NT-BNP PRO [[id number]]  (Normal) Collected: 04/23/21 1303    Lab Status: Final result Specimen: Blood from Arm, Left Updated: 04/23/21 1333     NT-proBNP 9 pg/mL     Troponin I [[id number]]  (Normal) Collected: 04/23/21 1303    Lab Status: Final result Specimen: Blood from Arm, Left Updated: 04/23/21 1330     Troponin I <0 02 ng/mL     CBC and differential [[id number]]  (Abnormal) Collected: 04/23/21 1303    Lab Status: Final result Specimen: Blood from Arm, Left Updated: 04/23/21 1312     WBC 8 46 Thousand/uL      RBC 3 97 Million/uL      Hemoglobin 13 8 g/dL      Hematocrit 40 9 %       fL      MCH 34 8 pg      MCHC 33 7 g/dL      RDW 13 1 %      MPV 9 3 fL      Platelets 565 Thousands/uL      nRBC 0 /100 WBCs      Neutrophils Relative 54 %      Immat GRANS % 0 %      Lymphocytes Relative 41 %      Monocytes Relative 5 %      Eosinophils Relative 0 %      Basophils Relative 0 %      Neutrophils Absolute 4 53 Thousands/µL      Immature Grans Absolute 0 02 Thousand/uL      Lymphocytes Absolute 3 48 Thousands/µL      Monocytes Absolute 0 39 Thousand/µL      Eosinophils Absolute 0 01 Thousand/µL      Basophils Absolute 0 03 Thousands/µL                  XR chest 1 view portable   ED Interpretation by Jennifer Juarez PA-C (04/23 3583)   No acute cardiopulmonary findings      Final Result by Cynthia Kelley MD (04/23 0947)      No acute cardiopulmonary disease  Workstation performed: [id number]                    Procedures  ECG 12 Lead Documentation Only    Date/Time: 4/23/2021 12:58 PM  Performed by: Jennifer Juarez PA-C  Authorized by: Jennifer Juarez PA-C     Indications / Diagnosis:  SOB  ECG reviewed by me, the ED Provider: yes    Patient location:  ED  Interpretation:     Interpretation: non-specific    Rate:     ECG rate:  95    ECG rate assessment: normal    Rhythm:     Rhythm: sinus rhythm    Ectopy:     Ectopy: none    QRS:     QRS axis:  Normal    QRS intervals:  Normal  Conduction:     Conduction: normal    ST segments:     ST segments:  Normal  T waves:     T waves: normal               ED Course  ED Course as of Apr 23 1540   Fri Apr 23, 2021   1410 Patient feeling somewhat improved at this time  States she still has difficulty taking a deep breath  Notes she continues to feel jittery  1418 SARS-COV-2: Negative   1439 Patient feeling significantly improved at this time  We discussed all results and findings at this time  Patient resting comfortably without complaint  Patient requesting medication to help prevent yeast infection so she can  antibiotic prescribed by PCP  Will send Diflucan  We discussed use of nebulizer at home    Patient agreed to treatment plan she remained well ED and was discharged                    MDM  Number of Diagnoses or Management Options  COPD exacerbation [facility]): new and requires workup  Diagnosis management comments: 38year old female presented to the ED for evaluation of shortness of breath  Vitals and medical record reviewed  On exam patient was tachypneic with decreased breath sounds noted rales and wheezing bilaterally  She had Solu-Medrol and albuterol treatment via EMS  Normal oxygen saturation on room air  Laboratory findings relatively unremarkable  COVID negative  EKG was nonischemic  ED interpretation of chest x-ray negative for acute cardiopulmonary findings  Patient treated with hour long nebulizer and had significant improvement of symptoms  Lung sounds improved  No longer tachypneic  Discussed results and findings with the patient  Patient refuses p o  Steroids as she says these make her feel jittery  She has prescription for antibiotics prescribed by PCP requesting Diflucan to avoid yeast infection  Prescription sent to pharmacy  Patient follow-up with PCP  Patient was educated on symptoms that require prompt return to the emergency department for further evaluation and verbalized understanding  Amount and/or Complexity of Data Reviewed  Clinical lab tests: ordered and reviewed  Tests in the radiology section of CPT®: ordered and reviewed  Review and summarize past medical records: yes  Independent visualization of images, tracings, or specimens: yes        Disposition  Final diagnoses:   COPD exacerbation ([facility] [address] )     Time reflects when diagnosis was documented in both MDM as applicable and the Disposition within this note     Time User Action Codes Description Comment    4/23/2021  2:41 PM Manuela العراقي Add [J44 1] COPD exacerbation [facility])       ED Disposition     ED Disposition Condition Date/Time Comment    Discharge Stable Fri Apr 23, 2021  2:41 PM Gino Pool discharge to home/self care              Follow-up Information     Follow up With Specialties Details Why Sameera Hickman MD Internal Medicine, Cardiology In 1 week As needed, If symptoms worsen [address] [place] [address]  [phone]            Discharge Medication List as of 4/23/2021  2:44 PM      START taking these medications    Details   fluconazole (DIFLUCAN) 150 mg tablet Take 1 tablet (150 mg total) by mouth once for 1 dose, Starting Fri 4/23/2021, Normal         CONTINUE these medications which have NOT CHANGED    Details   escitalopram (LEXAPRO) 20 mg tablet Take 20 mg by mouth daily at bedtime , Starting Thu 2/4/2021, Until Fri 2/4/2022, Historical Med      folic acid (FOLVITE) 1 mg tablet Take 1 tablet (1 mg total) by mouth daily, Starting Tue 7/7/2020, Normal      lisinopril (ZESTRIL) 5 mg tablet Take 5 mg by mouth daily, Starting Tue 11/26/2019, Historical Med      magnesium 30 MG tablet Take 30 mg by mouth daily Unknown dosage , Historical Med      potassium chloride (K-DUR,KLOR-CON) 20 mEq tablet Take 20 mEq by mouth daily, Starting Tue 12/22/2020, Historical Med           No discharge procedures on file      PDMP Review     None          ED Provider  Electronically Signed by           Leanne Garcia PA-C  04/23/21 8198

## 2021-04-24 NOTE — ED ATTENDING ATTESTATION
4/23/2021  Deana Shaw DO, saw and evaluated the patient  I have discussed the patient with the resident/non-physician practitioner and agree with the resident's/non-physician practitioner's findings, Plan of Care, and MDM as documented in the resident's/non-physician practitioner's note, except where noted  All available labs and Radiology studies were reviewed  I was present for key portions of any procedure(s) performed by the resident/non-physician practitioner and I was immediately available to provide assistance  At this point I agree with the current assessment done in the Emergency Department    I have conducted an independent evaluation of this patient a history and physical    Comfortable appearing, conversational  Mild tachypnea, wheezing bilaterally, symmetrically    Notes improved after nebulize treatment, plan to complete hour long nebulized albuterol and close outpatient follow-up

## 2021-04-25 LAB
ATRIAL RATE: 95 BPM
P AXIS: 56 DEGREES
PR INTERVAL: 122 MS
QRS AXIS: 79 DEGREES
QRSD INTERVAL: 84 MS
QT INTERVAL: 364 MS
QTC INTERVAL: 457 MS
T WAVE AXIS: 51 DEGREES
VENTRICULAR RATE: 95 BPM

## 2021-07-15 ENCOUNTER — APPOINTMENT (EMERGENCY)
Dept: RADIOLOGY | Facility: HOSPITAL | Age: 41
End: 2021-07-15

## 2021-07-15 ENCOUNTER — HOSPITAL ENCOUNTER (EMERGENCY)
Facility: HOSPITAL | Age: 41
Discharge: HOME | End: 2021-07-15
Admitting: EMERGENCY MEDICINE
Payer: COMMERCIAL

## 2021-07-15 VITALS
TEMPERATURE: 97 F | RESPIRATION RATE: 18 BRPM | BODY MASS INDEX: 23.23 KG/M2 | HEART RATE: 105 BPM | WEIGHT: 115 LBS | OXYGEN SATURATION: 97 % | DIASTOLIC BLOOD PRESSURE: 96 MMHG | SYSTOLIC BLOOD PRESSURE: 135 MMHG

## 2021-07-15 DIAGNOSIS — S61.258A DOG BITE OF INDEX FINGER, INITIAL ENCOUNTER: Primary | ICD-10-CM
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
## 2021-07-15 PROCEDURE — 90375 RABIES IG IM/SC: CPT | Performed by: PHYSICIAN ASSISTANT

## 2021-07-15 PROCEDURE — 96372 THER/PROPH/DIAG INJ SC/IM: CPT

## 2021-07-15 PROCEDURE — 73140 X-RAY EXAM OF FINGER(S): CPT

## 2021-07-15 PROCEDURE — 90471 IMMUNIZATION ADMIN: CPT

## 2021-07-15 PROCEDURE — 29130 APPL FINGER SPLINT STATIC: CPT | Performed by: PHYSICIAN ASSISTANT

## 2021-07-15 PROCEDURE — 99283 EMERGENCY DEPT VISIT LOW MDM: CPT

## 2021-07-15 PROCEDURE — 99284 EMERGENCY DEPT VISIT MOD MDM: CPT | Performed by: PHYSICIAN ASSISTANT

## 2021-07-15 PROCEDURE — 90675 RABIES VACCINE IM: CPT | Performed by: PHYSICIAN ASSISTANT

## 2021-07-15 RX ORDER — AMOXICILLIN AND CLAVULANATE POTASSIUM 875; 125 MG/1; MG/1
1 TABLET, FILM COATED ORAL EVERY 12 HOURS SCHEDULED
Qty: 14 TABLET | Refills: 0 | Status: SHIPPED | OUTPATIENT
Start: 2021-07-15 | End: 2021-07-22

## 2021-07-15 RX ORDER — AMOXICILLIN AND CLAVULANATE POTASSIUM 875; 125 MG/1; MG/1
1 TABLET, FILM COATED ORAL ONCE
Status: COMPLETED | OUTPATIENT
Start: 2021-07-15 | End: 2021-07-15

## 2021-07-15 RX ADMIN — RABIES IMMUNE GLOBULIN (HUMAN) 1050 UNITS: 300 INJECTION, SOLUTION INFILTRATION; INTRAMUSCULAR at 11:39

## 2021-07-15 RX ADMIN — RABIES VIRUS STRAIN PM-1503-3M ANTIGEN (PROPIOLACTONE INACTIVATED) AND WATER 1 ML: KIT at 11:39

## 2021-07-15 RX ADMIN — AMOXICILLIN AND CLAVULANATE POTASSIUM 1 TABLET: 875; 125 TABLET, FILM COATED ORAL at 11:39

## 2021-07-15 NOTE — DISCHARGE INSTRUCTIONS
Return for following vaccinations on 07/18, 07/22, 07/29   [facility] instead of returning to the emergency department

## 2021-07-15 NOTE — ED PROVIDER NOTES
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 her in her right finger  Patient is having pain and swelling in the right index finger and a small puncture wound  Patient's tetanus shot is up-to-date  The client has dementia and is unsure of when the dog had its last vaccinations  Dog Bite  Contact animal:  Dog  Location:  Finger  Finger injury location:  R index finger  Time since incident:  1 hour  Pain details:     Quality:  Aching    Severity:  Moderate    Timing:  Constant    Progression:  Worsening  Incident location:  Work  Notifications:  None  Animal's rabies vaccination status:  Unknown  Animal in possession: yes    Tetanus status:  Up to date  Relieved by:  Nothing  Worsened by:  Nothing  Ineffective treatments:  None tried  Associated symptoms: no fever, no numbness, no rash and no swelling        Prior to Admission Medications   Prescriptions Last Dose Informant Patient Reported?  Taking?   escitalopram (LEXAPRO) 20 mg tablet   Yes No   Sig: Take 20 mg by mouth daily at bedtime    folic acid (FOLVITE) 1 mg tablet   No No   Sig: Take 1 tablet (1 mg total) by mouth daily   lisinopril (ZESTRIL) 5 mg tablet   Yes No   Sig: Take 5 mg by mouth daily   magnesium 30 MG tablet   Yes No   Sig: Take 30 mg by mouth daily Unknown dosage    potassium chloride (K-DUR,KLOR-CON) 20 mEq tablet   Yes No   Sig: Take 20 mEq by mouth daily      Facility-Administered Medications: None       Past Medical History:   Diagnosis Date    Alcohol abuse     Asthma     Hypertension     Seizure ([facility] [address] )     Tubal ligation status        Past Surgical History:   Procedure Laterality Date    ANKLE SURGERY      BREAST BIOPSY      SALPINGOOPHORECTOMY      TUBAL LIGATION         Family History   Problem Relation Age of Onset    Hypertension Father      I have reviewed and agree with the history as documented  E-Cigarette/Vaping    E-Cigarette Use Current Every Day User      E-Cigarette/Vaping Substances    THC Yes      Social History     Tobacco Use    Smoking status: Current Every Day Smoker     Packs/day: 0 50     Types: Cigarettes    Smokeless tobacco: Never Used   Vaping Use    Vaping Use: Every day    Substances: THC   Substance Use Topics    Alcohol use: Yes     Comment: One pint of vodka per day    Drug use: Yes     Types: Marijuana       Review of Systems   Constitutional: Negative for chills and fever  HENT: Negative for ear pain and sore throat  Eyes: Negative for pain and visual disturbance  Respiratory: Negative for cough and shortness of breath  Cardiovascular: Negative for chest pain and palpitations  Gastrointestinal: Negative for abdominal pain and vomiting  Genitourinary: Negative for dysuria and hematuria  Musculoskeletal: Negative for arthralgias and back pain  Skin: Negative for color change and rash  Neurological: Negative for seizures, syncope and numbness  All other systems reviewed and are negative  Physical Exam  Physical Exam  Vitals and nursing note reviewed  Constitutional:       General: She is not in acute distress  Appearance: She is well-developed  HENT:      Head: Normocephalic and atraumatic  Eyes:      Extraocular Movements: Extraocular movements intact  Conjunctiva/sclera: Conjunctivae normal       Pupils: Pupils are equal, round, and reactive to light  Cardiovascular:      Rate and Rhythm: Normal rate and regular rhythm  Pulses: Normal pulses  Heart sounds: No murmur heard  Pulmonary:      Effort: Pulmonary effort is normal  No respiratory distress  Breath sounds: Normal breath sounds     Abdominal: Palpations: Abdomen is soft  Tenderness: There is no abdominal tenderness  There is no right CVA tenderness or left CVA tenderness  Musculoskeletal:      Cervical back: Neck supple  Comments: Very small nonbleeding puncture wound to the anterior 2nd index finger around the PIP   Skin:     General: Skin is warm and dry  Capillary Refill: Capillary refill takes less than 2 seconds  Neurological:      General: No focal deficit present  Mental Status: She is alert and oriented to person, place, and time  Vital Signs  ED Triage Vitals [07/15/21 1041]   Temperature Pulse Respirations Blood Pressure SpO2   (!) 97 °F (36 1 °C) 105 18 135/96 97 %      Temp Source Heart Rate Source Patient Position - Orthostatic VS BP Location FiO2 (%)   Temporal Monitor -- -- --      Pain Score       8           Vitals:    07/15/21 1041   BP: 135/96   Pulse: 105         Visual Acuity      ED Medications  Medications   amoxicillin-clavulanate (AUGMENTIN) 875-125 mg per tablet 1 tablet (has no administration in time range)   rabies vaccine, human diploid (IMOVAX RABIES) IM injection 1 mL (has no administration in time range)   rabies immune globulin, human (HyperRAB) injection 1,050 Units (has no administration in time range)       Diagnostic Studies  Results Reviewed     None                 XR finger second digit-index RIGHT   ED Interpretation by Elvia Emanuel PA-C (07/15 1117)   No acute fractures no foreign bodies                 Procedures  Splint application    Date/Time: 7/15/2021 11:29 AM  Performed by: Elvia Emanuel PA-C  Authorized by: Elvia Emanuel PA-C   Universal Protocol:  Procedure performed by:  Consent: Verbal consent obtained    Risks and benefits: risks, benefits and alternatives were discussed  Consent given by: patient  Timeout called at: 7/15/2021 11:29 AM   Patient identity confirmed: verbally with patient      Pre-procedure details:     Sensation:  Normal  Procedure details: Laterality:  Right    Location:  Finger    Finger:  R index finger    Splint type:  Finger splint, static  Post-procedure details:     Pain:  Improved    Sensation:  Normal    Patient tolerance of procedure: Tolerated well, no immediate complications             ED Course                                           MDM  Number of Diagnoses or Management Options  Dog bite of index finger, initial encounter  Diagnosis management comments: Patient's x-ray did not demonstrate any fractures or foreign bodies  Patient was covered with Augmentin  Tetanus was up-to-date  Unknown rabies vaccination for the dog that bit her today  Started rabies vaccination series with immunoglobulin today instructed to return in 3, 7 and 14 days  Patient also given an aluminum finger splint for comfort  Amount and/or Complexity of Data Reviewed  Tests in the radiology section of CPT®: ordered and reviewed  Decide to obtain previous medical records or to obtain history from someone other than the patient: yes  Review and summarize past medical records: yes  Independent visualization of images, tracings, or specimens: yes    Risk of Complications, Morbidity, and/or Mortality  Presenting problems: low  Diagnostic procedures: low  Management options: low    Patient Progress  Patient progress: stable      Disposition  Final diagnoses:   Dog bite of index finger, initial encounter     Time reflects when diagnosis was documented in both MDM as applicable and the Disposition within this note     Time User Action Codes Description Comment    7/15/2021 11:17 AM Sakina Cortes Add [X62 520Z,  Ernestina Paget  0XXA] Dog bite of index finger, initial encounter       ED Disposition     ED Disposition Condition Date/Time Comment    Discharge Stable Thu Jul 15, 2021 11:18 AM Mary Porras discharge to home/self care              Follow-up Information    None         Patient's Medications   Discharge Prescriptions    AMOXICILLIN-CLAVULANATE (AUGMENTIN) 875-125 MG PER TABLET    Take 1 tablet by mouth every 12 (twelve) hours for 7 days       Start Date: 7/15/2021 End Date: 7/22/2021       Order Dose: 1 tablet       Quantity: 14 tablet    Refills: 0     No discharge procedures on file      PDMP Review     None          ED Provider  Electronically Signed by           Trish Villa PA-C  07/15/21 2866

## 2021-07-15 NOTE — Clinical Note
Wei Leslie was seen and treated in our emergency department on 7/15/2021  Diagnosis:     Florin Morrell  may return to work on return date, is off the rest of the shift today  She may return on this date: 07/19/2021         If you have any questions or concerns, please don't hesitate to call        Mindy Henderson PA-C    ______________________________           _______________          _______________  Hospital Representative                              Date                                Time

## 2021-07-29 ENCOUNTER — HOSPITAL ENCOUNTER (EMERGENCY)
Facility: HOSPITAL | Age: 41
Discharge: HOME | End: 2021-07-29
Attending: EMERGENCY MEDICINE
Payer: COMMERCIAL

## 2021-07-29 VITALS
OXYGEN SATURATION: 95 % | DIASTOLIC BLOOD PRESSURE: 96 MMHG | TEMPERATURE: 97.9 F | RESPIRATION RATE: 20 BRPM | WEIGHT: 110 LBS | HEIGHT: 59 IN | HEART RATE: 105 BPM | SYSTOLIC BLOOD PRESSURE: 126 MMHG | BODY MASS INDEX: 22.18 KG/M2

## 2021-07-29 DIAGNOSIS — M79.601 MUSCULOSKELETAL PAIN OF RIGHT UPPER EXTREMITY: Primary | ICD-10-CM

## 2021-07-29 PROCEDURE — 99282 EMERGENCY DEPT VISIT SF MDM: CPT | Performed by: EMERGENCY MEDICINE

## 2021-07-29 PROCEDURE — 99283 EMERGENCY DEPT VISIT LOW MDM: CPT

## 2021-07-29 NOTE — ED PROVIDER NOTES
History  Chief Complaint   Patient presents with    Hand Pain     Pt reports being bitten by a dog approx 2 weeks ago, seen here, dishcarged home  States that she is still having pain that shoots from R hand up to elbow  79-year-old female complains of right hand pain is shooting into the right forearm after dog bite at right 2nd finger recently, no numbness or weakness  Not using any medications, does not like to take any until not to use acetaminophen secondary to fatty liver  Notes she did follow up at work physician, but has no future evaluation set up, is using finger splint  No arm swelling, chest pain or dyspnea      Medical Problem  Location:  Right upper extremity  Quality:  Ache, shooting pain  Severity:  Moderate (Sometimes severe, worse with movement)  Onset quality:  Sudden  Timing:  Constant  Progression:  Worsening  Chronicity:  New  Context:  Recent history of dog bite  Relieved by:  Splinting  Worsened by: Movement  Ineffective treatments:  None  Associated symptoms: no fever        Prior to Admission Medications   Prescriptions Last Dose Informant Patient Reported?  Taking?   escitalopram (LEXAPRO) 20 mg tablet   Yes Yes   Sig: Take 20 mg by mouth daily at bedtime    folic acid (FOLVITE) 1 mg tablet   No Yes   Sig: Take 1 tablet (1 mg total) by mouth daily   lisinopril (ZESTRIL) 5 mg tablet   Yes Yes   Sig: Take 5 mg by mouth daily   magnesium 30 MG tablet   Yes Yes   Sig: Take 30 mg by mouth daily Unknown dosage    potassium chloride (K-DUR,KLOR-CON) 20 mEq tablet   Yes Yes   Sig: Take 20 mEq by mouth daily      Facility-Administered Medications: None       Past Medical History:   Diagnosis Date    Alcohol abuse     Asthma     Hypertension     Seizure ([address] )     Tubal ligation status        Past Surgical History:   Procedure Laterality Date    ANKLE SURGERY      BREAST BIOPSY      SALPINGOOPHORECTOMY      TUBAL LIGATION         Family History   Problem Relation Age of Onset    Hypertension Father      I have reviewed and agree with the history as documented  E-Cigarette/Vaping    E-Cigarette Use Current Every Day User      E-Cigarette/Vaping Substances    THC Yes      Social History     Tobacco Use    Smoking status: Current Every Day Smoker     Packs/day: 0 50     Types: Cigarettes    Smokeless tobacco: Never Used   Vaping Use    Vaping Use: Every day    Substances: THC   Substance Use Topics    Alcohol use: Yes     Comment: One pint of vodka per day    Drug use: Yes     Types: Marijuana       Review of Systems   Constitutional: Negative for fever  All other systems reviewed and are negative  Physical Exam  Physical Exam  Vitals and nursing note reviewed  Constitutional:       Appearance: Normal appearance  Musculoskeletal:      Comments: Right second finger without deformity or swelling is generally tender, intact rom is slow, incomplete, no mallet deformity, sensation intact distally, no hand swelling, mild general tenderness, right forearm generally tender, no deformity, no swelling, intact rom and strength at wrist / elbow / shoulder   Skin:     Comments: Tiny punctures right second anterior mid phalanx healing, no pustular changes, no erythema, mildly tender   Neurological:      Mental Status: She is alert                       Vital Signs  ED Triage Vitals   Temperature Pulse Respirations Blood Pressure SpO2   07/29/21 1149 07/29/21 1149 07/29/21 1149 07/29/21 1151 07/29/21 1149   97 9 °F (36 6 °C) 105 20 126/96 95 %      Temp Source Heart Rate Source Patient Position - Orthostatic VS BP Location FiO2 (%)   07/29/21 1149 07/29/21 1149 -- -- --   Temporal Monitor         Pain Score       07/29/21 1149       Worst Possible Pain           Vitals:    07/29/21 1149 07/29/21 1151   BP:  126/96   Pulse: 105          Visual Acuity      ED Medications  Medications - No data to display    Diagnostic Studies  Results Reviewed     None                 No orders to display Procedures  Procedures         ED Course                                           MDM    Disposition  Final diagnoses:   Musculoskeletal pain of right upper extremity - second finger dog bite injury     Time reflects when diagnosis was documented in both MDM as applicable and the Disposition within this note     Time User Action Codes Description Comment    7/29/2021 12:19 PM Clemente Joiner Add [G88 [id number]] Musculoskeletal pain of right upper extremity     7/29/2021 12:20 PM Clemente Joiner Modify [S32 [id number]] Musculoskeletal pain of right upper extremity second finger dog bite injury      ED Disposition     ED Disposition Condition Date/Time Comment    Discharge Stable u Jul 29, 2021 12:19 PM Rosy Gaston discharge to home/self care  Follow-up Information     Follow up With Specialties Details Why Shirley Fernández MD Internal Medicine, Cardiology Schedule an appointment as soon as possible for a visit in 1 week  [address] [place] [address]  [phone]            Patient's Medications   Discharge Prescriptions    No medications on file     No discharge procedures on file      PDMP Review     None          ED Provider  Electronically Signed by           Jourdan Jefferson DO  07/29/21 9563

## 2021-07-29 NOTE — Clinical Note
Nela Wilder was seen and treated in our emergency department on 7/29/2021  Diagnosis:     Lola Anthony  may return to work on return date  She may return on this date: 08/02/2021         If you have any questions or concerns, please don't hesitate to call        Henry Harris DO    ______________________________           _______________          _______________  Hospital Representative                              Date                                Time

## 2021-08-09 ENCOUNTER — APPOINTMENT (INPATIENT)
Dept: CT IMAGING | Facility: HOSPITAL | Age: 41
DRG: 282 | End: 2021-08-09
Payer: COMMERCIAL

## 2021-08-09 ENCOUNTER — HOSPITAL ENCOUNTER (INPATIENT)
Facility: HOSPITAL | Age: 41
LOS: 3 days | Discharge: HOME | DRG: 282 | End: 2021-08-12
Attending: EMERGENCY MEDICINE | Admitting: FAMILY MEDICINE
Payer: COMMERCIAL

## 2021-08-09 ENCOUNTER — APPOINTMENT (EMERGENCY)
Dept: RADIOLOGY | Facility: HOSPITAL | Age: 41
DRG: 282 | End: 2021-08-09
Payer: COMMERCIAL

## 2021-08-09 ENCOUNTER — APPOINTMENT (INPATIENT)
Dept: ULTRASOUND IMAGING | Facility: HOSPITAL | Age: 41
DRG: 282 | End: 2021-08-09
Payer: COMMERCIAL

## 2021-08-09 DIAGNOSIS — F10.239 ALCOHOL WITHDRAWAL (HCC): ICD-10-CM

## 2021-08-09 DIAGNOSIS — F10.10 ALCOHOL ABUSE: ICD-10-CM

## 2021-08-09 DIAGNOSIS — K85.20 ALCOHOL-INDUCED ACUTE PANCREATITIS WITHOUT INFECTION OR NECROSIS: Primary | ICD-10-CM

## 2021-08-09 DIAGNOSIS — N39.0 UTI (URINARY TRACT INFECTION): ICD-10-CM

## 2021-08-09 DIAGNOSIS — E87.1 HYPONATREMIA: ICD-10-CM

## 2021-08-09 DIAGNOSIS — R79.89 ELEVATED LFTS: ICD-10-CM

## 2021-08-09 DIAGNOSIS — E87.2 LACTIC ACIDOSIS: ICD-10-CM

## 2021-08-09 PROBLEM — E87.29 HIGH ANION GAP METABOLIC ACIDOSIS: Status: ACTIVE | Noted: 2020-07-05

## 2021-08-09 PROBLEM — N30.90 CYSTITIS: Status: ACTIVE | Noted: 2021-08-09

## 2021-08-09 LAB
ALBUMIN SERPL BCP-MCNC: 3.9 G/DL (ref 3.5–5)
ALP SERPL-CCNC: 138 U/L (ref 46–116)
ALT SERPL W P-5'-P-CCNC: 59 U/L (ref 12–78)
ANION GAP SERPL CALCULATED.3IONS-SCNC: 23 MMOL/L (ref 4–13)
ANION GAP SERPL CALCULATED.3IONS-SCNC: 26 MMOL/L (ref 4–13)
AST SERPL W P-5'-P-CCNC: 212 U/L (ref 5–45)
ATRIAL RATE: 0 BPM
ATRIAL RATE: 96 BPM
BACTERIA UR QL AUTO: ABNORMAL /HPF
BASOPHILS # BLD MANUAL: 0.06 THOUSAND/UL (ref 0–0.1)
BASOPHILS NFR MAR MANUAL: 1 % (ref 0–1)
BILIRUB SERPL-MCNC: 0.9 MG/DL (ref 0.2–1)
BILIRUB UR QL STRIP: ABNORMAL
BUN SERPL-MCNC: 4 MG/DL (ref 5–25)
BUN SERPL-MCNC: 6 MG/DL (ref 5–25)
CALCIUM SERPL-MCNC: 6.8 MG/DL (ref 8.3–10.1)
CALCIUM SERPL-MCNC: 7.8 MG/DL (ref 8.3–10.1)
CHLORIDE SERPL-SCNC: 93 MMOL/L (ref 100–108)
CHLORIDE SERPL-SCNC: 96 MMOL/L (ref 100–108)
CLARITY UR: ABNORMAL
CO2 SERPL-SCNC: 12 MMOL/L (ref 21–32)
CO2 SERPL-SCNC: 14 MMOL/L (ref 21–32)
COLOR UR: ABNORMAL
CREAT SERPL-MCNC: 0.64 MG/DL (ref 0.6–1.3)
CREAT SERPL-MCNC: 0.79 MG/DL (ref 0.6–1.3)
CRP SERPL QL: <0.5 MG/L
EOSINOPHIL # BLD MANUAL: 0.18 THOUSAND/UL (ref 0–0.4)
EOSINOPHIL NFR BLD MANUAL: 3 % (ref 0–6)
ERYTHROCYTE [DISTWIDTH] IN BLOOD BY AUTOMATED COUNT: 12.4 % (ref 11.6–15.1)
ETHANOL SERPL-MCNC: 191 MG/DL (ref 0–3)
EXT PREG TEST URINE: NEGATIVE
EXT. CONTROL ED NAV: NORMAL
GFR SERPL CREATININE-BSD FRML MDRD: 111 ML/MIN/1.73SQ M
GFR SERPL CREATININE-BSD FRML MDRD: 93 ML/MIN/1.73SQ M
GLUCOSE SERPL-MCNC: 72 MG/DL (ref 65–140)
GLUCOSE SERPL-MCNC: 89 MG/DL (ref 65–140)
GLUCOSE UR STRIP-MCNC: NEGATIVE MG/DL
HCT VFR BLD AUTO: 42 % (ref 34.8–46.1)
HGB BLD-MCNC: 14.3 G/DL (ref 11.5–15.4)
HGB UR QL STRIP.AUTO: ABNORMAL
KETONES UR STRIP-MCNC: ABNORMAL MG/DL
LACTATE SERPL-SCNC: 2 MMOL/L (ref 0.5–2)
LACTATE SERPL-SCNC: 2.8 MMOL/L (ref 0.5–2)
LACTATE SERPL-SCNC: 3.5 MMOL/L (ref 0.5–2)
LACTATE SERPL-SCNC: 5.3 MMOL/L (ref 0.5–2)
LEUKOCYTE ESTERASE UR QL STRIP: ABNORMAL
LIPASE SERPL-CCNC: 1002 U/L (ref 73–393)
LYMPHOCYTES # BLD AUTO: 1.34 THOUSAND/UL (ref 0.6–4.47)
LYMPHOCYTES # BLD AUTO: 23 % (ref 14–44)
MAGNESIUM SERPL-MCNC: 1 MG/DL (ref 1.6–2.6)
MCH RBC QN AUTO: 38.1 PG (ref 26.8–34.3)
MCHC RBC AUTO-ENTMCNC: 34 G/DL (ref 31.4–37.4)
MCV RBC AUTO: 112 FL (ref 82–98)
MONOCYTES # BLD AUTO: 0.41 THOUSAND/UL (ref 0–1.22)
MONOCYTES NFR BLD: 7 % (ref 4–12)
NEUTROPHILS # BLD MANUAL: 3.85 THOUSAND/UL (ref 1.85–7.62)
NEUTS BAND NFR BLD MANUAL: 1 % (ref 0–8)
NEUTS SEG NFR BLD AUTO: 65 % (ref 43–75)
NITRITE UR QL STRIP: NEGATIVE
NON-SQ EPI CELLS URNS QL MICRO: ABNORMAL /HPF
NRBC BLD AUTO-RTO: 0 /100 WBCS
NRBC BLD AUTO-RTO: 1 /100 WBC (ref 0–2)
P AXIS: 0 DEGREES
P AXIS: 29 DEGREES
PH UR STRIP.AUTO: 5.5 [PH]
PHOSPHATE SERPL-MCNC: 2.3 MG/DL (ref 2.7–4.5)
PLATELET # BLD AUTO: 106 THOUSANDS/UL (ref 149–390)
PLATELET BLD QL SMEAR: ABNORMAL
PMV BLD AUTO: 8.8 FL (ref 8.9–12.7)
POTASSIUM SERPL-SCNC: 4 MMOL/L (ref 3.5–5.3)
POTASSIUM SERPL-SCNC: 4.5 MMOL/L (ref 3.5–5.3)
PR INTERVAL: 152 MS
PROT SERPL-MCNC: 8.6 G/DL (ref 6.4–8.2)
PROT UR STRIP-MCNC: ABNORMAL MG/DL
QRS AXIS: 0 DEGREES
QRS AXIS: 56 DEGREES
QRSD INTERVAL: 0 MS
QRSD INTERVAL: 88 MS
QT INTERVAL: 0 MS
QT INTERVAL: 378 MS
QTC INTERVAL: 0 MS
QTC INTERVAL: 477 MS
RBC # BLD AUTO: 3.75 MILLION/UL (ref 3.81–5.12)
RBC #/AREA URNS AUTO: ABNORMAL /HPF
RBC MORPH BLD: NORMAL
SARS-COV-2 RNA RESP QL NAA+PROBE: NEGATIVE
SODIUM SERPL-SCNC: 131 MMOL/L (ref 136–145)
SODIUM SERPL-SCNC: 133 MMOL/L (ref 136–145)
SP GR UR STRIP.AUTO: >=1.03 (ref 1–1.03)
T WAVE AXIS: 0 DEGREES
T WAVE AXIS: 40 DEGREES
TOTAL CELLS COUNTED SPEC: 100
UROBILINOGEN UR QL STRIP.AUTO: 1 E.U./DL
VENTRICULAR RATE: 0 BPM
VENTRICULAR RATE: 96 BPM
WBC # BLD AUTO: 5.84 THOUSAND/UL (ref 4.31–10.16)
WBC #/AREA URNS AUTO: ABNORMAL /HPF

## 2021-08-09 PROCEDURE — 96374 THER/PROPH/DIAG INJ IV PUSH: CPT

## 2021-08-09 PROCEDURE — 81025 URINE PREGNANCY TEST: CPT | Performed by: EMERGENCY MEDICINE

## 2021-08-09 PROCEDURE — U0005 INFEC AGEN DETEC AMPLI PROBE: HCPCS | Performed by: EMERGENCY MEDICINE

## 2021-08-09 PROCEDURE — C9113 INJ PANTOPRAZOLE SODIUM, VIA: HCPCS | Performed by: EMERGENCY MEDICINE

## 2021-08-09 PROCEDURE — 85007 BL SMEAR W/DIFF WBC COUNT: CPT | Performed by: EMERGENCY MEDICINE

## 2021-08-09 PROCEDURE — 87040 BLOOD CULTURE FOR BACTERIA: CPT | Performed by: EMERGENCY MEDICINE

## 2021-08-09 PROCEDURE — 74177 CT ABD & PELVIS W/CONTRAST: CPT

## 2021-08-09 PROCEDURE — 71045 X-RAY EXAM CHEST 1 VIEW: CPT

## 2021-08-09 PROCEDURE — 87186 SC STD MICRODIL/AGAR DIL: CPT | Performed by: EMERGENCY MEDICINE

## 2021-08-09 PROCEDURE — U0003 INFECTIOUS AGENT DETECTION BY NUCLEIC ACID (DNA OR RNA); SEVERE ACUTE RESPIRATORY SYNDROME CORONAVIRUS 2 (SARS-COV-2) (CORONAVIRUS DISEASE [COVID-19]), AMPLIFIED PROBE TECHNIQUE, MAKING USE OF HIGH THROUGHPUT TECHNOLOGIES AS DESCRIBED BY CMS-2020-01-R: HCPCS | Performed by: EMERGENCY MEDICINE

## 2021-08-09 PROCEDURE — 82077 ASSAY SPEC XCP UR&BREATH IA: CPT | Performed by: EMERGENCY MEDICINE

## 2021-08-09 PROCEDURE — 84100 ASSAY OF PHOSPHORUS: CPT | Performed by: FAMILY MEDICINE

## 2021-08-09 PROCEDURE — 83735 ASSAY OF MAGNESIUM: CPT | Performed by: FAMILY MEDICINE

## 2021-08-09 PROCEDURE — 96361 HYDRATE IV INFUSION ADD-ON: CPT

## 2021-08-09 PROCEDURE — 87086 URINE CULTURE/COLONY COUNT: CPT | Performed by: EMERGENCY MEDICINE

## 2021-08-09 PROCEDURE — 81001 URINALYSIS AUTO W/SCOPE: CPT | Performed by: EMERGENCY MEDICINE

## 2021-08-09 PROCEDURE — G1004 CDSM NDSC: HCPCS

## 2021-08-09 PROCEDURE — 93005 ELECTROCARDIOGRAM TRACING: CPT

## 2021-08-09 PROCEDURE — 87077 CULTURE AEROBIC IDENTIFY: CPT | Performed by: EMERGENCY MEDICINE

## 2021-08-09 PROCEDURE — 76705 ECHO EXAM OF ABDOMEN: CPT

## 2021-08-09 PROCEDURE — 86140 C-REACTIVE PROTEIN: CPT | Performed by: EMERGENCY MEDICINE

## 2021-08-09 PROCEDURE — 36415 COLL VENOUS BLD VENIPUNCTURE: CPT | Performed by: EMERGENCY MEDICINE

## 2021-08-09 PROCEDURE — 85027 COMPLETE CBC AUTOMATED: CPT | Performed by: EMERGENCY MEDICINE

## 2021-08-09 PROCEDURE — 80048 BASIC METABOLIC PNL TOTAL CA: CPT | Performed by: FAMILY MEDICINE

## 2021-08-09 PROCEDURE — 99223 1ST HOSP IP/OBS HIGH 75: CPT | Performed by: FAMILY MEDICINE

## 2021-08-09 PROCEDURE — 99285 EMERGENCY DEPT VISIT HI MDM: CPT

## 2021-08-09 PROCEDURE — 99285 EMERGENCY DEPT VISIT HI MDM: CPT | Performed by: EMERGENCY MEDICINE

## 2021-08-09 PROCEDURE — 80053 COMPREHEN METABOLIC PANEL: CPT | Performed by: EMERGENCY MEDICINE

## 2021-08-09 PROCEDURE — 83605 ASSAY OF LACTIC ACID: CPT | Performed by: EMERGENCY MEDICINE

## 2021-08-09 PROCEDURE — 83690 ASSAY OF LIPASE: CPT | Performed by: EMERGENCY MEDICINE

## 2021-08-09 PROCEDURE — 83605 ASSAY OF LACTIC ACID: CPT | Performed by: FAMILY MEDICINE

## 2021-08-09 RX ORDER — HYDRALAZINE HYDROCHLORIDE 20 MG/ML
5 INJECTION INTRAMUSCULAR; INTRAVENOUS EVERY 6 HOURS PRN
Status: DISCONTINUED | OUTPATIENT
Start: 2021-08-09 | End: 2021-08-10

## 2021-08-09 RX ORDER — MAGNESIUM SULFATE HEPTAHYDRATE 40 MG/ML
2 INJECTION, SOLUTION INTRAVENOUS ONCE
Status: COMPLETED | OUTPATIENT
Start: 2021-08-09 | End: 2021-08-09

## 2021-08-09 RX ORDER — SODIUM CHLORIDE, SODIUM LACTATE, POTASSIUM CHLORIDE, CALCIUM CHLORIDE 600; 310; 30; 20 MG/100ML; MG/100ML; MG/100ML; MG/100ML
75 INJECTION, SOLUTION INTRAVENOUS CONTINUOUS
Status: DISCONTINUED | OUTPATIENT
Start: 2021-08-09 | End: 2021-08-11

## 2021-08-09 RX ORDER — LORAZEPAM 2 MG/ML
2 INJECTION INTRAMUSCULAR EVERY 6 HOURS PRN
Status: DISCONTINUED | OUTPATIENT
Start: 2021-08-09 | End: 2021-08-11

## 2021-08-09 RX ORDER — ONDANSETRON 2 MG/ML
4 INJECTION INTRAMUSCULAR; INTRAVENOUS EVERY 6 HOURS PRN
Status: DISCONTINUED | OUTPATIENT
Start: 2021-08-09 | End: 2021-08-11

## 2021-08-09 RX ORDER — NICOTINE 21 MG/24HR
1 PATCH, TRANSDERMAL 24 HOURS TRANSDERMAL DAILY
Status: DISCONTINUED | OUTPATIENT
Start: 2021-08-09 | End: 2021-08-12 | Stop reason: HOSPADM

## 2021-08-09 RX ORDER — PANTOPRAZOLE SODIUM 40 MG/1
40 INJECTION, POWDER, FOR SOLUTION INTRAVENOUS ONCE
Status: COMPLETED | OUTPATIENT
Start: 2021-08-09 | End: 2021-08-09

## 2021-08-09 RX ORDER — HYDROMORPHONE HCL/PF 1 MG/ML
0.2 SYRINGE (ML) INJECTION EVERY 4 HOURS PRN
Status: DISCONTINUED | OUTPATIENT
Start: 2021-08-09 | End: 2021-08-11

## 2021-08-09 RX ORDER — KETOROLAC TROMETHAMINE 30 MG/ML
15 INJECTION, SOLUTION INTRAMUSCULAR; INTRAVENOUS ONCE
Status: COMPLETED | OUTPATIENT
Start: 2021-08-09 | End: 2021-08-09

## 2021-08-09 RX ORDER — KETOROLAC TROMETHAMINE 30 MG/ML
15 INJECTION, SOLUTION INTRAMUSCULAR; INTRAVENOUS EVERY 6 HOURS PRN
Status: DISCONTINUED | OUTPATIENT
Start: 2021-08-09 | End: 2021-08-10

## 2021-08-09 RX ORDER — ONDANSETRON 2 MG/ML
1 INJECTION INTRAMUSCULAR; INTRAVENOUS ONCE
Status: COMPLETED | OUTPATIENT
Start: 2021-08-09 | End: 2021-08-09

## 2021-08-09 RX ORDER — CEFTRIAXONE 1 G/50ML
1000 INJECTION, SOLUTION INTRAVENOUS ONCE
Status: COMPLETED | OUTPATIENT
Start: 2021-08-09 | End: 2021-08-09

## 2021-08-09 RX ORDER — CEFTRIAXONE 1 G/50ML
1000 INJECTION, SOLUTION INTRAVENOUS EVERY 24 HOURS
Status: DISCONTINUED | OUTPATIENT
Start: 2021-08-10 | End: 2021-08-11

## 2021-08-09 RX ADMIN — KETOROLAC TROMETHAMINE 15 MG: 30 INJECTION, SOLUTION INTRAMUSCULAR at 08:29

## 2021-08-09 RX ADMIN — SODIUM CHLORIDE, SODIUM LACTATE, POTASSIUM CHLORIDE, AND CALCIUM CHLORIDE 150 ML/HR: .6; .31; .03; .02 INJECTION, SOLUTION INTRAVENOUS at 11:42

## 2021-08-09 RX ADMIN — PANTOPRAZOLE SODIUM 40 MG: 40 INJECTION, POWDER, FOR SOLUTION INTRAVENOUS at 09:25

## 2021-08-09 RX ADMIN — FOLIC ACID: 5 INJECTION, SOLUTION INTRAMUSCULAR; INTRAVENOUS; SUBCUTANEOUS at 11:46

## 2021-08-09 RX ADMIN — SODIUM CHLORIDE, SODIUM LACTATE, POTASSIUM CHLORIDE, AND CALCIUM CHLORIDE 150 ML/HR: .6; .31; .03; .02 INJECTION, SOLUTION INTRAVENOUS at 20:57

## 2021-08-09 RX ADMIN — SODIUM CHLORIDE 1000 ML: 0.9 INJECTION, SOLUTION INTRAVENOUS at 08:30

## 2021-08-09 RX ADMIN — MAGNESIUM SULFATE HEPTAHYDRATE 2 G: 40 INJECTION, SOLUTION INTRAVENOUS at 17:20

## 2021-08-09 RX ADMIN — ENOXAPARIN SODIUM 40 MG: 40 INJECTION SUBCUTANEOUS at 11:40

## 2021-08-09 RX ADMIN — CEFTRIAXONE 1000 MG: 1 INJECTION, SOLUTION INTRAVENOUS at 09:26

## 2021-08-09 RX ADMIN — SODIUM CHLORIDE 1000 ML: 0.9 INJECTION, SOLUTION INTRAVENOUS at 09:26

## 2021-08-09 RX ADMIN — IOHEXOL 100 ML: 350 INJECTION, SOLUTION INTRAVENOUS at 09:49

## 2021-08-09 NOTE — PLAN OF CARE
Problem: PAIN - ADULT  Goal: Verbalizes/displays adequate comfort level or baseline comfort level  Description: Interventions:  - Encourage patient to monitor pain and request assistance  - Assess pain using appropriate pain scale  - Administer analgesics based on type and severity of pain and evaluate response  - Implement non-pharmacological measures as appropriate and evaluate response  - Consider cultural and social influences on pain and pain management  - Notify physician/advanced practitioner if interventions unsuccessful or patient reports new pain  Outcome: Progressing     Problem: INFECTION - ADULT  Goal: Absence or prevention of progression during hospitalization  Description: INTERVENTIONS:  - Assess and monitor for signs and symptoms of infection  - Monitor lab/diagnostic results  - Monitor all insertion sites, i e  indwelling lines, tubes, and drains  - Monitor endotracheal if appropriate and nasal secretions for changes in amount and color  - [place] appropriate cooling/warming therapies per order  - Administer medications as ordered  - Instruct and encourage patient and family to use good hand hygiene technique  - Identify and instruct in appropriate isolation precautions for identified infection/condition  Outcome: Progressing  Goal: Absence of fever/infection during neutropenic period  Description: INTERVENTIONS:  - Monitor WBC    Outcome: Progressing     Problem: SAFETY ADULT  Goal: Patient will remain free of falls  Description: INTERVENTIONS:  - Educate patient/family on patient safety including physical limitations  - Instruct patient to call for assistance with activity   - Consult OT/PT to assist with strengthening/mobility   - Keep Call bell within reach  - Keep bed low and locked with side rails adjusted as appropriate  - Keep care items and personal belongings within reach  - Initiate and maintain comfort rounds  - Make Fall Risk Sign visible to staff  - Offer Toileting every    Hours, in advance of need  - Initiate/Maintain   alarm  - Obtain necessary fall risk management equipment:   - Apply yellow socks and bracelet for high fall risk patients  - Consider moving patient to room near nurses station  Outcome: Progressing  Goal: Maintain or return to baseline ADL function  Description: INTERVENTIONS:  -  Assess patient's ability to carry out ADLs; assess patient's baseline for ADL function and identify physical deficits which impact ability to perform ADLs (bathing, care of mouth/teeth, toileting, grooming, dressing, etc )  - Assess/evaluate cause of self-care deficits   - Assess range of motion  - Assess patient's mobility; develop plan if impaired  - Assess patient's need for assistive devices and provide as appropriate  - Encourage maximum independence but intervene and supervise when necessary  - Involve family in performance of ADLs  - Assess for home care needs following discharge   - Consider OT consult to assist with ADL evaluation and planning for discharge  - Provide patient education as appropriate  Outcome: Progressing  Goal: Maintains/Returns to pre admission functional level  Description: INTERVENTIONS:  - Perform BMAT or MOVE assessment daily    - Set and communicate daily mobility goal to care team and patient/family/caregiver  - Collaborate with rehabilitation services on mobility goals if consulted  - Perform Range of Motion   times a day  - Reposition patient every     hours  - Dangle patient   times a day  - Stand patient   times a day  - Ambulate patient   times a day  - Out of bed to chair   times a day   - Out of bed for meals    times a day  - Out of bed for toileting  - Record patient progress and toleration of activity level   Outcome: Progressing     Problem: DISCHARGE PLANNING  Goal: Discharge to home or other facility with appropriate resources  Description: INTERVENTIONS:  - Identify barriers to discharge w/patient and caregiver  - Arrange for needed discharge resources and transportation as appropriate  - Identify discharge learning needs (meds, wound care, etc )  - Arrange for interpretive services to assist at discharge as needed  - Refer to Case Management Department for coordinating discharge planning if the patient needs post-hospital services based on physician/advanced practitioner order or complex needs related to functional status, cognitive ability, or social support system  Outcome: Progressing     Problem: Knowledge Deficit  Goal: Patient/family/caregiver demonstrates understanding of disease process, treatment plan, medications, and discharge instructions  Description: Complete learning assessment and assess knowledge base    Interventions:  - Provide teaching at level of understanding  - Provide teaching via preferred learning methods  Outcome: Progressing     Problem: RESPIRATORY - ADULT  Goal: Achieves optimal ventilation and oxygenation  Description: INTERVENTIONS:  - Assess for changes in respiratory status  - Assess for changes in mentation and behavior  - Position to facilitate oxygenation and minimize respiratory effort  - Oxygen administered by appropriate delivery if ordered  - Initiate smoking cessation education as indicated  - Encourage broncho-pulmonary hygiene including cough, deep breathe, Incentive Spirometry  - Assess the need for suctioning and aspirate as needed  - Assess and instruct to report SOB or any respiratory difficulty  - Respiratory Therapy support as indicated  Outcome: Progressing     Problem: GASTROINTESTINAL - ADULT  Goal: Minimal or absence of nausea and/or vomiting  Description: INTERVENTIONS:  - Administer IV fluids if ordered to ensure adequate hydration  - Maintain NPO status until nausea and vomiting are resolved  - Nasogastric tube if ordered  - Administer ordered antiemetic medications as needed  - Provide nonpharmacologic comfort measures as appropriate  - Advance diet as tolerated, if ordered  - Consider nutrition services referral to assist patient with adequate nutrition and appropriate food choices  Outcome: Progressing  Goal: Maintains or returns to baseline bowel function  Description: INTERVENTIONS:  - Assess bowel function  - Encourage oral fluids to ensure adequate hydration  - Administer IV fluids if ordered to ensure adequate hydration  - Administer ordered medications as needed  - Encourage mobilization and activity  - Consider nutritional services referral to assist patient with adequate nutrition and appropriate food choices  Outcome: Progressing  Goal: Maintains adequate nutritional intake  Description: INTERVENTIONS:  - Monitor percentage of each meal consumed  - Identify factors contributing to decreased intake, treat as appropriate  - Assist with meals as needed  - Monitor I&O, weight, and lab values if indicated  - Obtain nutrition services referral as needed  Outcome: Progressing  Goal: Oral mucous membranes remain intact  Description: INTERVENTIONS  - Assess oral mucosa and hygiene practices  - Implement preventative oral hygiene regimen  - Implement oral medicated treatments as ordered  - Initiate Nutrition services referral as needed  Outcome: Progressing     Problem: METABOLIC, FLUID AND ELECTROLYTES - ADULT  Goal: Electrolytes maintained within normal limits  Description: INTERVENTIONS:  - Monitor labs and assess patient for signs and symptoms of electrolyte imbalances  - Administer electrolyte replacement as ordered  - Monitor response to electrolyte replacements, including repeat lab results as appropriate  - Instruct patient on fluid and nutrition as appropriate  Outcome: Progressing  Goal: Fluid balance maintained  Description: INTERVENTIONS:  - Monitor labs   - Monitor I/O and WT  - Instruct patient on fluid and nutrition as appropriate  - Assess for signs & symptoms of volume excess or deficit  Outcome: Progressing  Goal: Glucose maintained within target range  Description: INTERVENTIONS:  - Monitor Blood Glucose as ordered  - Assess for signs and symptoms of hyperglycemia and hypoglycemia  - Administer ordered medications to maintain glucose within target range  - Assess nutritional intake and initiate nutrition service referral as needed  Outcome: Progressing

## 2021-08-09 NOTE — H&P
[address]  H&P- Sandeep Diaz 1980, 38 y o  female MRN: [id number]  Unit/Bed#: -01 Encounter: [id number]  Primary Care Provider: Ovi Leigh MD   Date and time admitted to hospital: 8/9/2021  8:17 AM    High anion gap metabolic acidosis  Assessment & Plan  Secondary to alcohol abuse, elevated lactic acid, pancreatitis, patient also has cystitis  Anion gap 26  Continue IV hydration  Keep patient NPO  Continue to monitor labs    * Alcohol-induced acute pancreatitis without infection or necrosis  Assessment & Plan  Evidence with intractable vomiting  Lipase elevated  ETOH level is elevated  Keep patient NPO, continue IV hydration, pain management  Monitor vitals    Alcohol abuse  Assessment & Plan  Drinks vodka on regular basis, last drink last night  ETOH is 191  Monitor for signs symptoms of alcohol withdrawal  Follow CIWA protocol    Hyponatremia  Assessment & Plan  Most likely secondary to vomiting secondary to alcohol abuse  Sodium level is 131  Continue IV hydration    Cystitis  Assessment & Plan  Continue IV ceftriaxone  Follow urine culture    Tobacco abuse  Assessment & Plan  Continue Nicoderm patch    Essential hypertension  Assessment & Plan  Patient remained NPO  Lisinopril remain on hold while patient is NPO  Use hydralazine as per protocol      VTE Pharmacologic Prophylaxis: VTE Score: 3 Moderate Risk (Score 3-4) - Pharmacological DVT Prophylaxis Ordered: heparin  Code Status: Level 1 - Full Code as per patient  Discussion with family: With patient  Anticipated Length of Stay: Patient will be admitted on an inpatient basis with an anticipated length of stay of greater than 2 midnights secondary to To monitor above condition  Total Time for Visit, including Counseling / Coordination of Care: 45 minutes Greater than 50% of this total time spent on direct patient counseling and coordination of care      Chief Complaint:  Vomiting    History of Present Illness:  Micheal Muro is a 38 y o  female with a PMH of alcohol abuse, tobacco abuse who presents with vomiting since this morning  Patient reports she drinks vodka on regular basis, last drink was last night  Denies any fever but had chills  Denies any diarrhea, denies any abdominal pain, any tremor, any confusion  Does smoke cigarette       Review of Systems:  Review of Systems   Constitutional: Positive for activity change, appetite change and chills  Negative for diaphoresis, fatigue, fever and unexpected weight change  HENT: Negative for congestion, dental problem, drooling, ear discharge and sore throat  Eyes: Negative for visual disturbance  Respiratory: Negative for apnea, shortness of breath, wheezing and stridor  Cardiovascular: Negative for chest pain and palpitations  Gastrointestinal: Positive for nausea and vomiting  Negative for abdominal distention, abdominal pain and diarrhea  Genitourinary: Negative for difficulty urinating and dyspareunia  Musculoskeletal: Negative for arthralgias  Skin: Negative for color change, pallor and wound  Neurological: Negative for dizziness, seizures, facial asymmetry, speech difficulty, weakness, light-headedness and numbness  Hematological: Negative for adenopathy  Psychiatric/Behavioral: Negative for agitation  All other systems reviewed and are negative  Past Medical and Surgical History:   Past Medical History:   Diagnosis Date    Alcohol abuse     Asthma     Fatty liver     Hypertension     Seizure ([facility] [address] )     Tubal ligation status        Past Surgical History:   Procedure Laterality Date    ANKLE SURGERY      BREAST BIOPSY      SALPINGOOPHORECTOMY      TUBAL LIGATION         Meds/Allergies:  Prior to Admission medications    Medication Sig Start Date End Date Taking?  Authorizing Provider   folic acid (FOLVITE) 1 mg tablet Take 1 tablet (1 mg total) by mouth daily 7/7/20  Yes Silverio Zelaya MD   lisinopril (ZESTRIL) 5 mg tablet Take 5 mg by mouth daily 11/26/19  Yes Historical Provider, MD   magnesium 30 MG tablet Take 30 mg by mouth daily Unknown dosage    Yes Historical Provider, MD   potassium chloride (K-DUR,KLOR-CON) 20 mEq tablet Take 20 mEq by mouth daily 12/22/20  Yes Historical Provider, MD   escitalopram (LEXAPRO) 20 mg tablet Take 20 mg by mouth daily at bedtime   Patient not taking: Reported on 8/9/2021 2/4/21 8/9/21  Historical Provider, MD     I have reviewed home medications with patient personally  Allergies: Allergies   Allergen Reactions    Tylenol [Acetaminophen] Other (See Comments)     R/t fatty liver    Medical Tape Rash       Social History:  Marital Status: /Civil Union   Occupation:  Unknown  Patient Pre-hospital Living Situation: Home  Patient Pre-hospital Level of Mobility: walks  Patient Pre-hospital Diet Restrictions:  None  Substance Use History:   Social History     Substance and Sexual Activity   Alcohol Use Yes    Comment: One pint of vodka per day     Social History     Tobacco Use   Smoking Status Current Every Day Smoker    Packs/day: 0 50    Types: Cigarettes   Smokeless Tobacco Never Used     Social History     Substance and Sexual Activity   Drug Use Yes    Types: Marijuana       Family History:  Family History   Problem Relation Age of Onset    Hypertension Father        Physical Exam:     Vitals:   Blood Pressure: 144/88 (08/09/21 1020)  Pulse: (!) 110 (08/09/21 1020)  Temperature: 98 8 °F (37 1 °C) (08/09/21 1020)  Temp Source: Temporal (08/09/21 0820)  Respirations: 18 (08/09/21 1020)  Height: 4' 11" (149 9 cm) (08/09/21 0820)  Weight - Scale: 49 2 kg (108 lb 7 5 oz) (08/09/21 0820)  SpO2: 97 % (08/09/21 1020)    Physical Exam  Vitals and nursing note reviewed  Exam conducted with a chaperone present  HENT:      Head: Normocephalic  Nose: No congestion  Mouth/Throat:      Mouth: Mucous membranes are dry  Eyes:      General: No scleral icterus  Conjunctiva/sclera: Conjunctivae normal       Pupils: Pupils are equal, round, and reactive to light  Cardiovascular:      Rate and Rhythm: Tachycardia present  Heart sounds: No murmur heard  No friction rub  No gallop  Pulmonary:      Effort: Pulmonary effort is normal  No respiratory distress  Breath sounds: No stridor  No wheezing or rhonchi  Abdominal:      General: Abdomen is flat  Bowel sounds are normal  There is no distension  Palpations: There is no mass  Tenderness: There is no abdominal tenderness  Hernia: No hernia is present  Musculoskeletal:         General: Normal range of motion  Cervical back: Normal range of motion  Right lower leg: No edema  Left lower leg: No edema  Lymphadenopathy:      Cervical: No cervical adenopathy  Skin:     General: Skin is warm  Neurological:      General: No focal deficit present  Mental Status: She is alert and oriented to person, place, and time  Cranial Nerves: No cranial nerve deficit  Sensory: No sensory deficit  Motor: No weakness        Coordination: Coordination normal       Gait: Gait normal       Deep Tendon Reflexes: Reflexes normal    Psychiatric:         Mood and Affect: Mood normal          Additional Data:     Lab Results:  Results from last 7 days   Lab Units 08/09/21  0825   WBC Thousand/uL 5 84   HEMOGLOBIN g/dL 14 3   HEMATOCRIT % 42 0   PLATELETS Thousands/uL 106*   BANDS PCT % 1   LYMPHO PCT % 23   MONO PCT % 7   EOS PCT % 3     Results from last 7 days   Lab Units 08/09/21  0825   SODIUM mmol/L 131*   POTASSIUM mmol/L 4 0   CHLORIDE mmol/L 93*   CO2 mmol/L 12*   BUN mg/dL 6   CREATININE mg/dL 0 79   ANION GAP mmol/L 26*   CALCIUM mg/dL 7 8*   ALBUMIN g/dL 3 9   TOTAL BILIRUBIN mg/dL 0 90   ALK PHOS U/L 138*   ALT U/L 59   AST U/L 212*   GLUCOSE RANDOM mg/dL 89                 Results from last 7 days   Lab Units 08/09/21  0825   LACTIC ACID mmol/L 5 3*       Imaging: Reviewed radiology reports from this admission including: abdominal/pelvic CT and ultrasound(s)  US gallbladder   Final Result by Rafael Benítez MD (08/09 1059)      Moderate hepatic steatosis  Right renal cysts  Workstation performed: [id number]         CT abdomen pelvis with contrast   Final Result by Rafael Benítez MD (08/09 1049)      Hepatic steatosis  Mild thickening of the ascending colon suggesting colitis  Scattered diverticuli without evidence for acute diverticulosis  Multiple right ovarian cysts measuring up to 2 1 x 1 7 cm  Workstation performed: [id number]         XR chest 1 view portable   ED Interpretation by Mira Dunbar MD (82/98 8035)   NAD      Final Result by Peña Milton MD (43/22 2200)      No acute cardiopulmonary disease  Workstation performed: [id number]             EKG and Other Studies Reviewed on Admission:   · EKG:  sinus tachycardia  ** Please Note: This note has been constructed using a voice recognition system   **

## 2021-08-09 NOTE — ASSESSMENT & PLAN NOTE
Patient remained NPO  Lisinopril remain on hold while patient is NPO  Use hydralazine as per protocol

## 2021-08-09 NOTE — ASSESSMENT & PLAN NOTE
Evidence with intractable vomiting  Lipase elevated  ETOH level is elevated  Keep patient NPO, continue IV hydration, pain management  Monitor vitals

## 2021-08-09 NOTE — ASSESSMENT & PLAN NOTE
Secondary to alcohol abuse, elevated lactic acid, pancreatitis, patient also has cystitis  Anion gap 26  Continue IV hydration  Keep patient NPO  Continue to monitor labs

## 2021-08-09 NOTE — ASSESSMENT & PLAN NOTE
Most likely secondary to vomiting secondary to alcohol abuse  Sodium level is 131  Continue IV hydration

## 2021-08-09 NOTE — ASSESSMENT & PLAN NOTE
Drinks vodka on regular basis, last drink last night  ETOH is 191  Monitor for signs symptoms of alcohol withdrawal  Follow CIWA protocol

## 2021-08-09 NOTE — ED PROVIDER NOTES
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  Patient has not been feeling well since yesterday  Has increased fatigue  Has body aches  Complained of subjective fevers and chills  Has nausea vomiting  Congested  Coughing  Does smoke  Nothing taken prior to arrival   Not COVID vaccinated  Works in healthcare  No recent travel  Was given Zofran in the ambulance with relief of her nausea  History provided by:  Patient   used: No    Flu Symptoms  Presenting symptoms: cough, fatigue, fever, headache, myalgias, nausea and vomiting    Presenting symptoms: no diarrhea, no shortness of breath and no sore throat    Severity:  Mild  Onset quality:  Gradual  Duration:  1 day  Progression:  Worsening  Chronicity:  New  Relieved by:  Nothing  Worsened by:  Nothing  Ineffective treatments:  None tried  Associated symptoms: chills, decreased appetite, decreased physical activity and nasal congestion    Associated symptoms: no ear pain and no neck stiffness        Prior to Admission Medications   Prescriptions Last Dose Informant Patient Reported?  Taking?   folic acid (FOLVITE) 1 mg tablet 8/8/2021 at Unknown time  No Yes   Sig: Take 1 tablet (1 mg total) by mouth daily   lisinopril (ZESTRIL) 5 mg tablet 8/8/2021 at Unknown time  Yes Yes   Sig: Take 5 mg by mouth daily   magnesium 30 MG tablet 8/8/2021 at Unknown time  Yes Yes   Sig: Take 30 mg by mouth daily Unknown dosage    potassium chloride (K-DUR,KLOR-CON) 20 mEq tablet 8/8/2021 at Unknown time  Yes Yes   Sig: Take 20 mEq by mouth daily      Facility-Administered Medications: None       Past Medical History:   Diagnosis Date    Alcohol abuse     Asthma     Fatty liver     Hypertension     Seizure ([facility] [address] )     Tubal ligation status        Past Surgical History:   Procedure Laterality Date    ANKLE SURGERY      BREAST BIOPSY      SALPINGOOPHORECTOMY      TUBAL LIGATION         Family History   Problem Relation Age of Onset    Hypertension Father      I have reviewed and agree with the history as documented  E-Cigarette/Vaping    E-Cigarette Use Current Every Day User      E-Cigarette/Vaping Substances    THC Yes      Social History     Tobacco Use    Smoking status: Current Every Day Smoker     Packs/day: 0 50     Types: Cigarettes    Smokeless tobacco: Never Used   Vaping Use    Vaping Use: Every day    Substances: THC   Substance Use Topics    Alcohol use: Yes     Comment: One pint of vodka per day    Drug use: Yes     Types: Marijuana       Review of Systems   Constitutional: Positive for chills, decreased appetite, fatigue and fever  HENT: Positive for congestion  Negative for ear pain, hearing loss, sore throat, trouble swallowing and voice change  Eyes: Negative for pain and discharge  Respiratory: Positive for cough  Negative for shortness of breath and wheezing  Cardiovascular: Negative for chest pain and palpitations  Gastrointestinal: Positive for nausea and vomiting  Negative for abdominal pain, blood in stool, constipation and diarrhea  Genitourinary: Negative for dysuria, flank pain, frequency and hematuria  Musculoskeletal: Positive for arthralgias and myalgias  Negative for joint swelling, neck pain and neck stiffness  Skin: Negative for rash and wound  Neurological: Positive for headaches  Negative for dizziness, seizures, syncope and facial asymmetry  Psychiatric/Behavioral: Negative for hallucinations, self-injury and suicidal ideas  All other systems reviewed and are negative  Physical Exam  Physical Exam  Vitals and nursing note reviewed  Constitutional:       General: She is not in acute distress  Appearance: She is well-developed  HENT:      Head: Normocephalic and atraumatic        Right Ear: External ear normal       Left Ear: External ear normal    Eyes:      General: No scleral icterus  Right eye: No discharge  Left eye: No discharge  Extraocular Movements: Extraocular movements intact  Conjunctiva/sclera: Conjunctivae normal    Cardiovascular:      Rate and Rhythm: Normal rate and regular rhythm  Heart sounds: Normal heart sounds  No murmur heard  Pulmonary:      Effort: Pulmonary effort is normal       Breath sounds: Normal breath sounds  No wheezing or rales  Abdominal:      General: Bowel sounds are normal  There is no distension  Palpations: Abdomen is soft  Tenderness: There is no abdominal tenderness  There is no guarding or rebound  Musculoskeletal:         General: No deformity  Normal range of motion  Cervical back: Normal range of motion and neck supple  Skin:     General: Skin is warm and dry  Findings: No rash  Neurological:      General: No focal deficit present  Mental Status: She is alert and oriented to person, place, and time  Cranial Nerves: No cranial nerve deficit  Psychiatric:         Mood and Affect: Mood normal          Behavior: Behavior normal          Thought Content:  Thought content normal          Judgment: Judgment normal          Vital Signs  ED Triage Vitals [08/09/21 0820]   Temperature Pulse Respirations Blood Pressure SpO2   97 9 °F (36 6 °C) (!) 111 16 160/97 97 %      Temp Source Heart Rate Source Patient Position - Orthostatic VS BP Location FiO2 (%)   Temporal Monitor Lying Right arm --      Pain Score       Worst Possible Pain           Vitals:    08/09/21 0900 08/09/21 0930 08/09/21 1000 08/09/21 1020   BP: 148/83 151/92 145/88 144/88   Pulse: 100 101 94 (!) 110   Patient Position - Orthostatic VS: Lying Lying Lying          Visual Acuity  Visual Acuity      Most Recent Value   L Pupil Size (mm)  3   R Pupil Size (mm)  3          ED Medications  Medications   lactated ringers infusion (150 mL/hr Intravenous New Bag 8/9/21 1142)   ondansetron (ZOFRAN) injection 4 mg (has no administration in time range)   nicotine (NICODERM CQ) 14 mg/24hr TD 24 hr patch 1 patch (1 patch Transdermal Not Given 8/9/21 1140)   enoxaparin (LOVENOX) subcutaneous injection 40 mg (40 mg Subcutaneous Given 8/9/21 1140)   cefTRIAXone (ROCEPHIN) IVPB (premix in dextrose) 1,000 mg 50 mL (has no administration in time range)   LORazepam (ATIVAN) injection 2 mg (has no administration in time range)   ketorolac (TORADOL) injection 15 mg (has no administration in time range)   HYDROmorphone (DILAUDID) injection 0 2 mg (has no administration in time range)   hydrALAZINE (APRESOLINE) injection 5 mg (has no administration in time range)   folic acid 1 mg, thiamine (VITAMIN B1) 100 mg in sodium chloride 0 9 % 100 mL IV piggyback ( Intravenous New Bag 8/9/21 1146)   sodium chloride 0 9 % bolus 1,000 mL (0 mL Intravenous Stopped 8/9/21 0926)   ketorolac (TORADOL) injection 15 mg (15 mg Intravenous Given 8/9/21 0829)   ondansetron (FOR EMS ONLY) (ZOFRAN) 4 mg/2 mL injection 4 mg (0 mg Does not apply Given to EMS 8/9/21 0827)   sodium chloride 0 9 % bolus 1,000 mL (1,000 mL Intravenous New Bag 8/9/21 0926)   pantoprazole (PROTONIX) injection 40 mg (40 mg Intravenous Given 8/9/21 0925)   cefTRIAXone (ROCEPHIN) IVPB (premix in dextrose) 1,000 mg 50 mL (0 mg Intravenous Stopped 8/9/21 1010)   iohexol (OMNIPAQUE) 350 MG/ML injection (SINGLE-DOSE) 100 mL (100 mL Intravenous Given 8/9/21 0949)       Diagnostic Studies  Results Reviewed     Procedure Component Value Units Date/Time    Lactic acid 2 Hours [[id number]]  (Abnormal) Collected: 08/09/21 1114    Lab Status: Final result Specimen: Blood from Arm, Right Updated: 08/09/21 1151     LACTIC ACID 3 5 mmol/L     Narrative:      Result may be elevated if tourniquet was used during collection      Ethanol [[id number]]  (Abnormal) Collected: 08/09/21 0925    Lab Status: Final result Specimen: Blood from Arm, Left Updated: 08/09/21 0946     Ethanol Lvl 191 mg/dL     Novel Coronavirus (Covid-19),PCR [facility] - 2 Hour Stat [[id number]]  (Normal) Collected: 08/09/21 0825    Lab Status: Final result Specimen: Nares from Nose Updated: 08/09/21 0942     SARS-CoV-2 Negative    Narrative: The specimen collection materials, transport medium, and/or testing methodology utilized in the production of these test results have been proven to be reliable in a limited validation with an abbreviated program under the Emergency Utilization Authorization provided by the FDA  Testing reported as "Presumptive positive" will be confirmed with secondary testing to ensure result accuracy  Clinical caution and judgement should be used with the interpretation of these results with consideration of the clinical impression and other laboratory testing  Testing reported as "Positive" or "Negative" has been proven to be accurate according to standard laboratory validation requirements  All testing is performed with control materials showing appropriate reactivity at standard intervals  Blood culture #2 [[id number]] Collected: 08/09/21 0925    Lab Status: In process Specimen: Blood from Arm, Right Updated: 08/09/21 0938    Blood culture #1 [[id number]] Collected: 08/09/21 0925    Lab Status:  In process Specimen: Blood from Arm, Left Updated: 08/09/21 0935    Manual Differential(PHLEBS Do Not Order) [[id number]]  (Abnormal) Collected: 08/09/21 0825    Lab Status: Final result Specimen: Blood from Arm, Left Updated: 08/09/21 0919     Segmented % 65 %      Bands % 1 %      Lymphocytes % 23 %      Monocytes % 7 %      Eosinophils, % 3 %      Basophils % 1 %      Absolute Neutrophils 3 85 Thousand/uL      Lymphocytes Absolute 1 34 Thousand/uL      Monocytes Absolute 0 41 Thousand/uL      Eosinophils Absolute 0 18 Thousand/uL      Basophils Absolute 0 06 Thousand/uL      Total Counted 100     nRBC 1 /100 WBC      RBC Morphology Normal     Platelet Estimate Borderline    CBC and differential [[id number]]  (Abnormal) Collected: 08/09/21 0825    Lab Status: Final result Specimen: Blood from Arm, Left Updated: 08/09/21 0919     WBC 5 84 Thousand/uL      RBC 3 75 Million/uL      Hemoglobin 14 3 g/dL      Hematocrit 42 0 %       fL      MCH 38 1 pg      MCHC 34 0 g/dL      RDW 12 4 %      MPV 8 8 fL      Platelets 270 Thousands/uL      nRBC 0 /100 WBCs     Narrative: This is an appended report  These results have been appended to a previously verified report  Lactic acid [[id number]]  (Abnormal) Collected: 08/09/21 0825    Lab Status: Final result Specimen: Blood from Arm, Left Updated: 08/09/21 0913     LACTIC ACID 5 3 mmol/L     Narrative:      Result may be elevated if tourniquet was used during collection  Urine Microscopic [[id number]]  (Abnormal) Collected: 08/09/21 0825    Lab Status: Final result Specimen: Urine, Clean Catch Updated: 08/09/21 0907     RBC, UA 10-20 /hpf      WBC, UA 10-20 /hpf      Epithelial Cells Moderate /hpf      Bacteria, UA Innumerable /hpf     Narrative:      Concentrated microscopic done on low volume urine    Urine culture [[id number]] Collected: 08/09/21 0825    Lab Status:  In process Specimen: Urine, Clean Catch Updated: 08/09/21 0907    UA w Reflex to Microscopic w Reflex to Culture [[id number]]  (Abnormal) Collected: 08/09/21 0825    Lab Status: Final result Specimen: Urine, Clean Catch Updated: 08/09/21 0904     Color, UA Straw     Clarity, UA Cloudy     Specific Gravity, UA >=1 030     pH, UA 5 5     Leukocytes, UA Small     Nitrite, UA Negative     Protein,  (2+) mg/dl      Glucose, UA Negative mg/dl      Ketones, UA 40 (2+) mg/dl      Urobilinogen, UA 1 0 E U /dl      Bilirubin, UA Small     Blood, UA Moderate    Comprehensive metabolic panel [[id number]]  (Abnormal) Collected: 08/09/21 0825    Lab Status: Final result Specimen: Blood from Arm, Left Updated: 08/09/21 0904     Sodium 131 mmol/L      Potassium 4 0 mmol/L Chloride 93 mmol/L      CO2 12 mmol/L      ANION GAP 26 mmol/L      BUN 6 mg/dL      Creatinine 0 79 mg/dL      Glucose 89 mg/dL      Calcium 7 8 mg/dL       U/L      ALT 59 U/L      Alkaline Phosphatase 138 U/L      Total Protein 8 6 g/dL      Albumin 3 9 g/dL      Total Bilirubin 0 90 mg/dL      eGFR 93 ml/min/1 73sq m     Narrative:      [place] guidelines for Chronic Kidney Disease (CKD):     Stage 1 with normal or high GFR (GFR > 90 mL/min/1 73 square meters)    Stage 2 Mild CKD (GFR = 60-89 mL/min/1 73 square meters)    Stage 3A Moderate CKD (GFR = 45-59 mL/min/1 73 square meters)    Stage 3B Moderate CKD (GFR = 30-44 mL/min/1 73 square meters)    Stage 4 Severe CKD (GFR = 15-29 mL/min/1 73 square meters)    Stage 5 End Stage CKD (GFR <15 mL/min/1 73 square meters)  Note: GFR calculation is accurate only with a steady state creatinine    Lipase [[id number]]  (Abnormal) Collected: 08/09/21 0825    Lab Status: Final result Specimen: Blood from Arm, Left Updated: 08/09/21 0904     Lipase 1,002 u/L     C-reactive protein [[id number]]  (Normal) Collected: 08/09/21 0825    Lab Status: Final result Specimen: Blood from Arm, Left Updated: 08/09/21 0904     CRP <0 5 mg/L     POCT pregnancy, urine [[id number]]  (Normal) Resulted: 08/09/21 0826    Lab Status: Final result Updated: 08/09/21 0842     EXT PREG TEST UR (Ref: Negative) negative     Control valid                 US gallbladder   Final Result by Vaihbav Nair MD (08/09 1059)      Moderate hepatic steatosis  Right renal cysts  Workstation performed: [id number]         CT abdomen pelvis with contrast   Final Result by Vaibhav Nair MD (08/09 1049)      Hepatic steatosis  Mild thickening of the ascending colon suggesting colitis  Scattered diverticuli without evidence for acute diverticulosis  Multiple right ovarian cysts measuring up to 2 1 x 1 7 cm              Workstation performed: [id number]         XR chest 1 view portable   ED Interpretation by Pradeep Tineo MD (22/36 [id number])   NAD      Final Result by Julio Castellanos MD (56/42 5865)      No acute cardiopulmonary disease  Workstation performed: [id number]                    Procedures  ECG 12 Lead Documentation Only    Date/Time: 8/9/2021 9:22 AM  Performed by: Pradeep Tineo MD  Authorized by: Pradeep Tineo MD     ECG reviewed by me, the ED Provider: yes    Patient location:  ED  Previous ECG:     Previous ECG:  Compared to current    Similarity:  No change  Rate:     ECG rate:  90  Rhythm:     Rhythm: sinus rhythm    Ectopy:     Ectopy: none    QRS:     QRS axis:  Normal             ED Course  ED Course as of Aug 09 1207   [facility] Aug 09, 2021   0912 Patient states she drank a pt of vodka last night  Also drank on Saturday  SBIRT 20yo+      Most Recent Value   SBIRT (22 yo +)   In order to provide better care to our patients, we are screening all of our patients for alcohol and drug use  Would it be okay to ask you these screening questions? Yes Filed at: 08/09/2021 9102   Initial Alcohol Screen: US AUDIT-C    1  How often do you have a drink containing alcohol? 3 Filed at: 08/09/2021 0826   2  How many drinks containing alcohol do you have on a typical day you are drinking? 1 Filed at: 08/09/2021 0826   3b  FEMALE Any Age, or MALE 65+: How often do you have 4 or more drinks on one occassion? 3 Filed at: 08/09/2021 0826   Audit-C Score  7 Filed at: 08/09/2021 2461   Full Alcohol Screen: US AUDIT   4  How often during the last year have you found that you were not able to stop drinking once you had started? 0 Filed at: 08/09/2021 0826   5  How often during past year have you failed to do what was normally expected of you because of drinking? 0 Filed at: 08/09/2021 0826   6   How often in past year have you needed a first drink in the morning to get yourself going after a heavy drinking session? 0 Filed at: 08/09/2021 0826   7  How often in past year have you had feeling of guilt or remorse after drinking? 0 Filed at: 08/09/2021 0826   8  How often in past year have you been unable to remember what happened night before because you had been drinking? 0 Filed at: 08/09/2021 0826   9  Have you or someone else been injured as a result of your drinking? 0 Filed at: 08/09/2021 0826   10  Has a relative, friend, doctor or other health worker been concerned about your drinking and suggested you cut down?   0 Filed at: 08/09/2021 5636   AUDIT Total Score  7 Filed at: 08/09/2021 4521   ADRIEL: How many times in the past year have you    Used an illegal drug or used a prescription medication for non-medical reasons? Never Filed at: 08/09/2021 9680                    MDM  Number of Diagnoses or Management Options  Alcohol abuse  Alcohol-induced acute pancreatitis without infection or necrosis  UTI (urinary tract infection)  Diagnosis management comments: Patient with a past history of alcohol abuse  Admits to drinking a pt of vodka both on Saturday and Sunday  Now with nausea vomiting  Cold symptoms  Lipase elevated at 1000  The patient has no epigastric pain  However, given the patient has nausea and vomiting I suspect the patient has early pancreatitis  Will get a CT scan and ultrasound of the abdomen  Patient also with UTI  Lactic acid is 5 3          Disposition  Final diagnoses:   Alcohol-induced acute pancreatitis without infection or necrosis   Alcohol abuse   UTI (urinary tract infection)   Lactic acidosis   Hyponatremia   Elevated LFTs     Time reflects when diagnosis was documented in both MDM as applicable and the Disposition within this note     Time User Action Codes Description Comment    8/9/2021  9:15 AM Leary Scale Add [K85 20] Alcohol-induced acute pancreatitis without infection or necrosis     8/9/2021  9:15 AM Katerina Pryor Add [F10 10] Alcohol abuse 8/9/2021  9:15 AM Jalyn Pryor [N39 0] UTI (urinary tract infection)     8/9/2021  9:45 AM Marielena Pryor Add [E87 2] Lactic acidosis     8/9/2021  9:45 AM Jalyn Pryor [E87 1] Hyponatremia     8/9/2021  9:45 AM Jalyn Pryor [R79 89] Elevated LFTs       ED Disposition     ED Disposition Condition Date/Time Comment    Admit Stable Mon Aug 9, 2021  9:21 AM Case was discussed with Dr Elías Santillan and the patient's admission status was agreed to be  to the service of Dr Elías Santillan         Follow-up Information    None         Current Discharge Medication List      CONTINUE these medications which have NOT CHANGED    Details   folic acid (FOLVITE) 1 mg tablet Take 1 tablet (1 mg total) by mouth daily  Qty: 30 tablet, Refills: 0    Associated Diagnoses: Alcohol withdrawal syndrome without complication (HCC)      lisinopril (ZESTRIL) 5 mg tablet Take 5 mg by mouth daily      magnesium 30 MG tablet Take 30 mg by mouth daily Unknown dosage       potassium chloride (K-DUR,KLOR-CON) 20 mEq tablet Take 20 mEq by mouth daily           No discharge procedures on file      PDMP Review     None          ED Provider  Electronically Signed by           Jennifer Preston MD  08/09/21 6726

## 2021-08-09 NOTE — Clinical Note
Rosy Gaston was seen and treated in our emergency department on 8/9/2021  Diagnosis:     Santy Blair  may return to work on return date  She may return on this date: 08/11/2021         If you have any questions or concerns, please don't hesitate to call        Jennifer Preston MD    ______________________________           _______________          _______________  Hospital Representative                              Date                                Time

## 2021-08-10 PROBLEM — D69.6 THROMBOCYTOPENIA (HCC): Status: ACTIVE | Noted: 2021-08-10

## 2021-08-10 LAB
ALBUMIN SERPL BCP-MCNC: 3.3 G/DL (ref 3.5–5)
ALP SERPL-CCNC: 109 U/L (ref 46–116)
ALT SERPL W P-5'-P-CCNC: 41 U/L (ref 12–78)
ANION GAP SERPL CALCULATED.3IONS-SCNC: 10 MMOL/L (ref 4–13)
AST SERPL W P-5'-P-CCNC: 125 U/L (ref 5–45)
BASOPHILS # BLD AUTO: 0.01 THOUSANDS/ΜL (ref 0–0.1)
BASOPHILS NFR BLD AUTO: 0 % (ref 0–1)
BILIRUB SERPL-MCNC: 1.76 MG/DL (ref 0.2–1)
BLD SMEAR INTERP: NORMAL
BUN SERPL-MCNC: 3 MG/DL (ref 5–25)
CALCIUM ALBUM COR SERPL-MCNC: 8.6 MG/DL (ref 8.3–10.1)
CALCIUM SERPL-MCNC: 8 MG/DL (ref 8.3–10.1)
CHLORIDE SERPL-SCNC: 97 MMOL/L (ref 100–108)
CO2 SERPL-SCNC: 26 MMOL/L (ref 21–32)
CREAT SERPL-MCNC: 0.73 MG/DL (ref 0.6–1.3)
EOSINOPHIL # BLD AUTO: 0.02 THOUSAND/ΜL (ref 0–0.61)
EOSINOPHIL NFR BLD AUTO: 1 % (ref 0–6)
ERYTHROCYTE [DISTWIDTH] IN BLOOD BY AUTOMATED COUNT: 11.9 % (ref 11.6–15.1)
GFR SERPL CREATININE-BSD FRML MDRD: 103 ML/MIN/1.73SQ M
GLUCOSE SERPL-MCNC: 82 MG/DL (ref 65–140)
HCT VFR BLD AUTO: 36.2 % (ref 34.8–46.1)
HGB BLD-MCNC: 12.6 G/DL (ref 11.5–15.4)
IMM GRANULOCYTES # BLD AUTO: 0.02 THOUSAND/UL (ref 0–0.2)
IMM GRANULOCYTES NFR BLD AUTO: 1 % (ref 0–2)
INR PPP: 1.05 (ref 0.84–1.19)
LIPASE SERPL-CCNC: 768 U/L (ref 73–393)
LYMPHOCYTES # BLD AUTO: 1.1 THOUSANDS/ΜL (ref 0.6–4.47)
LYMPHOCYTES NFR BLD AUTO: 33 % (ref 14–44)
MAGNESIUM SERPL-MCNC: 1.6 MG/DL (ref 1.6–2.6)
MCH RBC QN AUTO: 37.7 PG (ref 26.8–34.3)
MCHC RBC AUTO-ENTMCNC: 34.8 G/DL (ref 31.4–37.4)
MCV RBC AUTO: 108 FL (ref 82–98)
MONOCYTES # BLD AUTO: 0.3 THOUSAND/ΜL (ref 0.17–1.22)
MONOCYTES NFR BLD AUTO: 9 % (ref 4–12)
NEUTROPHILS # BLD AUTO: 1.84 THOUSANDS/ΜL (ref 1.85–7.62)
NEUTS SEG NFR BLD AUTO: 56 % (ref 43–75)
NRBC BLD AUTO-RTO: 0 /100 WBCS
PLATELET # BLD AUTO: 65 THOUSANDS/UL (ref 149–390)
PMV BLD AUTO: 9.3 FL (ref 8.9–12.7)
POTASSIUM SERPL-SCNC: 3.4 MMOL/L (ref 3.5–5.3)
PROT SERPL-MCNC: 7.2 G/DL (ref 6.4–8.2)
PROTHROMBIN TIME: 13.5 SECONDS (ref 11.6–14.5)
RBC # BLD AUTO: 3.34 MILLION/UL (ref 3.81–5.12)
SODIUM SERPL-SCNC: 133 MMOL/L (ref 136–145)
WBC # BLD AUTO: 3.29 THOUSAND/UL (ref 4.31–10.16)

## 2021-08-10 PROCEDURE — 85384 FIBRINOGEN ACTIVITY: CPT | Performed by: FAMILY MEDICINE

## 2021-08-10 PROCEDURE — 83690 ASSAY OF LIPASE: CPT | Performed by: FAMILY MEDICINE

## 2021-08-10 PROCEDURE — 80053 COMPREHEN METABOLIC PANEL: CPT | Performed by: FAMILY MEDICINE

## 2021-08-10 PROCEDURE — 85610 PROTHROMBIN TIME: CPT | Performed by: FAMILY MEDICINE

## 2021-08-10 PROCEDURE — 85025 COMPLETE CBC W/AUTO DIFF WBC: CPT | Performed by: FAMILY MEDICINE

## 2021-08-10 PROCEDURE — 83735 ASSAY OF MAGNESIUM: CPT | Performed by: FAMILY MEDICINE

## 2021-08-10 PROCEDURE — 99232 SBSQ HOSP IP/OBS MODERATE 35: CPT | Performed by: FAMILY MEDICINE

## 2021-08-10 RX ORDER — LANOLIN ALCOHOL/MO/W.PET/CERES
100 CREAM (GRAM) TOPICAL DAILY
Status: DISCONTINUED | OUTPATIENT
Start: 2021-08-11 | End: 2021-08-12 | Stop reason: HOSPADM

## 2021-08-10 RX ORDER — POTASSIUM CHLORIDE 20 MEQ/1
20 TABLET, EXTENDED RELEASE ORAL ONCE
Status: COMPLETED | OUTPATIENT
Start: 2021-08-10 | End: 2021-08-10

## 2021-08-10 RX ORDER — CHLORDIAZEPOXIDE HYDROCHLORIDE 5 MG/1
5 CAPSULE, GELATIN COATED ORAL EVERY 8 HOURS SCHEDULED
Status: DISCONTINUED | OUTPATIENT
Start: 2021-08-10 | End: 2021-08-12 | Stop reason: HOSPADM

## 2021-08-10 RX ADMIN — CEFTRIAXONE 1000 MG: 1 INJECTION, SOLUTION INTRAVENOUS at 10:24

## 2021-08-10 RX ADMIN — CHLORDIAZEPOXIDE HYDROCHLORIDE 5 MG: 5 CAPSULE ORAL at 22:18

## 2021-08-10 RX ADMIN — LORAZEPAM 2 MG: 2 INJECTION INTRAMUSCULAR; INTRAVENOUS at 22:18

## 2021-08-10 RX ADMIN — ENOXAPARIN SODIUM 40 MG: 40 INJECTION SUBCUTANEOUS at 09:17

## 2021-08-10 RX ADMIN — SODIUM CHLORIDE, SODIUM LACTATE, POTASSIUM CHLORIDE, AND CALCIUM CHLORIDE 150 ML/HR: .6; .31; .03; .02 INJECTION, SOLUTION INTRAVENOUS at 03:20

## 2021-08-10 RX ADMIN — POTASSIUM CHLORIDE 20 MEQ: 1500 TABLET, EXTENDED RELEASE ORAL at 12:55

## 2021-08-10 RX ADMIN — CHLORDIAZEPOXIDE HYDROCHLORIDE 5 MG: 5 CAPSULE ORAL at 12:56

## 2021-08-10 RX ADMIN — FOLIC ACID: 5 INJECTION, SOLUTION INTRAMUSCULAR; INTRAVENOUS; SUBCUTANEOUS at 09:17

## 2021-08-10 RX ADMIN — LORAZEPAM 2 MG: 2 INJECTION INTRAMUSCULAR; INTRAVENOUS at 00:39

## 2021-08-10 RX ADMIN — SODIUM CHLORIDE, SODIUM LACTATE, POTASSIUM CHLORIDE, AND CALCIUM CHLORIDE 150 ML/HR: .6; .31; .03; .02 INJECTION, SOLUTION INTRAVENOUS at 10:25

## 2021-08-10 RX ADMIN — HYDROMORPHONE HYDROCHLORIDE 0.2 MG: 1 INJECTION, SOLUTION INTRAMUSCULAR; INTRAVENOUS; SUBCUTANEOUS at 19:33

## 2021-08-10 RX ADMIN — KETOROLAC TROMETHAMINE 15 MG: 30 INJECTION, SOLUTION INTRAMUSCULAR at 00:37

## 2021-08-10 NOTE — ASSESSMENT & PLAN NOTE
· History of but has been 130s 140s  Currently 65,000 trended down from admission  INR is normal no evidence or history of liver cirrhosis she does have hepatic steatosis  · Could be secondary to bone marrow dysfunction with alcohol abuse will monitor closely will get hemolysis smear she was not on heparin Lovenox within discontinue Lovenox discontinue Toradol  To avoid any increased chance of bleeding  · Get a fibrinogen level  · Folic acid and vitamin B12 all repeat a level tomorrow

## 2021-08-10 NOTE — ASSESSMENT & PLAN NOTE
Drinks vodka on regular basis, last drink last night  ETOH is 191  Patient has previously underwent withdrawal patient had last drink Sunday she is currently starting to have withdrawals with tremors in the upper extremities start Librium 5 mg p o  T i d   To avoid going further

## 2021-08-10 NOTE — CASE MANAGEMENT
Case Management Assessment & Discharge Planning Note    Patient name Eric Alvarez  Location /-03 MRN [id number]  : 1980 Date 8/10/2021       Current Admission Date: 2021  Current Admission Diagnosis:  Alcohol-induced acute pancreatitis without infection or necrosis   Patient Active Problem List   Diagnosis    High anion gap metabolic acidosis    Alcohol withdrawal (HCC)    Intractable nausea and vomiting    Essential hypertension    Tobacco abuse    Transaminitis    Fever    Acute kidney [facility] ([facility] [address] )    Hypokalemia    Hypomagnesemia    Anxiety    Alcohol-induced acute pancreatitis without infection or necrosis    Cystitis    Hyponatremia    Alcohol abuse    Thrombocytopenia ([facility] [address] )    Previous Admission - Discharge Date:21   LOS (days): 1  Geometric Mean LOS (GMLOS) (days):   Days to GMLOS: Previous Discharge Diagnosis:  There are no discharge diagnoses documented for the most recent discharge  Risk of Unplanned Readmission Score  Predictive Model Details          15 (Low)  Factor Value    Calculated 8/10/2021 16:04 35% Number of ED visits in last six months 5    Risk of Unplanned Readmission Model 12% ECG/EKG order present in last 6 months     12% Latest calcium low (8 0 mg/dL)     11% Number of active Rx orders 12     10% Diagnosis of electrolyte disorder present     8% Imaging order present in last 6 months     8% Phosphorous result present     4% Age 40     2% Current length of stay 1 279 days         BUNDLE:      Reason for Referral:    OBJECTIVE:  Pt is a 38y o  year old /[employer], white or  [1], female with Denominational preference of Non-Restoration admitted on 2021  8:17 AM  Pt is admitted to [facility] [id number] at [address] [place] [address] with complaints of Alcohol-induced acute pancreatitis without infection or necrosis     Current admission status: Inpatient       Preferred Pharmacy:   [facility]  Kenia Enrique  Phone: [phone] Fax: [phone]    Primary Care Provider: Dudley Francisco MD    Primary Insurance: [facility]  Secondary Insurance:     ASSESSMENT:  Active Health Care Agents    There are no active Health Care Agents on file  Advance Directives  Does patient have a [address]?: No  Was patient offered paperwork?: Yes (declined)  Does patient currently have a Health Care decision maker?: Yes, please see Health Care Proxy section (pts  would be decision maker if pt was unable to do so)  Does patient have Advance Directives?: No  Was patient offered paperwork?: Yes (declined)                   Patient Information  Mental Status: Alert  During Assessment patient was accompanied by: Not accompanied during assessment  Assessment information provided by[de-identified] Patient  Primary Caregiver: Self  Support Systems: Spouse/significant other  Home entry access options   Select all that apply : Stairs  Number of steps to enter home : 4  Type of Current Residence: [address] [place] home  Upon entering residence, is there a bedroom on the main floor (no further steps)?: No  A bedroom is located on the following floor levels of residence (select all that apply):: 2nd [address] [facility] which floors of current residence have a bathroom (select all the apply):: 2nd Floor  Number of steps to 2nd floor from main floor: One Flight  Living Arrangements: Lives w/ Spouse/significant other  Is patient a ?: No    Activities of Daily Living Prior to Admission  Functional Status: Independent  Completes ADLs independently?: Yes  Ambulates independently?: Yes  Does patient use assisted devices?: No  Does patient currently own DME?: No  Does patient have a history of Outpatient Therapy (PT/OT)?: No  Does the patient have a history of Short-Term Rehab?: No  Does patient currently have [facility]?: No         Patient Information Continued  Income Source: Employed  Does patient have prescription coverage?: Yes  Does patient receive dialysis treatments?: No  Does patient have a history of substance abuse?: Yes, Currently using (etoh,thc)  Current substance use preference: Marijuana, Alcohol/ETOH  Is patient currently in treatment for substance abuse?: No  Patient declined treatment information  Does patient have a history of Mental Health Diagnosis?: No         Means of Transportation  Means of Transport to Appts[de-identified] Drives Self  In the past 12 months, has lack of transportation kept you from medical appointments or from getting medications?: No  In the past 12 months, has lack of transportation kept you from meetings, work, or from getting things needed for daily living?: No  Was application for public transport provided?: No    DISCHARGE DETAILS:    Discharge planning discussed with[de-identified] patient  Freedom of Choice: Yes    Contacts  Patient Contacts: , Jennifer  Realtionship to Patient[de-identified] [address]         Is the patient interested in [place] [address] at discharge?: No    DME Referral Provided  Referral made for DME?: No         We would like to be able to fill any required prescriptions on discharge at our [address] [place] [address] and have them delivered to you at discharge in your room  Would you like to participate in this program? : No - Declined    Discharge Destination Plan[de-identified] Home  Transportation at Discharge?: No         Pt is declining any IP or OP ETOH treatment, pt sts she doesn't feel she has a problem with alcohol or drugs

## 2021-08-10 NOTE — UTILIZATION REVIEW
Initial Clinical Review    Admission: Date/Time/Statement:   Admission Orders (From admission, onward)     Ordered        08/09/21 0922  Inpatient Admission  Once                   Orders Placed This Encounter   Procedures    Inpatient Admission     Standing Status:   Standing     Number of Occurrences:   1     Order Specific Question:   Level of Care     Answer:   Med Surg [16]     Order Specific Question:   Estimated length of stay     Answer:   More than 2 Midnights     Order Specific Question:   Certification     Answer:   I certify that inpatient services are medically necessary for this patient for a duration of greater than two midnights  See H&P and MD Progress Notes for additional information about the patient's course of treatment  ED Arrival Information     Expected Arrival Acuity    - 8/9/2021 08:17 Urgent         Means of arrival Escorted by Service Admission type    Ambulance [facility] Urgent         Arrival complaint    pain        Chief Complaint   Patient presents with    Flu Symptoms     Pt reports N/V/D, headache and generalized fatigue since yesterday morning  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  Initial Presentation:    38 y o  female,  To ER from home via EMS,  Admitted IP status,  MS level of care for management of  Hi anion gap Metabolic acidosis in setting of  Alcohol induced acute pancreatitis w/hyponatremia and cystitis  PMH of alcohol abuse, tobacco abuse who presents with vomiting since this morning  Patient reports she drinks vodka on regular basis, last intake was last evening  H/o TTP w/baseline 130's - 140's         IN ER   An gap 26   Alcohol lis 191    Na 131       Cont IVF hydration,  Keep pt NPO,  Trend/replete electrolytes prn   Trend lactic acid q 2 hr until normalized  Cont IVAB & follow urine cx,    Perform serial assessments for alcohol withdrawal w/Ativan prn     Start vit B supplementation     CIWA Scores:   0 from admission until 8/10  @  1020 when score 1 (anxiety)  -  Initiated librium po q 8 hr        Date:   8/10       Day 2: Pt improved, states she feels Hungry,  Will decrease IVF and allow po intake (lo fat diet)   Na improved  PTE  65K - down from admit  Poss 2/2 bone marrow dysfunction with alcohol abuse:  DC Toradol and lovenox  Get hemolysis smear, fibrinogen level, Folic acid and vitamin B12,  Blood pressure is controlled will restart lisinopril tomorrow  Start librium po q 8 hr for withdrawal        ED Triage Vitals [08/09/21 0820]   Temperature Pulse Respirations Blood Pressure SpO2   97 9 °F (36 6 °C) (!) 111 16 160/97 97 %      Temp Source Heart Rate Source Patient Position - Orthostatic VS BP Location FiO2 (%)   Temporal Monitor Lying Right arm --      Pain Score       Worst Possible Pain          Wt Readings from Last 1 Encounters:   08/10/21 51 3 kg (113 lb)     Additional Vital Signs:   08/09/21 10:20:59  98 8 °F (37 1 °C)  110Abnormal   18  144/88  107  97 %  --  --   08/09/21 1000  --  94  16  145/88  --  98 %  --  Lying   08/09/21 0930  --  101  16  151/92  117  98 %  None (Room air)  Lying   08/09/21 0900  --  100  16  148/83  110  99 %  None (Room air)  Lying   08/09/21 0830  --  105  16  140/93  110  98 %         08/10/21 14:26:06  99 1 °F (37 3 °C)  97  16  121/82  95  97 %  --  --   08/10/21 1020  97 6 °F (36 4 °C)  72  18  130/80               Pertinent Labs/Diagnostic Test Results:   8/9  ekg -  Sinus tach   8/9  CXR - nad     8/9  US RUQ abd -    Moderate hepatic steatosis        Right renal cysts  8/9  cTAP -   Hepatic steatosis        Mild thickening of the ascending colon suggesting colitis        Scattered diverticuli without evidence for acute diverticulosis        Multiple right ovarian cysts measuring up to 2 1 x 1 7 cm         Results from last 7 days   Lab Units 08/09/21  0825   SARS-COV-2  Negative     Results from last 7 days   Lab Units 08/10/21  0620 08/09/21  0825   WBC Thousand/uL 3 29* 5 84   HEMOGLOBIN g/dL 12 6 14 3   HEMATOCRIT % 36 2 42 0   PLATELETS Thousands/uL 65* 106*   NEUTROS ABS Thousands/µL 1 84*  --    BANDS PCT %  --  1         Results from last 7 days   Lab Units 08/10/21  0948 08/10/21  0620 08/09/21  1149 08/09/21  0825   SODIUM mmol/L  --  133* 133* 131*   POTASSIUM mmol/L  --  3 4* 4 5 4 0   CHLORIDE mmol/L  --  97* 96* 93*   CO2 mmol/L  --  26 14* 12*   ANION GAP mmol/L  --  10 23* 26*   BUN mg/dL  --  3* 4* 6   CREATININE mg/dL  --  0 73 0 64 0 79   EGFR ml/min/1 73sq m  --  103 111 93   CALCIUM mg/dL  --  8 0* 6 8* 7 8*   MAGNESIUM mg/dL 1 6  --  1 0*  --    PHOSPHORUS mg/dL  --   --  2 3*  --      Results from last 7 days   Lab Units 08/10/21  0620 08/09/21  0825   AST U/L 125* 212*   ALT U/L 41 59   ALK PHOS U/L 109 138*   TOTAL PROTEIN g/dL 7 2 8 6*   ALBUMIN g/dL 3 3* 3 9   TOTAL BILIRUBIN mg/dL 1 76* 0 90         Results from last 7 days   Lab Units 08/10/21  0620 08/09/21  1149 08/09/21  0825   GLUCOSE RANDOM mg/dL 82 72 89     Results from last 7 days   Lab Units 08/10/21  0948   PROTIME seconds 13 5   INR  1 05     Results from last 7 days   Lab Units 08/09/21  1616 08/09/21  1322 08/09/21  1114 08/09/21  0825   LACTIC ACID mmol/L 2 0 2 8* 3 5* 5 3*     Results from last 7 days   Lab Units 08/10/21  0620 08/09/21  0825   LIPASE u/L 768* 1,002*     Results from last 7 days   Lab Units 08/09/21  0825   CRP mg/L <0 5         Results from last 7 days   Lab Units 08/09/21  0825   CLARITY UA  Cloudy   COLOR UA  Straw   SPEC GRAV UA  >=1 030   PH UA  5 5   GLUCOSE UA mg/dl Negative   KETONES UA mg/dl 40 (2+)*   BLOOD UA  Moderate*   PROTEIN UA mg/dl 100 (2+)*   NITRITE UA  Negative   BILIRUBIN UA  Small*   UROBILINOGEN UA E U /dl 1 0   LEUKOCYTES UA  Small*   WBC UA /hpf 10-20*   RBC UA /hpf 10-20*   BACTERIA UA /hpf Innumerable*   EPITHELIAL CELLS WET PREP /hpf Moderate*     Results from last 7 days   Lab Units 08/09/21  0925   ETHANOL LVL mg/dL 191* Results from last 7 days   Lab Units 08/09/21 0925 08/09/21  0825   BLOOD CULTURE  No Growth at 24 hrs  No Growth at 24 hrs   --    URINE CULTURE   --  Culture too young- will reincubate   ED Treatment:   Medication Administration from 08/09/2021 0817 to 08/09/2021 1021       Date/Time Order Dose Route Action     08/09/2021 0830 sodium chloride 0 9 % bolus 1,000 mL 1,000 mL Intravenous New Bag     08/09/2021 0829 ketorolac (TORADOL) injection 15 mg 15 mg Intravenous Given     08/09/2021 0926 sodium chloride 0 9 % bolus 1,000 mL 1,000 mL Intravenous New Bag     08/09/2021 0925 pantoprazole (PROTONIX) injection 40 mg 40 mg Intravenous Given     08/09/2021 0926 cefTRIAXone (ROCEPHIN) IVPB (premix in dextrose) 1,000 mg 50 mL 1,000 mg Intravenous New Bag          Past Medical History:   Diagnosis Date    Alcohol abuse     Asthma     Fatty liver     Hypertension     Seizure ([facility] [address] )     Tubal ligation status      Present on Admission:   Alcohol-induced acute pancreatitis without infection or necrosis   Cystitis   Hyponatremia   Alcohol abuse   Essential hypertension   Tobacco abuse      Admitting Diagnosis: Lactic acidosis [E87 2]  Alcohol abuse [F10 10]  Hyponatremia [E87 1]  UTI (urinary tract infection) [N39 0]  Elevated LFTs [R79 89]  Flu-like symptoms [R68 89]  Alcohol-induced acute pancreatitis without infection or necrosis [K85 20]  Age/Sex: 38 y o  female  Admission Orders:    NPO,  I/O,  Daily wgt; Ambulate q shift; lactic acid q 2 hrs until normalized; Seizure and aspiration precautions;   Serial assessment for s/s withdrawal ;  Continuous pulse oximetry;       Scheduled Medications:    cefTRIAXone, 1,000 mg, Intravenous, Q24H  chlordiazePOXIDE, 5 mg, Oral, Q8H [address]  nicotine, 1 patch, Transdermal, Daily  [START ON 8/11/2021] thiamine, 100 mg, Oral, Daily        8/9  @  1720 IV Mg sulfate   IV Folic acid daily :  Given 8/9 and 8/10  (then changed to po)     8/10  @  0037  IV Toradol  (dc'ed)   @ 0037   IV ativan 2 mg   @  1255  KDur 20 meq po  x1      Continuous IV Infusions:  lactated ringers, 75 mL/hr, Intravenous, Continuous      PRN Meds:  HYDROmorphone, 0 2 mg, Intravenous, Q4H PRN  LORazepam, 2 mg, Intravenous, Q6H PRN  ondansetron, 4 mg, Intravenous, Q6H PRN      Network Utilization Review Department  ATTENTION: Please call with any questions or concerns to [phone] and carefully listen to the prompts so that you are directed to the right person  All voicemails are confidential   Bruce Ramos all requests for admission clinical reviews, approved or denied determinations and any other requests to dedicated fax number below belonging to the campus where the patient is receiving treatment   List of dedicated fax numbers for the Facilities:  [address] DENIALS (Administrative/Medical Necessity) [phone]   [address] (Maternity/NICU/Pediatrics) [phone]   [address] [place] [address] [id number] [place] [address] [address] [facility] [phone]

## 2021-08-10 NOTE — ASSESSMENT & PLAN NOTE
How Severe Is Your Acne?: mild · Evidence with intractable vomiting improved lipase is down to 700 she denies any abdominal pain nausea vomiting advanced to GI low-fat  · Discussed regarding cessation of alcohol    · Ultrasound of the gallbladder no evidence of choledocholithiasis or cholecystitis Is This A New Presentation, Or A Follow-Up?: Acne

## 2021-08-10 NOTE — PLAN OF CARE
Problem: PAIN - ADULT  Goal: Verbalizes/displays adequate comfort level or baseline comfort level  Description: Interventions:  - Encourage patient to monitor pain and request assistance  - Assess pain using appropriate pain scale0-10  - Administer analgesics based on type and severity of pain and evaluate response  - Implement non-pharmacological measures as appropriate and evaluate response  - Consider cultural and social influences on pain and pain management  - Notify physician/advanced practitioner if interventions unsuccessful or patient reports new pain  Outcome: Progressing     Problem: INFECTION - ADULT  Goal: Absence or prevention of progression during hospitalization  Description: INTERVENTIONS:  - Assess and monitor for signs and symptoms of infection  - Monitor lab/diagnostic results  - Monitor all insertion sites, i e  indwelling lines, tubes, and drains  - Monitor endotracheal if appropriate and nasal secretions for changes in amount and color  - Paicines appropriate cooling/warming therapies per order  - Administer medications as ordered  - Instruct and encourage patient and family to use good hand hygiene technique  - Identify and instruct in appropriate isolation precautions for identified infection/condition  Outcome: Progressing  Goal: Absence of fever/infection during neutropenic period  Description: INTERVENTIONS:  - Monitor WBC    Outcome: Progressing

## 2021-08-10 NOTE — UTILIZATION REVIEW
Inpatient Admission Authorization Request   NOTIFICATION OF INPATIENT ADMISSION/INPATIENT AUTHORIZATION REQUEST   SERVICING FACILITY:   [address] [place] [address]  [address] Le[address], [address]  Tax ID: [id number]  NPI: [id number]  Place of Service: Inpatient [address] [facility]  [address]  Place of Service Code: 24     ATTENDING PROVIDER:  Attending Name and NPI#: Bijal Chapa [[id number]]  Address: Lata Leblanc [address], [address]  Phone: [phone]     UTILIZATION REVIEW CONTACT:  Iza Francisco, Utilization   Network Utilization Review Department  Phone: [phone]  Fax [phone]  Email: [email or web address]     PHYSICIAN ADVISORY SERVICES:  FOR [id number] REVIEW - MEDICAL NECESSITY DENIAL  Phone: [phone]  Fax: [phone]  Email: [email or web address]     TYPE OF REQUEST:  Inpatient Status     ADMISSION INFORMATION:  ADMISSION DATE/TIME: 8/9/21  9:22 AM  PATIENT DIAGNOSIS CODE/DESCRIPTION:  Lactic acidosis [E87 2]  Alcohol abuse [F10 10]  Hyponatremia [E87 1]  UTI (urinary tract infection) [N39 0]  Elevated LFTs [R79 89]  Flu-like symptoms [R68 89]  Alcohol-induced acute pancreatitis without infection or necrosis [K85 20]  DISCHARGE DATE/TIME: No discharge date for patient encounter  DISCHARGE DISPOSITION (IF DISCHARGED): Home/Self Care     IMPORTANT INFORMATION:  Please contact the Iza Francisco directly with any questions or concerns regarding this request  Department voicemails are confidential     Send requests for admission clinical reviews, concurrent reviews, approvals, and administrative denials due to lack of clinical to fax [phone]

## 2021-08-10 NOTE — PROGRESS NOTES
[address]  Progress Note - Sandeep Diaz 1980, 38 y o  female MRN: [id number]  Unit/Bed#: -01 Encounter: [id number]  Primary Care Provider: Ovi Leigh MD   Date and time admitted to hospital: 8/9/2021  8:17 AM    Thrombocytopenia ([address] )  Assessment & Plan  · History of but has been 130s 140s  Currently 65,000 trended down from admission  INR is normal no evidence or history of liver cirrhosis she does have hepatic steatosis  · Could be secondary to bone marrow dysfunction with alcohol abuse will monitor closely will get hemolysis smear she was not on heparin Lovenox within discontinue Lovenox discontinue Toradol  To avoid any increased chance of bleeding  · Get a fibrinogen level  · Folic acid and vitamin B12 all repeat a level tomorrow  Alcohol abuse  Assessment & Plan  Drinks vodka on regular basis, last drink last night  ETOH is 191  Patient has previously underwent withdrawal patient had last drink Sunday she is currently starting to have withdrawals with tremors in the upper extremities start Librium 5 mg p o  T i d  To avoid going further    Hyponatremia  Assessment & Plan  · Most likely secondary to vomiting secondary to alcohol abuse improved with IV hydration      Cystitis  Assessment & Plan  Continue IV ceftriaxone  Follow urine culture    Tobacco abuse  Assessment & Plan  Continue Nicoderm patch    Essential hypertension  Assessment & Plan  · Blood pressure is controlled will restart lisinopril tomorrow    High anion gap metabolic acidosis  Assessment & Plan  · Secondary to lactic acidosis and alcohol use has resolved  Decreased fluids starting diet    * Alcohol-induced acute pancreatitis without infection or necrosis  Assessment & Plan  · Evidence with intractable vomiting improved lipase is down to 700 she denies any abdominal pain nausea vomiting advanced to GI low-fat  · Discussed regarding cessation of alcohol    · Ultrasound of the gallbladder no evidence of choledocholithiasis or cholecystitis        VTE Pharmacologic Prophylaxis: VTE Score: 3 Moderate Risk (Score 3-4) - Pharmacological DVT Prophylaxis Contraindicated  Sequential Compression Devices Ordered  Patient Centered Rounds: I performed bedside rounds with nursing staff today  Discussions with Specialists or Other Care Team Provider: blanco    Education and Discussions with Family / Patient:  Patient    Time Spent for Care: 30 minutes  More than 50% of total time spent on counseling and coordination of care as described above  Current Length of Stay: 1 day(s)  Current Patient Status: Inpatient   Certification Statement: The patient will continue to require additional inpatient hospital stay due to Mild alcohol withdrawal with acute pancreatitis  Discharge Plan: Anticipate discharge tomorrow to home  Code Status: Level 1 - Full Code    Subjective:   Patient seen and examined denies any chest pain or shortness of breath denies any nausea vomiting or abdominal pain she is hungry    Objective:     Vitals:   Temp (24hrs), Av 5 °F (36 9 °C), Min:97 6 °F (36 4 °C), Max:99 3 °F (37 4 °C)    Temp:  [97 6 °F (36 4 °C)-99 3 °F (37 4 °C)] 97 6 °F (36 4 °C)  HR:  [] 72  Resp:  [17-22] 18  BP: (124-136)/(79-98) 130/80  SpO2:  [96 %-99 %] 97 %  Body mass index is 22 82 kg/m²  Input and Output Summary (last 24 hours): Intake/Output Summary (Last 24 hours) at 8/10/2021 1213  Last data filed at 8/10/2021 1025  Gross per 24 hour   Intake 2752 5 ml   Output 400 ml   Net 2352 5 ml       Physical Exam:   Physical Exam  Vitals and nursing note reviewed  Constitutional:       General: She is not in acute distress  Appearance: She is well-developed  HENT:      Head: Normocephalic and atraumatic  Eyes:      Conjunctiva/sclera: Conjunctivae normal    Cardiovascular:      Rate and Rhythm: Normal rate and regular rhythm  Heart sounds: No murmur heard       Pulmonary:      Effort: Pulmonary effort is normal  No respiratory distress  Breath sounds: Normal breath sounds  Abdominal:      General: There is no distension  Palpations: Abdomen is soft  Tenderness: There is no abdominal tenderness  Musculoskeletal:      Cervical back: Neck supple  Skin:     General: Skin is warm and dry  Neurological:      General: No focal deficit present  Mental Status: She is alert  Comments: Mild tremors of hands          Additional Data:     Labs:  Results from last 7 days   Lab Units 08/10/21  0620 08/09/21  0825   WBC Thousand/uL 3 29* 5 84   HEMOGLOBIN g/dL 12 6 14 3   HEMATOCRIT % 36 2 42 0   PLATELETS Thousands/uL 65* 106*   BANDS PCT %  --  1   NEUTROS PCT % 56  --    LYMPHS PCT % 33  --    LYMPHO PCT %  --  23   MONOS PCT % 9  --    MONO PCT %  --  7   EOS PCT % 1 3     Results from last 7 days   Lab Units 08/10/21  0620   SODIUM mmol/L 133*   POTASSIUM mmol/L 3 4*   CHLORIDE mmol/L 97*   CO2 mmol/L 26   BUN mg/dL 3*   CREATININE mg/dL 0 73   ANION GAP mmol/L 10   CALCIUM mg/dL 8 0*   ALBUMIN g/dL 3 3*   TOTAL BILIRUBIN mg/dL 1 76*   ALK PHOS U/L 109   ALT U/L 41   AST U/L 125*   GLUCOSE RANDOM mg/dL 82     Results from last 7 days   Lab Units 08/10/21  0948   INR  1 05             Results from last 7 days   Lab Units 08/09/21  1616 08/09/21  1322 08/09/21  1114 08/09/21  0825   LACTIC ACID mmol/L 2 0 2 8* 3 5* 5 3*       Lines/Drains:  Invasive Devices     Peripheral Intravenous Line            Peripheral IV 08/09/21 Left Antecubital 1 day                      Imaging: Reviewed radiology reports from this admission including: ultrasound(s)    Recent Cultures (last 7 days):   Results from last 7 days   Lab Units 08/09/21  0925 08/09/21  0825   BLOOD CULTURE  Received in Microbiology Lab  Culture in Progress  Received in Microbiology Lab  Culture in Progress    --    URINE CULTURE   --  Culture too young- will reincubate       Last 24 Hours Medication List:   Current Facility-Administered Medications   Medication Dose Route Frequency Provider Last Rate    cefTRIAXone  1,000 mg Intravenous Q24H Willy Covington MD 1,000 mg (08/10/21 1024)    chlordiazePOXIDE  5 mg Oral Q8H [facility] Dannielle Valenzuela MD      HYDROmorphone  0 2 mg Intravenous Q4H PRN Willy Covington MD      lactated ringers  75 mL/hr Intravenous Continuous Dannielle Valenzuela  mL/hr (08/10/21 1025)    LORazepam  2 mg Intravenous Q6H PRN Willy Covington MD      nicotine  1 patch Transdermal Daily Willy Covington MD      ondansetron  4 mg Intravenous Q6H PRN Willy Covington MD      potassium chloride  20 mEq Oral Once Dannielle Valenzuela MD     [facility] Smoker ON 8/11/2021] thiamine  100 mg Oral Daily Dannielle Valenzuela MD          Today, Patient Was Seen By: Dannielle Valenzuela MD    **Please Note: This note may have been constructed using a voice recognition system  **

## 2021-08-11 LAB
ALBUMIN SERPL BCP-MCNC: 3.2 G/DL (ref 3.5–5)
ALP SERPL-CCNC: 118 U/L (ref 46–116)
ALT SERPL W P-5'-P-CCNC: 46 U/L (ref 12–78)
ANION GAP SERPL CALCULATED.3IONS-SCNC: 11 MMOL/L (ref 4–13)
AST SERPL W P-5'-P-CCNC: 168 U/L (ref 5–45)
BASOPHILS # BLD AUTO: 0.02 THOUSANDS/ΜL (ref 0–0.1)
BASOPHILS NFR BLD AUTO: 1 % (ref 0–1)
BILIRUB SERPL-MCNC: 1.11 MG/DL (ref 0.2–1)
BUN SERPL-MCNC: 4 MG/DL (ref 5–25)
CALCIUM ALBUM COR SERPL-MCNC: 9.1 MG/DL (ref 8.3–10.1)
CALCIUM SERPL-MCNC: 8.5 MG/DL (ref 8.3–10.1)
CHLORIDE SERPL-SCNC: 98 MMOL/L (ref 100–108)
CO2 SERPL-SCNC: 28 MMOL/L (ref 21–32)
CREAT SERPL-MCNC: 0.54 MG/DL (ref 0.6–1.3)
EOSINOPHIL # BLD AUTO: 0.02 THOUSAND/ΜL (ref 0–0.61)
EOSINOPHIL NFR BLD AUTO: 1 % (ref 0–6)
ERYTHROCYTE [DISTWIDTH] IN BLOOD BY AUTOMATED COUNT: 11.4 % (ref 11.6–15.1)
FIBRINOGEN PPP-MCNC: 261 MG/DL (ref 227–495)
FOLATE SERPL-MCNC: 11.5 NG/ML (ref 3.1–17.5)
GFR SERPL CREATININE-BSD FRML MDRD: 118 ML/MIN/1.73SQ M
GLUCOSE SERPL-MCNC: 130 MG/DL (ref 65–140)
HCT VFR BLD AUTO: 33.4 % (ref 34.8–46.1)
HGB BLD-MCNC: 12 G/DL (ref 11.5–15.4)
IMM GRANULOCYTES # BLD AUTO: 0.01 THOUSAND/UL (ref 0–0.2)
IMM GRANULOCYTES NFR BLD AUTO: 0 % (ref 0–2)
LIPASE SERPL-CCNC: 854 U/L (ref 73–393)
LYMPHOCYTES # BLD AUTO: 1.01 THOUSANDS/ΜL (ref 0.6–4.47)
LYMPHOCYTES NFR BLD AUTO: 37 % (ref 14–44)
MCH RBC QN AUTO: 37.7 PG (ref 26.8–34.3)
MCHC RBC AUTO-ENTMCNC: 35.9 G/DL (ref 31.4–37.4)
MCV RBC AUTO: 105 FL (ref 82–98)
MONOCYTES # BLD AUTO: 0.28 THOUSAND/ΜL (ref 0.17–1.22)
MONOCYTES NFR BLD AUTO: 10 % (ref 4–12)
NEUTROPHILS # BLD AUTO: 1.37 THOUSANDS/ΜL (ref 1.85–7.62)
NEUTS SEG NFR BLD AUTO: 51 % (ref 43–75)
NRBC BLD AUTO-RTO: 0 /100 WBCS
PLATELET # BLD AUTO: 53 THOUSANDS/UL (ref 149–390)
PMV BLD AUTO: 9.5 FL (ref 8.9–12.7)
POTASSIUM SERPL-SCNC: 3.2 MMOL/L (ref 3.5–5.3)
PROT SERPL-MCNC: 7 G/DL (ref 6.4–8.2)
RBC # BLD AUTO: 3.18 MILLION/UL (ref 3.81–5.12)
SODIUM SERPL-SCNC: 137 MMOL/L (ref 136–145)
VIT B12 SERPL-MCNC: 570 PG/ML (ref 100–900)
WBC # BLD AUTO: 2.71 THOUSAND/UL (ref 4.31–10.16)

## 2021-08-11 PROCEDURE — 83690 ASSAY OF LIPASE: CPT | Performed by: FAMILY MEDICINE

## 2021-08-11 PROCEDURE — 85025 COMPLETE CBC W/AUTO DIFF WBC: CPT | Performed by: FAMILY MEDICINE

## 2021-08-11 PROCEDURE — 80053 COMPREHEN METABOLIC PANEL: CPT | Performed by: FAMILY MEDICINE

## 2021-08-11 PROCEDURE — 82607 VITAMIN B-12: CPT | Performed by: FAMILY MEDICINE

## 2021-08-11 PROCEDURE — 99232 SBSQ HOSP IP/OBS MODERATE 35: CPT | Performed by: FAMILY MEDICINE

## 2021-08-11 PROCEDURE — 82746 ASSAY OF FOLIC ACID SERUM: CPT | Performed by: FAMILY MEDICINE

## 2021-08-11 RX ORDER — POTASSIUM CHLORIDE 20 MEQ/1
40 TABLET, EXTENDED RELEASE ORAL ONCE
Status: COMPLETED | OUTPATIENT
Start: 2021-08-11 | End: 2021-08-11

## 2021-08-11 RX ORDER — CEPHALEXIN 500 MG/1
500 CAPSULE ORAL EVERY 6 HOURS SCHEDULED
Status: DISCONTINUED | OUTPATIENT
Start: 2021-08-11 | End: 2021-08-12 | Stop reason: HOSPADM

## 2021-08-11 RX ORDER — TRAMADOL HYDROCHLORIDE 50 MG/1
25 TABLET ORAL EVERY 8 HOURS PRN
Status: DISCONTINUED | OUTPATIENT
Start: 2021-08-11 | End: 2021-08-12 | Stop reason: HOSPADM

## 2021-08-11 RX ORDER — LISINOPRIL 5 MG/1
5 TABLET ORAL DAILY
Status: DISCONTINUED | OUTPATIENT
Start: 2021-08-12 | End: 2021-08-12 | Stop reason: HOSPADM

## 2021-08-11 RX ORDER — QUETIAPINE FUMARATE 25 MG/1
50 TABLET, FILM COATED ORAL ONCE
Status: COMPLETED | OUTPATIENT
Start: 2021-08-11 | End: 2021-08-11

## 2021-08-11 RX ADMIN — CEPHALEXIN 500 MG: 500 CAPSULE ORAL at 09:07

## 2021-08-11 RX ADMIN — LORAZEPAM 2 MG: 2 INJECTION INTRAMUSCULAR; INTRAVENOUS at 04:34

## 2021-08-11 RX ADMIN — CEPHALEXIN 500 MG: 500 CAPSULE ORAL at 14:02

## 2021-08-11 RX ADMIN — CHLORDIAZEPOXIDE HYDROCHLORIDE 5 MG: 5 CAPSULE ORAL at 21:42

## 2021-08-11 RX ADMIN — POTASSIUM CHLORIDE 40 MEQ: 1500 TABLET, EXTENDED RELEASE ORAL at 14:14

## 2021-08-11 RX ADMIN — QUETIAPINE FUMARATE 50 MG: 25 TABLET ORAL at 22:46

## 2021-08-11 RX ADMIN — TRAMADOL HYDROCHLORIDE 25 MG: 50 TABLET, FILM COATED ORAL at 14:14

## 2021-08-11 RX ADMIN — THIAMINE HCL TAB 100 MG 100 MG: 100 TAB at 09:07

## 2021-08-11 RX ADMIN — TRAMADOL HYDROCHLORIDE 25 MG: 50 TABLET, FILM COATED ORAL at 22:19

## 2021-08-11 RX ADMIN — CHLORDIAZEPOXIDE HYDROCHLORIDE 5 MG: 5 CAPSULE ORAL at 13:13

## 2021-08-11 RX ADMIN — CHLORDIAZEPOXIDE HYDROCHLORIDE 5 MG: 5 CAPSULE ORAL at 05:29

## 2021-08-11 RX ADMIN — CEPHALEXIN 500 MG: 500 CAPSULE ORAL at 21:42

## 2021-08-11 NOTE — PROGRESS NOTES
[address]  Progress Note - Mitch Goncalves 1980, 38 y o  female MRN: [id number]  Unit/Bed#: -01 Encounter: [id number]  Primary Care Provider: Evan Price MD   Date and time admitted to hospital: 8/9/2021  8:17 AM    Thrombocytopenia ([address] )  Assessment & Plan  · History of but has been 130s 140s  Currently 65,000 trended down from admission  INR is normal no evidence or history of liver cirrhosis she does have hepatic steatosis  · Could be secondary to bone marrow dysfunction with alcohol abuse will monitor closely will get hemolysis smear she was not on heparin Lovenox within discontinue Lovenox discontinue Toradol  To avoid any increased chance of bleeding  · Hemolysis smear is negative vitamin W79 folic acid are normal trended down today along with leukopenia will re-evaluate tomorrow insure it starts to trend up prior to discharge  · Fibrinogen is normal    Alcohol abuse  Assessment & Plan  Drinks vodka on regular basis, last drink last night  ETOH is 191  Patient has previously underwent withdrawal patient had last drink Sunday she is currently starting to have withdrawals with tremors in the upper extremities start Librium 5 mg p o  T i d  To avoid going further improving  Will start tapering down tomorrow    Hyponatremia  Assessment & Plan  · Most likely secondary to vomiting secondary to alcohol abuse resolved with p o  Intake and IV fluids  Cystitis  Assessment & Plan  Switched to Keflex 500 mg q 6 hours for 5 days  Polymicrobial but both sensitive to Ancef    Transaminitis  Assessment & Plan  · With AST greater than ALT suspect alcoholic hepatitis    Will re-evaluate tomorrow as it did slightly go up from yesterday with a T bili improved    Tobacco abuse  Assessment & Plan  Continue Nicoderm patch    Essential hypertension  Assessment & Plan  · Blood pressure is starting to climb up restart lisinopril 5 mg daily    High anion gap metabolic acidosis  Assessment & Plan  · Secondary to lactic acidosis and alcohol use has resolved      * Alcohol-induced acute pancreatitis without infection or necrosis  Assessment & Plan  · Lipase went up to 900 today the patient has no pain tolerating her diet clinically no evidence  Advanced diet recheck lipase tomorrow and monitor  · Discussed regarding cessation of alcohol  · Ultrasound of the gallbladder no evidence of choledocholithiasis or cholecystitis          VTE Pharmacologic Prophylaxis: VTE Score: 3 Moderate Risk (Score 3-4) - Pharmacological DVT Prophylaxis Contraindicated  Sequential Compression Devices Ordered  Patient Centered Rounds: I performed bedside rounds with nursing staff today  Discussions with Specialists or Other Care Team Provider:  Discussed with case management    Education and Discussions with Family / Patient:  Patient    Time Spent for Care: 30 minutes  More than 50% of total time spent on counseling and coordination of care as described above  Current Length of Stay: 2 day(s)  Current Patient Status: Inpatient   Certification Statement: The patient will continue to require additional inpatient hospital stay due to Secondary to thrombocytopenia  Discharge Plan: Anticipate discharge tomorrow to home  Code Status: Level 1 - Full Code    Subjective:   Patient seen and examined denies any chest pain or shortness of breath and denies any abdominal pain nausea vomiting tolerating diet    Objective:     Vitals:   Temp (24hrs), Av 2 °F (36 8 °C), Min:97 1 °F (36 2 °C), Max:99 1 °F (37 3 °C)    Temp:  [97 1 °F (36 2 °C)-99 1 °F (37 3 °C)] 97 1 °F (36 2 °C)  HR:  [] 109  Resp:  [16-21] 20  BP: (121-134)/() 134/101  SpO2:  [96 %-99 %] 96 %  Body mass index is 22 62 kg/m²  Input and Output Summary (last 24 hours):      Intake/Output Summary (Last 24 hours) at 2021 1404  Last data filed at 2021 1313  Gross per 24 hour   Intake 1905 ml   Output --   Net 1905 ml Physical Exam:   Physical Exam  Vitals and nursing note reviewed  Constitutional:       General: She is not in acute distress  Appearance: She is well-developed  HENT:      Head: Normocephalic and atraumatic  Eyes:      Conjunctiva/sclera: Conjunctivae normal    Cardiovascular:      Rate and Rhythm: Normal rate and regular rhythm  Heart sounds: No murmur heard  Pulmonary:      Effort: Pulmonary effort is normal  No respiratory distress  Breath sounds: Normal breath sounds  Abdominal:      General: There is no distension  Palpations: Abdomen is soft  Tenderness: There is no abdominal tenderness  Musculoskeletal:         General: No swelling  Cervical back: Neck supple  Skin:     General: Skin is warm and dry  Neurological:      General: No focal deficit present  Mental Status: She is alert and oriented to person, place, and time  Mental status is at baseline     Psychiatric:         Mood and Affect: Mood normal          Additional Data:     Labs:  Results from last 7 days   Lab Units 08/11/21  0510 08/09/21  0825   WBC Thousand/uL 2 71* 5 84   HEMOGLOBIN g/dL 12 0 14 3   HEMATOCRIT % 33 4* 42 0   PLATELETS Thousands/uL 53* 106*   BANDS PCT %  --  1   NEUTROS PCT % 51  --    LYMPHS PCT % 37  --    LYMPHO PCT %  --  23   MONOS PCT % 10  --    MONO PCT %  --  7   EOS PCT % 1 3     Results from last 7 days   Lab Units 08/11/21  0510   SODIUM mmol/L 137   POTASSIUM mmol/L 3 2*   CHLORIDE mmol/L 98*   CO2 mmol/L 28   BUN mg/dL 4*   CREATININE mg/dL 0 54*   ANION GAP mmol/L 11   CALCIUM mg/dL 8 5   ALBUMIN g/dL 3 2*   TOTAL BILIRUBIN mg/dL 1 11*   ALK PHOS U/L 118*   ALT U/L 46   AST U/L 168*   GLUCOSE RANDOM mg/dL 130     Results from last 7 days   Lab Units 08/10/21  0948   INR  1 05             Results from last 7 days   Lab Units 08/09/21  1616 08/09/21  1322 08/09/21  1114 08/09/21  0825   LACTIC ACID mmol/L 2 0 2 8* 3 5* 5 3*       Lines/Drains:  Invasive Devices     None                       Imaging: Reviewed radiology reports from this admission including: abdominal/pelvic CT    Recent Cultures (last 7 days):   Results from last 7 days   Lab Units 08/09/21  0925 08/09/21  0825   BLOOD CULTURE  No Growth at 48 hrs  No Growth at 48 hrs  --    URINE CULTURE   --  50,000-59,000 cfu/ml Escherichia coli*  30,000-39,000 cfu/ml Enterococcus species*  30,000-39,000 cfu/ml Proteus mirabilis*       Last 24 Hours Medication List:   Current Facility-Administered Medications   Medication Dose Route Frequency Provider Last Rate    cephalexin  500 mg Oral Q6H [facility] Ariela Jay MD      chlordiazePOXIDE  5 mg Oral [facility] 8/12/[facility]] lisinopril  5 mg Oral Daily Ariela Jay MD      nicotine  1 patch Transdermal Daily Tootie Craig MD      potassium chloride  40 mEq Oral Once Ariela Jay MD      thiamine  100 mg Oral Daily Ariela Jay MD      traMADol  25 mg Oral Q8H PRN Ariela Jay MD          Today, Patient Was Seen By: Ariela Jay MD    **Please Note: This note may have been constructed using a voice recognition system  **

## 2021-08-11 NOTE — ASSESSMENT & PLAN NOTE
· Lipase went up to 900 today the patient has no pain tolerating her diet clinically no evidence  Advanced diet recheck lipase tomorrow and monitor  · Discussed regarding cessation of alcohol    · Ultrasound of the gallbladder no evidence of choledocholithiasis or cholecystitis

## 2021-08-11 NOTE — PLAN OF CARE
Problem: INFECTION - ADULT  Goal: Absence or prevention of progression during hospitalization  Description: INTERVENTIONS:  - Assess and monitor for signs and symptoms of infection  - Monitor lab/diagnostic results  - Monitor all insertion sites, i e  indwelling lines, tubes, and drains  - Monitor endotracheal if appropriate and nasal secretions for changes in amount and color  - [place] appropriate cooling/warming therapies per order  - Administer medications as ordered  - Instruct and encourage patient and family to use good hand hygiene technique  - Identify and instruct in appropriate isolation precautions for identified infection/condition  Outcome: Progressing  Goal: Absence of fever/infection during neutropenic period  Description: INTERVENTIONS:  - Monitor WBC    Outcome: Progressing     Problem: SAFETY ADULT  Goal: Patient will remain free of falls  Description: INTERVENTIONS:  - Educate patient/family on patient safety including physical limitations  - Instruct patient to call for assistance with activity   - Consult OT/PT to assist with strengthening/mobility   - Keep Call bell within reach  - Keep bed low and locked with side rails adjusted as appropriate  - Keep care items and personal belongings within reach  - Initiate and maintain comfort rounds  - Make Fall Risk Sign visible to staff  - Offer Toileting every 2 Hours, in advance of need  - Initiate/Maintain bedalarm  - Obtain necessary fall risk management equipment: walker  - Apply yellow socks and bracelet for high fall risk patients  - Consider moving patient to room near nurses station  Outcome: Progressing  Goal: Maintain or return to baseline ADL function  Description: INTERVENTIONS:  -  Assess patient's ability to carry out ADLs; assess patient's baseline for ADL function and identify physical deficits which impact ability to perform ADLs (bathing, care of mouth/teeth, toileting, grooming, dressing, etc )  - Assess/evaluate cause of self-care deficits   - Assess range of motion  - Assess patient's mobility; develop plan if impaired  - Assess patient's need for assistive devices and provide as appropriate  - Encourage maximum independence but intervene and supervise when necessary  - Involve family in performance of ADLs  - Assess for home care needs following discharge   - Consider OT consult to assist with ADL evaluation and planning for discharge  - Provide patient education as appropriate  Outcome: Progressing  Goal: Maintains/Returns to pre admission functional level  Description: INTERVENTIONS:  - Perform BMAT or MOVE assessment daily    - Set and communicate daily mobility goal to care team and patient/family/caregiver  - Collaborate with rehabilitation services on mobility goals if consulted  - Perform Range of Motion 3times a day  - Reposition patient every 2 hours    - Dangle patient3 times a day  - Stand patient 3times a day  - Ambulate patient3 times a day  - Out of bed to chair 3 times a day   - Out of bed for meals3 times a day  - Out of bed for toileting  - Record patient progress and toleration of activity level   Outcome: Progressing     Problem: DISCHARGE PLANNING  Goal: Discharge to home or other facility with appropriate resources  Description: INTERVENTIONS:  - Identify barriers to discharge w/patient and caregiver  - Arrange for needed discharge resources and transportation as appropriate  - Identify discharge learning needs (meds, wound care, etc )  - Arrange for interpretive services to assist at discharge as needed  - Refer to Case Management Department for coordinating discharge planning if the patient needs post-hospital services based on physician/advanced practitioner order or complex needs related to functional status, cognitive ability, or social support system  Outcome: Progressing     Problem: Knowledge Deficit  Goal: Patient/family/caregiver demonstrates understanding of disease process, treatment plan, medications, and discharge instructions  Description: Complete learning assessment and assess knowledge base    Interventions:  - Provide teaching at level of understanding  - Provide teaching via preferred learning methods  Outcome: Progressing     Problem: GASTROINTESTINAL - ADULT  Goal: Minimal or absence of nausea and/or vomiting  Description: INTERVENTIONS:  - Administer IV fluids if ordered to ensure adequate hydration  - Maintain NPO status until nausea and vomiting are resolved  - Nasogastric tube if ordered  - Administer ordered antiemetic medications as needed  - Provide nonpharmacologic comfort measures as appropriate  - Advance diet as tolerated, if ordered  - Consider nutrition services referral to assist patient with adequate nutrition and appropriate food choices  Outcome: Progressing  Goal: Maintains or returns to baseline bowel function  Description: INTERVENTIONS:  - Assess bowel function  - Encourage oral fluids to ensure adequate hydration  - Administer IV fluids if ordered to ensure adequate hydration  - Administer ordered medications as needed  - Encourage mobilization and activity  - Consider nutritional services referral to assist patient with adequate nutrition and appropriate food choices  Outcome: Progressing  Goal: Maintains adequate nutritional intake  Description: INTERVENTIONS:  - Monitor percentage of each meal consumed  - Identify factors contributing to decreased intake, treat as appropriate  - Assist with meals as needed  - Monitor I&O, weight, and lab values if indicated  - Obtain nutrition services referral as needed  Outcome: Progressing  Goal: Oral mucous membranes remain intact  Description: INTERVENTIONS  - Assess oral mucosa and hygiene practices  - Implement preventative oral hygiene regimen  - Implement oral medicated treatments as ordered  - Initiate Nutrition services referral as needed  Outcome: Progressing     Problem: RESPIRATORY - ADULT  Goal: Achieves optimal ventilation and oxygenation  Description: INTERVENTIONS:  - Assess for changes in respiratory status  - Assess for changes in mentation and behavior  - Position to facilitate oxygenation and minimize respiratory effort  - Oxygen administered by appropriate delivery if ordered  - Initiate smoking cessation education as indicated  - Encourage broncho-pulmonary hygiene including cough, deep breathe, Incentive Spirometry  - Assess the need for suctioning and aspirate as needed  - Assess and instruct to report SOB or any respiratory difficulty  - Respiratory Therapy support as indicated  Outcome: Progressing

## 2021-08-11 NOTE — ASSESSMENT & PLAN NOTE
Drinks vodka on regular basis, last drink last night  ETOH is 191  Patient has previously underwent withdrawal patient had last drink Sunday she is currently starting to have withdrawals with tremors in the upper extremities start Librium 5 mg p o  T i d  To avoid going further improving    Will start tapering down tomorrow

## 2021-08-11 NOTE — ASSESSMENT & PLAN NOTE
· Most likely secondary to vomiting secondary to alcohol abuse resolved with p o  Intake and IV fluids

## 2021-08-11 NOTE — ASSESSMENT & PLAN NOTE
· History of but has been 130s 140s  Currently 65,000 trended down from admission  INR is normal no evidence or history of liver cirrhosis she does have hepatic steatosis  · Could be secondary to bone marrow dysfunction with alcohol abuse will monitor closely will get hemolysis smear she was not on heparin Lovenox within discontinue Lovenox discontinue Toradol    To avoid any increased chance of bleeding  · Hemolysis smear is negative vitamin Q87 folic acid are normal trended down today along with leukopenia will re-evaluate tomorrow insure it starts to trend up prior to discharge  · Fibrinogen is normal

## 2021-08-11 NOTE — PLAN OF CARE
Problem: PAIN - ADULT  Goal: Verbalizes/displays adequate comfort level or baseline comfort level  Description: Interventions:  - Encourage patient to monitor pain and request assistance  - Assess pain using appropriate pain scale0-10  - Administer analgesics based on type and severity of pain and evaluate response  - Implement non-pharmacological measures as appropriate and evaluate response  - Consider cultural and social influences on pain and pain management  - Notify physician/advanced practitioner if interventions unsuccessful or patient reports new pain  Outcome: Progressing     Problem: INFECTION - ADULT  Goal: Absence or prevention of progression during hospitalization  Description: INTERVENTIONS:  - Assess and monitor for signs and symptoms of infection  - Monitor lab/diagnostic results  - Monitor all insertion sites, i e  indwelling lines, tubes, and drains  - Monitor endotracheal if appropriate and nasal secretions for changes in amount and color  - [place] appropriate cooling/warming therapies per order  - Administer medications as ordered  - Instruct and encourage patient and family to use good hand hygiene technique  - Identify and instruct in appropriate isolation precautions for identified infection/condition  Outcome: Progressing     Problem: SAFETY ADULT  Goal: Patient will remain free of falls  Description: INTERVENTIONS:  - Educate patient/family on patient safety including physical limitations  - Instruct patient to call for assistance with activity   - Consult OT/PT to assist with strengthening/mobility   - Keep Call bell within reach  - Keep bed low and locked with side rails adjusted as appropriate  - Keep care items and personal belongings within reach  - Initiate and maintain comfort rounds  - Make Fall Risk Sign visible to staff  -  - Apply yellow socks and bracelet for high fall risk patients  - Consider moving patient to room near nurses station  Outcome: Progressing  Goal: Maintain or return to baseline ADL function  Description: INTERVENTIONS:  -  Assess patient's ability to carry out ADLs; assess patient's baseline for ADL function and identify physical deficits which impact ability to perform ADLs (bathing, care of mouth/teeth, toileting, grooming, dressing, etc )  - Assess/evaluate cause of self-care deficits   - Assess range of motion  - Assess patient's mobility; develop plan if impaired  - Assess patient's need for assistive devices and provide as appropriate  - Encourage maximum independence but intervene and supervise when necessary  - Involve family in performance of ADLs  - Assess for home care needs following discharge   - Consider OT consult to assist with ADL evaluation and planning for discharge  - Provide patient education as appropriate  Outcome: Progressing  Goal: Maintains/Returns to pre admission functional level  Description: INTERVENTIONS:  - Perform BMAT or MOVE assessment daily    - Set and communicate daily mobility goal to care team and patient/family/caregiver     - Collaborate with rehabilitation services on mobility goals if consulted    - Out of bed for toileting  - Record patient progress and toleration of activity level   Outcome: Progressing     Problem: DISCHARGE PLANNING  Goal: Discharge to home or other facility with appropriate resources  Description: INTERVENTIONS:  - Identify barriers to discharge w/patient and caregiver  - Arrange for needed discharge resources and transportation as appropriate  - Identify discharge learning needs (meds, wound care, etc )  - Arrange for interpretive services to assist at discharge as needed  - Refer to Case Management Department for coordinating discharge planning if the patient needs post-hospital services based on physician/advanced practitioner order or complex needs related to functional status, cognitive ability, or social support system  Outcome: Progressing     Problem: Knowledge Deficit  Goal: Patient/family/caregiver demonstrates understanding of disease process, treatment plan, medications, and discharge instructions  Description: Complete learning assessment and assess knowledge base    Interventions:  - Provide teaching at level of understanding  - Provide teaching via preferred learning methods  Outcome: Progressing     Problem: RESPIRATORY - ADULT  Goal: Achieves optimal ventilation and oxygenation  Description: INTERVENTIONS:  - Assess for changes in respiratory status  - Assess for changes in mentation and behavior  - Position to facilitate oxygenation and minimize respiratory effort  - Oxygen administered by appropriate delivery if ordered  - Initiate smoking cessation education as indicated  - Encourage broncho-pulmonary hygiene including cough, deep breathe, Incentive Spirometry  - Assess the need for suctioning and aspirate as needed  - Assess and instruct to report SOB or any respiratory difficulty  - Respiratory Therapy support as indicated  Outcome: Progressing     Problem: GASTROINTESTINAL - ADULT  Goal: Minimal or absence of nausea and/or vomiting  Description: INTERVENTIONS:  - Administer IV fluids if ordered to ensure adequate hydration  - Maintain NPO status until nausea and vomiting are resolved  - Nasogastric tube if ordered  - Administer ordered antiemetic medications as needed  - Provide nonpharmacologic comfort measures as appropriate  - Advance diet as tolerated, if ordered  - Consider nutrition services referral to assist patient with adequate nutrition and appropriate food choices  Outcome: Progressing  Goal: Maintains or returns to baseline bowel function  Description: INTERVENTIONS:  - Assess bowel function  - Encourage oral fluids to ensure adequate hydration  - Administer IV fluids if ordered to ensure adequate hydration  - Administer ordered medications as needed  - Encourage mobilization and activity  - Consider nutritional services referral to assist patient with adequate nutrition and appropriate food choices  Outcome: Progressing  Goal: Maintains adequate nutritional intake  Description: INTERVENTIONS:  - Monitor percentage of each meal consumed  - Identify factors contributing to decreased intake, treat as appropriate  - Assist with meals as needed  - Monitor I&O, weight, and lab values if indicated  - Obtain nutrition services referral as needed  Outcome: Progressing  Goal: Oral mucous membranes remain intact  Description: INTERVENTIONS  - Assess oral mucosa and hygiene practices  - Implement preventative oral hygiene regimen  - Implement oral medicated treatments as ordered  - Initiate Nutrition services referral as needed  Outcome: Progressing     Problem: METABOLIC, FLUID AND ELECTROLYTES - ADULT  Goal: Electrolytes maintained within normal limits  Description: INTERVENTIONS:  - Monitor labs and assess patient for signs and symptoms of electrolyte imbalances  - Administer electrolyte replacement as ordered  - Monitor response to electrolyte replacements, including repeat lab results as appropriate  - Instruct patient on fluid and nutrition as appropriate  Outcome: Progressing  Goal: Fluid balance maintained  Description: INTERVENTIONS:  - Monitor labs   - Monitor I/O and WT  - Instruct patient on fluid and nutrition as appropriate  - Assess for signs & symptoms of volume excess or deficit  Outcome: Progressing  Goal: Glucose maintained within target range  Description: INTERVENTIONS:  - Monitor Blood Glucose as ordered  - Assess for signs and symptoms of hyperglycemia and hypoglycemia  - Administer ordered medications to maintain glucose within target range  - Assess nutritional intake and initiate nutrition service referral as needed  Outcome: Progressing     Problem: Potential for Falls  Goal: Patient will remain free of falls  Description: INTERVENTIONS:  - Educate patient/family on patient safety including physical limitations  - Instruct patient to call for assistance with activity   - Consult OT/PT to assist with strengthening/mobility   - Keep Call bell within reach  - Keep bed low and locked with side rails adjusted as appropriate  - Keep care items and personal belongings within reach  - Initiate and maintain comfort rounds  - Make Fall Risk Sign visible to staff  -  - Apply yellow socks and bracelet for high fall risk patients  - Consider moving patient to room near nurses station  Outcome: Progressing

## 2021-08-11 NOTE — ASSESSMENT & PLAN NOTE
· With AST greater than ALT suspect alcoholic hepatitis    Will re-evaluate tomorrow as it did slightly go up from yesterday with a T bili improved

## 2021-08-12 VITALS
BODY MASS INDEX: 22.38 KG/M2 | OXYGEN SATURATION: 97 % | SYSTOLIC BLOOD PRESSURE: 121 MMHG | WEIGHT: 111 LBS | HEART RATE: 84 BPM | HEIGHT: 59 IN | RESPIRATION RATE: 19 BRPM | TEMPERATURE: 98.5 F | DIASTOLIC BLOOD PRESSURE: 84 MMHG

## 2021-08-12 LAB
ALBUMIN SERPL BCP-MCNC: 3.3 G/DL (ref 3.5–5)
ALP SERPL-CCNC: 117 U/L (ref 46–116)
ALT SERPL W P-5'-P-CCNC: 69 U/L (ref 12–78)
ANION GAP SERPL CALCULATED.3IONS-SCNC: 11 MMOL/L (ref 4–13)
AST SERPL W P-5'-P-CCNC: 205 U/L (ref 5–45)
BASOPHILS # BLD AUTO: 0.04 THOUSANDS/ΜL (ref 0–0.1)
BASOPHILS NFR BLD AUTO: 1 % (ref 0–1)
BILIRUB SERPL-MCNC: 0.93 MG/DL (ref 0.2–1)
BUN SERPL-MCNC: 7 MG/DL (ref 5–25)
CALCIUM ALBUM COR SERPL-MCNC: 9.4 MG/DL (ref 8.3–10.1)
CALCIUM SERPL-MCNC: 8.8 MG/DL (ref 8.3–10.1)
CHLORIDE SERPL-SCNC: 99 MMOL/L (ref 100–108)
CHOLEST SERPL-MCNC: 247 MG/DL (ref 50–200)
CO2 SERPL-SCNC: 26 MMOL/L (ref 21–32)
CREAT SERPL-MCNC: 0.54 MG/DL (ref 0.6–1.3)
EOSINOPHIL # BLD AUTO: 0.03 THOUSAND/ΜL (ref 0–0.61)
EOSINOPHIL NFR BLD AUTO: 1 % (ref 0–6)
ERYTHROCYTE [DISTWIDTH] IN BLOOD BY AUTOMATED COUNT: 11.5 % (ref 11.6–15.1)
GFR SERPL CREATININE-BSD FRML MDRD: 118 ML/MIN/1.73SQ M
GLUCOSE SERPL-MCNC: 119 MG/DL (ref 65–140)
HCT VFR BLD AUTO: 35.1 % (ref 34.8–46.1)
HDLC SERPL-MCNC: 110 MG/DL
HGB BLD-MCNC: 12.9 G/DL (ref 11.5–15.4)
IMM GRANULOCYTES # BLD AUTO: 0.02 THOUSAND/UL (ref 0–0.2)
IMM GRANULOCYTES NFR BLD AUTO: 1 % (ref 0–2)
LDLC SERPL CALC-MCNC: 120 MG/DL (ref 0–100)
LIPASE SERPL-CCNC: 735 U/L (ref 73–393)
LYMPHOCYTES # BLD AUTO: 1.83 THOUSANDS/ΜL (ref 0.6–4.47)
LYMPHOCYTES NFR BLD AUTO: 45 % (ref 14–44)
MCH RBC QN AUTO: 38.6 PG (ref 26.8–34.3)
MCHC RBC AUTO-ENTMCNC: 36.8 G/DL (ref 31.4–37.4)
MCV RBC AUTO: 105 FL (ref 82–98)
MONOCYTES # BLD AUTO: 0.33 THOUSAND/ΜL (ref 0.17–1.22)
MONOCYTES NFR BLD AUTO: 8 % (ref 4–12)
NEUTROPHILS # BLD AUTO: 1.79 THOUSANDS/ΜL (ref 1.85–7.62)
NEUTS SEG NFR BLD AUTO: 44 % (ref 43–75)
NONHDLC SERPL-MCNC: 137 MG/DL
NRBC BLD AUTO-RTO: 0 /100 WBCS
PLATELET # BLD AUTO: 90 THOUSANDS/UL (ref 149–390)
PMV BLD AUTO: 10.4 FL (ref 8.9–12.7)
POTASSIUM SERPL-SCNC: 3.6 MMOL/L (ref 3.5–5.3)
PROT SERPL-MCNC: 7.4 G/DL (ref 6.4–8.2)
RBC # BLD AUTO: 3.34 MILLION/UL (ref 3.81–5.12)
SODIUM SERPL-SCNC: 136 MMOL/L (ref 136–145)
TRIGL SERPL-MCNC: 85 MG/DL
WBC # BLD AUTO: 4.04 THOUSAND/UL (ref 4.31–10.16)

## 2021-08-12 PROCEDURE — 99239 HOSP IP/OBS DSCHRG MGMT >30: CPT | Performed by: FAMILY MEDICINE

## 2021-08-12 PROCEDURE — 80061 LIPID PANEL: CPT | Performed by: FAMILY MEDICINE

## 2021-08-12 PROCEDURE — 80053 COMPREHEN METABOLIC PANEL: CPT | Performed by: FAMILY MEDICINE

## 2021-08-12 PROCEDURE — 85025 COMPLETE CBC W/AUTO DIFF WBC: CPT | Performed by: FAMILY MEDICINE

## 2021-08-12 PROCEDURE — 83690 ASSAY OF LIPASE: CPT | Performed by: FAMILY MEDICINE

## 2021-08-12 RX ORDER — CEPHALEXIN 500 MG/1
500 CAPSULE ORAL EVERY 6 HOURS SCHEDULED
Qty: 15 CAPSULE | Refills: 0 | Status: SHIPPED | OUTPATIENT
Start: 2021-08-12 | End: 2021-08-16

## 2021-08-12 RX ORDER — CHLORDIAZEPOXIDE HYDROCHLORIDE 5 MG/1
CAPSULE, GELATIN COATED ORAL
Qty: 6 CAPSULE | Refills: 0 | Status: SHIPPED | OUTPATIENT
Start: 2021-08-12 | End: 2021-08-16

## 2021-08-12 RX ADMIN — CEPHALEXIN 500 MG: 500 CAPSULE ORAL at 03:29

## 2021-08-12 RX ADMIN — CHLORDIAZEPOXIDE HYDROCHLORIDE 5 MG: 5 CAPSULE ORAL at 05:16

## 2021-08-12 RX ADMIN — CEPHALEXIN 500 MG: 500 CAPSULE ORAL at 08:27

## 2021-08-12 RX ADMIN — LISINOPRIL 5 MG: 5 TABLET ORAL at 08:28

## 2021-08-12 RX ADMIN — THIAMINE HCL TAB 100 MG 100 MG: 100 TAB at 08:27

## 2021-08-12 NOTE — ASSESSMENT & PLAN NOTE
· Lipase down to 700 stopping tolerating diet discussed with her to avoid drinking alcohol  · Discussed regarding cessation of alcohol    · Ultrasound of the gallbladder no evidence of choledocholithiasis or cholecystitis [FreeTextEntry1] : 72-year-old female with h/o DM, hypertension, and hyperlipidemia. h/o CAD?\par \par Seen in [facility] ED on 11/19/20 for atypical chest pain, similar complaints noted on previous visits. Also had c/o abdominal discomfort and change in bowel movements, diagnosed with diverticulitis and subsequently discharged on antibiotics. It is unclear why patient was referred for f/u with cardiology.\par \par

## 2021-08-12 NOTE — CASE MANAGEMENT
Case Management Progress Note    Patient name Dary Gonsalves  Location [place] [id number] MRN [id number]  : 1980 Date 2021       LOS (days): 3  Geometric Mean LOS (GMLOS) (days):   Days to GMLOS:        PROGRESS NOTE:      CM made a follow up appointment with pts PCP on 21 at 1100    AVS updated

## 2021-08-12 NOTE — ASSESSMENT & PLAN NOTE
Drinks vodka on regular basis, last drink last night  ETOH is 191  Patient has previously underwent withdrawal patient had last drink Sunday she is currently starting to have withdrawals with tremors in the upper extremities start Librium 5 mg p o  T i d  To avoid going further improving  Will discharge on Librium 5 mg b i d  For 2 days and then 5 mg daily for 2 days

## 2021-08-12 NOTE — ASSESSMENT & PLAN NOTE
· History of but has been 130s 140s  Currently 65,000 trended down from admission  INR is normal no evidence or history of liver cirrhosis she does have hepatic steatosis  · Could be secondary to bone marrow dysfunction with alcohol abuse will monitor closely will get hemolysis smear she was not on heparin Lovenox within discontinue Lovenox discontinue Toradol    To avoid any increased chance of bleeding  · Hemolysis smear is negative vitamin T44 folic acid are normal trended down today along with leukopenia all improved  · Fibrinogen is normal

## 2021-08-12 NOTE — PLAN OF CARE
Problem: PAIN - ADULT  Goal: Verbalizes/displays adequate comfort level or baseline comfort level  Description: Interventions:  - Encourage patient to monitor pain and request assistance  - Assess pain using appropriate pain scale0-10  - Administer analgesics based on type and severity of pain and evaluate response  - Implement non-pharmacological measures as appropriate and evaluate response  - Consider cultural and social influences on pain and pain management  - Notify physician/advanced practitioner if interventions unsuccessful or patient reports new pain  Outcome: Progressing     Problem: INFECTION - ADULT  Goal: Absence or prevention of progression during hospitalization  Description: INTERVENTIONS:  - Assess and monitor for signs and symptoms of infection  - Monitor lab/diagnostic results  - Monitor all insertion sites, i e  indwelling lines, tubes, and drains  - Monitor endotracheal if appropriate and nasal secretions for changes in amount and color  - [place] appropriate cooling/warming therapies per order  - Administer medications as ordered  - Instruct and encourage patient and family to use good hand hygiene technique  - Identify and instruct in appropriate isolation precautions for identified infection/condition  Outcome: Progressing     Problem: SAFETY ADULT  Goal: Patient will remain free of falls  Description: INTERVENTIONS:  - Educate patient/family on patient safety including physical limitations  - Instruct patient to call for assistance with activity   - Consult OT/PT to assist with strengthening/mobility   - Keep Call bell within reach  - Keep bed low and locked with side rails adjusted as appropriate  - Keep care items and personal belongings within reach  - Initiate and maintain comfort rounds  - Make Fall Risk Sign visible to staff  - Offer Toileting every   Hours, in advance of need  - Initiate/Maintain   alarm  - Obtain necessary fall risk management equipment:     - Apply yellow socks and bracelet for high fall risk patients  - Consider moving patient to room near nurses station  Outcome: Progressing  Goal: Maintain or return to baseline ADL function  Description: INTERVENTIONS:  -  Assess patient's ability to carry out ADLs; assess patient's baseline for ADL function and identify physical deficits which impact ability to perform ADLs (bathing, care of mouth/teeth, toileting, grooming, dressing, etc )  - Assess/evaluate cause of self-care deficits   - Assess range of motion  - Assess patient's mobility; develop plan if impaired  - Assess patient's need for assistive devices and provide as appropriate  - Encourage maximum independence but intervene and supervise when necessary  - Involve family in performance of ADLs  - Assess for home care needs following discharge   - Consider OT consult to assist with ADL evaluation and planning for discharge  - Provide patient education as appropriate  Outcome: Progressing  Goal: Maintains/Returns to pre admission functional level  Description: INTERVENTIONS:  - Perform BMAT or MOVE assessment daily    - Set and communicate daily mobility goal to care team and patient/family/caregiver  - Collaborate with rehabilitation services on mobility goals if consulted  - Perform Range of Motion   times a day  - Reposition patient every   hours  - Dangle patient   times a day  - Stand patient   times a day  - Ambulate patient   times a day  - Out of bed to chair   times a day   - Out of bed for meals    times a day  - Out of bed for toileting  - Record patient progress and toleration of activity level   Outcome: Progressing     Problem: GASTROINTESTINAL - ADULT  Goal: Minimal or absence of nausea and/or vomiting  Description: INTERVENTIONS:  - Administer IV fluids if ordered to ensure adequate hydration  - Maintain NPO status until nausea and vomiting are resolved  - Nasogastric tube if ordered  - Administer ordered antiemetic medications as needed  - Provide nonpharmacologic comfort measures as appropriate  - Advance diet as tolerated, if ordered  - Consider nutrition services referral to assist patient with adequate nutrition and appropriate food choices  Outcome: Progressing  Goal: Maintains or returns to baseline bowel function  Description: INTERVENTIONS:  - Assess bowel function  - Encourage oral fluids to ensure adequate hydration  - Administer IV fluids if ordered to ensure adequate hydration  - Administer ordered medications as needed  - Encourage mobilization and activity  - Consider nutritional services referral to assist patient with adequate nutrition and appropriate food choices  Outcome: Progressing  Goal: Maintains adequate nutritional intake  Description: INTERVENTIONS:  - Monitor percentage of each meal consumed  - Identify factors contributing to decreased intake, treat as appropriate  - Assist with meals as needed  - Monitor I&O, weight, and lab values if indicated  - Obtain nutrition services referral as needed  Outcome: Progressing  Goal: Oral mucous membranes remain intact  Description: INTERVENTIONS  - Assess oral mucosa and hygiene practices  - Implement preventative oral hygiene regimen  - Implement oral medicated treatments as ordered  - Initiate Nutrition services referral as needed  Outcome: Progressing

## 2021-08-12 NOTE — ASSESSMENT & PLAN NOTE
· With AST greater than ALT suspect alcoholic hepatitis  AST slightly went up again but ALT is normal with a tubular be normal   Suggest re-evaluate LFTs in 1 week and avoid drinking

## 2021-08-12 NOTE — DISCHARGE SUMMARY
[address]  Discharge- Zoe Headings 1980, 38 y o  female MRN: [id number]  Unit/Bed#: -01 Encounter: [id number]  Primary Care Provider: Saira Fitzgerald MD   Date and time admitted to hospital: 8/9/2021  8:17 AM    Thrombocytopenia ([address] )  Assessment & Plan  · History of but has been 130s 140s  Currently 65,000 trended down from admission  INR is normal no evidence or history of liver cirrhosis she does have hepatic steatosis  · Could be secondary to bone marrow dysfunction with alcohol abuse will monitor closely will get hemolysis smear she was not on heparin Lovenox within discontinue Lovenox discontinue Toradol  To avoid any increased chance of bleeding  · Hemolysis smear is negative vitamin D88 folic acid are normal trended down today along with leukopenia all improved  · Fibrinogen is normal    Alcohol abuse  Assessment & Plan  Drinks vodka on regular basis, last drink last night  ETOH is 191  Patient has previously underwent withdrawal patient had last drink Sunday she is currently starting to have withdrawals with tremors in the upper extremities start Librium 5 mg p o  T i d  To avoid going further improving  Will discharge on Librium 5 mg b i d  For 2 days and then 5 mg daily for 2 days  Hyponatremia  Assessment & Plan  · Most likely secondary to vomiting secondary to alcohol abuse resolved with p o  Intake and IV fluids  Cystitis  Assessment & Plan  Switched to Keflex 500 mg q 6 hours for 5 days  Polymicrobial but both sensitive to Ancef    Transaminitis  Assessment & Plan  · With AST greater than ALT suspect alcoholic hepatitis  AST slightly went up again but ALT is normal with a tubular be normal   Suggest re-evaluate LFTs in 1 week and avoid drinking      Tobacco abuse  Assessment & Plan  Continue Nicoderm patch    Essential hypertension  Assessment & Plan  · Blood pressure stable continue lisinopril 5 mg daily    High anion gap metabolic acidosis  Assessment & Plan  · Secondary to lactic acidosis and alcohol use has resolved      * Alcohol-induced acute pancreatitis without infection or necrosis  Assessment & Plan  · Lipase down to 700 stopping tolerating diet discussed with her to avoid drinking alcohol  · Discussed regarding cessation of alcohol  · Ultrasound of the gallbladder no evidence of choledocholithiasis or cholecystitis        Medical Problems     Resolved Problems  Date Reviewed: 8/12/2021    None              Discharging Physician / Practitioner: Andie Chino MD  PCP: Mel Paredes MD  Admission Date:   Admission Orders (From admission, onward)     Ordered        08/09/21 0922  Inpatient Admission  Once                   Discharge Date: 08/12/21    Consultations During Hospital Stay:  · none    Procedures Performed:   · none    Significant Findings / Test Results:   · High anion gap metabolic acidosis  · Thrombocytopenia  · Chest x-ray normal  · CT abdomen and pelvis- Hepatic steatosis      Mild thickening of the ascending colon suggesting colitis      Scattered diverticuli without evidence for acute diverticulosis      Multiple right ovarian cysts measuring up to 2 1 x 1 7 cm      ·  Ultrasound of the gallbladder-Moderate hepatic steatosis      Right renal cysts  Incidental Findings:   · See above     Test Results Pending at Discharge (will require follow up):   · none     Outpatient Tests Requested:  · none    Complications:  none    Reason for Admission:  Vomiting    Hospital Course:   Rashaun Valenzuela is a 38 y o  female patient who originally presented to the hospital on 8/9/2021 due to alcohol-induced pancreatitis  With high anion gap metabolic acidosis  Patient is a chronic drinker of hard liquor  Placed NPO no gallstones evident on the ultrasound of the gallbladder    IV fluids with resolution of pancreatitis with the lipase going down to 700 and abdominal been tolerating diet patient had multiple withdrawals placed on Librium and the residuals have improved her discharged on a Librium taper  High anion gap metabolic acidosis resolved patient declined going to rehab  She stated that she is not going to drink anymore  Patient was found to have acute cystitis with initial Keflex as an outpatient  Has some alcoholic hepatitis with AST initially going down but then slightly went up into the low 200s normal T bili normal INC are normal ALT recommendation is to follow up with PCP and repeat a CMP in 1 week  Thrombocytopenia with leukopenia suspect his bone marrow suppression from alcohol use  Ultrasound done she does have hepatic steatosis but no evidence of cirrhosis on the ultrasound  An INR was normal  Thrombocytopenia improved back to [id number]        Please see above list of diagnoses and related plan for additional information  Condition at Discharge: stable    Discharge Day Visit / Exam:   Subjective:  Patient seen and examined denies any chest pain or shortness of breath denies any abdominal pain nausea vomiting tolerating diet  Vitals: Blood Pressure: 121/84 (08/12/21 0804)  Pulse: 84 (08/12/21 0804)  Temperature: 98 5 °F (36 9 °C) (08/12/21 0804)  Temp Source: Oral (08/10/21 1020)  Respirations: 19 (08/12/21 0804)  Height: 4' 11" (149 9 cm) (08/09/21 0820)  Weight - Scale: 50 3 kg (111 lb) (08/12/21 0600)  SpO2: 97 % (08/12/21 0804)  Exam:   Physical Exam  Vitals and nursing note reviewed  Constitutional:       General: She is not in acute distress  Appearance: She is well-developed  HENT:      Head: Normocephalic and atraumatic  Eyes:      Conjunctiva/sclera: Conjunctivae normal    Cardiovascular:      Rate and Rhythm: Normal rate and regular rhythm  Heart sounds: No murmur heard  Pulmonary:      Effort: Pulmonary effort is normal  No respiratory distress  Breath sounds: Normal breath sounds  No wheezing or rales  Abdominal:      General: There is no distension        Palpations: Abdomen is soft       Tenderness: There is no abdominal tenderness  Musculoskeletal:         General: No swelling  Cervical back: Neck supple  Skin:     General: Skin is warm and dry  Neurological:      General: No focal deficit present  Mental Status: She is alert and oriented to person, place, and time  Mental status is at baseline  Psychiatric:         Mood and Affect: Mood normal          Discussion with Family: Patient declined call to   Discharge instructions/Information to patient and family:   See after visit summary for information provided to patient and family  Provisions for Follow-Up Care:  See after visit summary for information related to follow-up care and any pertinent home health orders  Disposition:   Home    Planned Readmission: no     Discharge Statement:  I spent >35 minutes discharging the patient  This time was spent on the day of discharge  I had direct contact with the patient on the day of discharge  Greater than 50% of the total time was spent examining patient, answering all patient questions, arranging and discussing plan of care with patient as well as directly providing post-discharge instructions  Additional time then spent on discharge activities  Discharge Medications:  See after visit summary for reconciled discharge medications provided to patient and/or family        **Please Note: This note may have been constructed using a voice recognition system**

## 2021-08-12 NOTE — PLAN OF CARE
Problem: PAIN - ADULT  Goal: Verbalizes/displays adequate comfort level or baseline comfort level  Description: Interventions:  - Encourage patient to monitor pain and request assistance  - Assess pain using appropriate pain scale0-10  - Administer analgesics based on type and severity of pain and evaluate response  - Implement non-pharmacological measures as appropriate and evaluate response  - Consider cultural and social influences on pain and pain management  - Notify physician/advanced practitioner if interventions unsuccessful or patient reports new pain  Outcome: Progressing     Problem: INFECTION - ADULT  Goal: Absence or prevention of progression during hospitalization  Description: INTERVENTIONS:  - Assess and monitor for signs and symptoms of infection  - Monitor lab/diagnostic results  - Monitor all insertion sites, i e  indwelling lines, tubes, and drains  - Monitor endotracheal if appropriate and nasal secretions for changes in amount and color  - [place] appropriate cooling/warming therapies per order  - Administer medications as ordered  - Instruct and encourage patient and family to use good hand hygiene technique  - Identify and instruct in appropriate isolation precautions for identified infection/condition  Outcome: Progressing     Problem: SAFETY ADULT  Goal: Patient will remain free of falls  Description: INTERVENTIONS:  - Educate patient/family on patient safety including physical limitations  - Instruct patient to call for assistance with activity   - Consult OT/PT to assist with strengthening/mobility   - Keep Call bell within reach  - Keep bed low and locked with side rails adjusted as appropriate  - Keep care items and personal belongings within reach  - Initiate and maintain comfort rounds  - Make Fall Risk Sign visible to staff  - Offer Toileting every   Hours, in advance of need  - Initiate/Maintain   alarm  - Obtain necessary fall risk management equipment:     - Apply yellow socks and bracelet for high fall risk patients  - Consider moving patient to room near nurses station  Outcome: Progressing  Goal: Maintain or return to baseline ADL function  Description: INTERVENTIONS:  -  Assess patient's ability to carry out ADLs; assess patient's baseline for ADL function and identify physical deficits which impact ability to perform ADLs (bathing, care of mouth/teeth, toileting, grooming, dressing, etc )  - Assess/evaluate cause of self-care deficits   - Assess range of motion  - Assess patient's mobility; develop plan if impaired  - Assess patient's need for assistive devices and provide as appropriate  - Encourage maximum independence but intervene and supervise when necessary  - Involve family in performance of ADLs  - Assess for home care needs following discharge   - Consider OT consult to assist with ADL evaluation and planning for discharge  - Provide patient education as appropriate  Outcome: Progressing  Goal: Maintains/Returns to pre admission functional level  Description: INTERVENTIONS:  - Perform BMAT or MOVE assessment daily    - Set and communicate daily mobility goal to care team and patient/family/caregiver  - Collaborate with rehabilitation services on mobility goals if consulted  - Perform Range of Motion   times a day  - Reposition patient every   hours  - Dangle patient   times a day  - Stand patient   times a day  - Ambulate patient   times a day  - Out of bed to chair   times a day   - Out of bed for meals    times a day  - Out of bed for toileting  - Record patient progress and toleration of activity level   Outcome: Progressing     Problem: DISCHARGE PLANNING  Goal: Discharge to home or other facility with appropriate resources  Description: INTERVENTIONS:  - Identify barriers to discharge w/patient and caregiver  - Arrange for needed discharge resources and transportation as appropriate  - Identify discharge learning needs (meds, wound care, etc )  - Arrange for interpretive services to assist at discharge as needed  - Refer to Case Management Department for coordinating discharge planning if the patient needs post-hospital services based on physician/advanced practitioner order or complex needs related to functional status, cognitive ability, or social support system  Outcome: Progressing     Problem: Knowledge Deficit  Goal: Patient/family/caregiver demonstrates understanding of disease process, treatment plan, medications, and discharge instructions  Description: Complete learning assessment and assess knowledge base    Interventions:  - Provide teaching at level of understanding  - Provide teaching via preferred learning methods  Outcome: Progressing     Problem: RESPIRATORY - ADULT  Goal: Achieves optimal ventilation and oxygenation  Description: INTERVENTIONS:  - Assess for changes in respiratory status  - Assess for changes in mentation and behavior  - Position to facilitate oxygenation and minimize respiratory effort  - Oxygen administered by appropriate delivery if ordered  - Initiate smoking cessation education as indicated  - Encourage broncho-pulmonary hygiene including cough, deep breathe, Incentive Spirometry  - Assess the need for suctioning and aspirate as needed  - Assess and instruct to report SOB or any respiratory difficulty  - Respiratory Therapy support as indicated  Outcome: Progressing     Problem: GASTROINTESTINAL - ADULT  Goal: Minimal or absence of nausea and/or vomiting  Description: INTERVENTIONS:  - Administer IV fluids if ordered to ensure adequate hydration  - Maintain NPO status until nausea and vomiting are resolved  - Nasogastric tube if ordered  - Administer ordered antiemetic medications as needed  - Provide nonpharmacologic comfort measures as appropriate  - Advance diet as tolerated, if ordered  - Consider nutrition services referral to assist patient with adequate nutrition and appropriate food choices  Outcome: Progressing  Goal: Maintains or returns to baseline bowel function  Description: INTERVENTIONS:  - Assess bowel function  - Encourage oral fluids to ensure adequate hydration  - Administer IV fluids if ordered to ensure adequate hydration  - Administer ordered medications as needed  - Encourage mobilization and activity  - Consider nutritional services referral to assist patient with adequate nutrition and appropriate food choices  Outcome: Progressing  Goal: Maintains adequate nutritional intake  Description: INTERVENTIONS:  - Monitor percentage of each meal consumed  - Identify factors contributing to decreased intake, treat as appropriate  - Assist with meals as needed  - Monitor I&O, weight, and lab values if indicated  - Obtain nutrition services referral as needed  Outcome: Progressing  Goal: Oral mucous membranes remain intact  Description: INTERVENTIONS  - Assess oral mucosa and hygiene practices  - Implement preventative oral hygiene regimen  - Implement oral medicated treatments as ordered  - Initiate Nutrition services referral as needed  Outcome: Progressing     Problem: METABOLIC, FLUID AND ELECTROLYTES - ADULT  Goal: Electrolytes maintained within normal limits  Description: INTERVENTIONS:  - Monitor labs and assess patient for signs and symptoms of electrolyte imbalances  - Administer electrolyte replacement as ordered  - Monitor response to electrolyte replacements, including repeat lab results as appropriate  - Instruct patient on fluid and nutrition as appropriate  Outcome: Progressing  Goal: Fluid balance maintained  Description: INTERVENTIONS:  - Monitor labs   - Monitor I/O and WT  - Instruct patient on fluid and nutrition as appropriate  - Assess for signs & symptoms of volume excess or deficit  Outcome: Progressing  Goal: Glucose maintained within target range  Description: INTERVENTIONS:  - Monitor Blood Glucose as ordered  - Assess for signs and symptoms of hyperglycemia and hypoglycemia  - Administer ordered medications to maintain glucose within target range  - Assess nutritional intake and initiate nutrition service referral as needed  Outcome: Progressing     Problem: Potential for Falls  Goal: Patient will remain free of falls  Description: INTERVENTIONS:  - Educate patient/family on patient safety including physical limitations  - Instruct patient to call for assistance with activity   - Consult OT/PT to assist with strengthening/mobility   - Keep Call bell within reach  - Keep bed low and locked with side rails adjusted as appropriate  - Keep care items and personal belongings within reach  - Initiate and maintain comfort rounds  - Make Fall Risk Sign visible to staff  - Offer Toileting every   Hours, in advance of need  - Initiate/Maintain   alarm  - Obtain necessary fall risk management equipment:      - Apply yellow socks and bracelet for high fall risk patients  - Consider moving patient to room near nurses station  Outcome: Progressing

## 2021-08-12 NOTE — NURSING NOTE
Iv site d/c'd  Avs reviewed with pt at bedside  Follow up with Pcp  Medications to be picked up at pharmacy  Work note provided  Verbalized understanding  No further questions or concerns

## 2021-08-13 LAB
BACTERIA UR CULT: ABNORMAL

## 2021-08-14 LAB
BACTERIA BLD CULT: NORMAL
BACTERIA BLD CULT: NORMAL

## 2021-09-25 ENCOUNTER — HOSPITAL ENCOUNTER (EMERGENCY)
Facility: HOSPITAL | Age: 41
Discharge: LEFT BEFORE DISCHARGE | End: 2021-09-25
Attending: EMERGENCY MEDICINE | Admitting: EMERGENCY MEDICINE
Payer: COMMERCIAL

## 2021-09-25 VITALS
BODY MASS INDEX: 22.82 KG/M2 | WEIGHT: 113 LBS | OXYGEN SATURATION: 97 % | HEART RATE: 86 BPM | SYSTOLIC BLOOD PRESSURE: 119 MMHG | DIASTOLIC BLOOD PRESSURE: 84 MMHG | RESPIRATION RATE: 18 BRPM | TEMPERATURE: 98.8 F

## 2021-09-25 DIAGNOSIS — K59.00 CONSTIPATION: Primary | ICD-10-CM

## 2021-09-25 PROCEDURE — 99284 EMERGENCY DEPT VISIT MOD MDM: CPT

## 2021-09-25 PROCEDURE — 93005 ELECTROCARDIOGRAM TRACING: CPT

## 2021-09-25 PROCEDURE — 99284 EMERGENCY DEPT VISIT MOD MDM: CPT | Performed by: EMERGENCY MEDICINE

## 2021-09-25 RX ORDER — KETOROLAC TROMETHAMINE 30 MG/ML
15 INJECTION, SOLUTION INTRAMUSCULAR; INTRAVENOUS ONCE
Status: DISCONTINUED | OUTPATIENT
Start: 2021-09-25 | End: 2021-09-25

## 2021-09-25 RX ORDER — SODIUM CHLORIDE 9 MG/ML
3 INJECTION INTRAVENOUS
Status: DISCONTINUED | OUTPATIENT
Start: 2021-09-25 | End: 2021-09-25

## 2021-09-25 RX ORDER — POLYETHYLENE GLYCOL 3350 17 G/17G
17 POWDER, FOR SOLUTION ORAL ONCE
Status: DISCONTINUED | OUTPATIENT
Start: 2021-09-25 | End: 2021-09-25

## 2021-09-25 RX ORDER — DOCUSATE SODIUM 100 MG/1
100 CAPSULE, LIQUID FILLED ORAL ONCE
Status: DISCONTINUED | OUTPATIENT
Start: 2021-09-25 | End: 2021-09-25

## 2021-09-26 LAB
ATRIAL RATE: 92 BPM
P AXIS: 59 DEGREES
PR INTERVAL: 144 MS
QRS AXIS: 71 DEGREES
QRSD INTERVAL: 88 MS
QT INTERVAL: 378 MS
QTC INTERVAL: 467 MS
T WAVE AXIS: 51 DEGREES
VENTRICULAR RATE: 92 BPM

## 2022-05-02 NOTE — ASSESSMENT & PLAN NOTE
· Patient on Escitalopram at home - continue [Time Spent: ___ minutes] : I have spent [unfilled] minutes of time on the encounter.

## 2022-05-03 ENCOUNTER — HOSPITAL ENCOUNTER (EMERGENCY)
Facility: HOSPITAL | Age: 42
Discharge: HOME | End: 2022-05-03
Attending: EMERGENCY MEDICINE | Admitting: EMERGENCY MEDICINE
Payer: COMMERCIAL

## 2022-05-03 VITALS
TEMPERATURE: 97.5 F | WEIGHT: 141 LBS | SYSTOLIC BLOOD PRESSURE: 133 MMHG | HEART RATE: 91 BPM | HEIGHT: 59 IN | OXYGEN SATURATION: 97 % | BODY MASS INDEX: 28.43 KG/M2 | RESPIRATION RATE: 20 BRPM | DIASTOLIC BLOOD PRESSURE: 96 MMHG

## 2022-05-03 DIAGNOSIS — U07.1 COVID-19: Primary | ICD-10-CM

## 2022-05-03 LAB
FLUAV RNA RESP QL NAA+PROBE: NEGATIVE
FLUBV RNA RESP QL NAA+PROBE: NEGATIVE
RSV RNA RESP QL NAA+PROBE: NEGATIVE
SARS-COV-2 RNA RESP QL NAA+PROBE: POSITIVE

## 2022-05-03 PROCEDURE — 99283 EMERGENCY DEPT VISIT LOW MDM: CPT

## 2022-05-03 PROCEDURE — 0241U HB NFCT DS VIR RESP RNA 4 TRGT: CPT | Performed by: EMERGENCY MEDICINE

## 2022-05-03 PROCEDURE — 99284 EMERGENCY DEPT VISIT MOD MDM: CPT | Performed by: EMERGENCY MEDICINE

## 2022-05-03 NOTE — ED PROVIDER NOTES
History  Chief Complaint   Patient presents with    COVID-19 Exposure     Pt reports having family members test positive for covid this week, pt reports starting with chills/sore throat/cough yesterday      Body aches, weakness, nausea, runny nose, cough, sore throat, chills, SOB, diarrhea  No abd pain    Multiple family members tested COVID positive  Not COVID vaccinated    Hx asthma, pt smokes      History provided by:  Patient   used: No    Flu Symptoms  Presenting symptoms: cough, diarrhea, fatigue, headache, myalgias, nausea, rhinorrhea, shortness of breath and sore throat    Presenting symptoms: no fever and no vomiting    Severity:  Moderate  Onset quality:  Gradual  Duration:  2 days  Progression:  Unchanged  Chronicity:  New  Relieved by:  Nothing  Worsened by:  Nothing  Ineffective treatments:  None tried  Associated symptoms: chills and nasal congestion    Associated symptoms: no decreased appetite, no decrease in physical activity, no ear pain, no mental status change, no neck stiffness and no syncope        Prior to Admission Medications   Prescriptions Last Dose Informant Patient Reported? Taking?   chlordiazePOXIDE (LIBRIUM) 5 mg capsule   No No   Sig: Take 1 capsule (5 mg total) by mouth 2 (two) times a day for 2 days, THEN 1 capsule (5 mg total) daily for 2 days     folic acid (FOLVITE) 1 mg tablet   No No   Sig: Take 1 tablet (1 mg total) by mouth daily   lisinopril (ZESTRIL) 5 mg tablet   Yes No   Sig: Take 5 mg by mouth daily   magnesium 30 MG tablet   Yes No   Sig: Take 30 mg by mouth daily Unknown dosage    potassium chloride (K-DUR,KLOR-CON) 20 mEq tablet   Yes No   Sig: Take 20 mEq by mouth daily      Facility-Administered Medications: None       Past Medical History:   Diagnosis Date    Alcohol abuse     Asthma     Fatty liver     Hypertension     Seizure ([facility] [address] )     Tubal ligation status        Past Surgical History:   Procedure Laterality Date    ANKLE SURGERY  BREAST BIOPSY      SALPINGOOPHORECTOMY      TUBAL LIGATION         Family History   Problem Relation Age of Onset    Hypertension Father      I have reviewed and agree with the history as documented  E-Cigarette/Vaping    E-Cigarette Use Current Every Day User      E-Cigarette/Vaping Substances    THC Yes      Social History     Tobacco Use    Smoking status: Current Every Day Smoker     Packs/day: 0 50     Types: Cigarettes    Smokeless tobacco: Never Used   Vaping Use    Vaping Use: Every day    Substances: THC   Substance Use Topics    Alcohol use: Yes     Comment: One pint of vodka per day    Drug use: Yes     Types: Marijuana       Review of Systems   Constitutional: Positive for chills and fatigue  Negative for decreased appetite and fever  HENT: Positive for congestion, rhinorrhea and sore throat  Negative for ear pain, hearing loss, trouble swallowing and voice change  Eyes: Negative for pain and discharge  Respiratory: Positive for cough and shortness of breath  Negative for wheezing  Cardiovascular: Negative for chest pain and palpitations  Gastrointestinal: Positive for diarrhea and nausea  Negative for abdominal pain, blood in stool, constipation and vomiting  Genitourinary: Negative for dysuria, flank pain, frequency and hematuria  Musculoskeletal: Positive for myalgias  Negative for joint swelling, neck pain and neck stiffness  Skin: Negative for rash and wound  Neurological: Positive for headaches  Negative for dizziness, seizures, syncope and facial asymmetry  Psychiatric/Behavioral: Negative for hallucinations, self-injury and suicidal ideas  All other systems reviewed and are negative  Physical Exam  Physical Exam  Vitals and nursing note reviewed  Constitutional:       General: She is not in acute distress  Appearance: She is well-developed  HENT:      Head: Normocephalic and atraumatic        Right Ear: External ear normal       Left Ear: External ear normal    Eyes:      General: No scleral icterus  Right eye: No discharge  Left eye: No discharge  Extraocular Movements: Extraocular movements intact  Conjunctiva/sclera: Conjunctivae normal    Cardiovascular:      Rate and Rhythm: Normal rate and regular rhythm  Heart sounds: Normal heart sounds  No murmur heard  Pulmonary:      Effort: Pulmonary effort is normal       Breath sounds: Wheezing (mild) present  No rales  Abdominal:      General: Bowel sounds are normal  There is no distension  Palpations: Abdomen is soft  Tenderness: There is no abdominal tenderness  There is no guarding or rebound  Musculoskeletal:         General: No deformity  Normal range of motion  Cervical back: Normal range of motion and neck supple  Skin:     General: Skin is warm and dry  Findings: No rash  Neurological:      General: No focal deficit present  Mental Status: She is alert and oriented to person, place, and time  Cranial Nerves: No cranial nerve deficit  Psychiatric:         Mood and Affect: Mood normal          Behavior: Behavior normal          Thought Content: Thought content normal          Judgment: Judgment normal          Vital Signs  ED Triage Vitals [05/03/22 0653]   Temperature Pulse Respirations Blood Pressure SpO2   97 5 °F (36 4 °C) 91 20 133/96 97 %      Temp Source Heart Rate Source Patient Position - Orthostatic VS BP Location FiO2 (%)   Temporal Monitor Sitting Right arm --      Pain Score       --           Vitals:    05/03/22 0653   BP: 133/96   Pulse: 91   Patient Position - Orthostatic VS: Sitting         Visual Acuity      ED Medications  Medications - No data to display    Diagnostic Studies  Results Reviewed     Procedure Component Value Units Date/Time    COVID19, Influenza A/B, RSV PCR, [facility] [[id number]] Collected: 05/03/22 0715    Lab Status:  In process Specimen: Nares from Nose Updated: 05/03/22 7409 No orders to display              Procedures  Procedures         ED Course                               SBIRT 22yo+      Most Recent Value   SBIRT (22 yo +)    In order to provide better care to our patients, we are screening all of our patients for alcohol and drug use  Would it be okay to ask you these screening questions? No Filed at: 05/03/2022 5343                    [facility]  Number of Diagnoses or Management Options  COVID-19  Diagnosis management comments: Temperature 98 4° orally  No tachycardia or hypoxia  Patient with benign physical examination  Symptoms entirely consistent with mild case of COVID given family exposures and patient's symptoms  COVID swab sent and patient will be contacted with results  Stable for discharge at this time  Patient advised to quarantine appropriately      Disposition  Final diagnoses:   [id number]     Time reflects when diagnosis was documented in both MDM as applicable and the Disposition within this note     Time User Action Codes Description Comment    5/3/2022  7:32 AM Anders Avila Add [U07 1] COVID-19       ED Disposition     ED Disposition Condition Date/Time Comment    Discharge Stable Tue May 3, 2022  7:31 AM Beck Pierre discharge to home/self care  Follow-up Information     Follow up With Specialties Details Why Contact Info    Danitza Trujillo MD Internal Medicine, Cardiology   [address] [place] [address]  [phone]            Discharge Medication List as of 5/3/2022  7:35 AM      CONTINUE these medications which have NOT CHANGED    Details   chlordiazePOXIDE (LIBRIUM) 5 mg capsule Multiple Dosages:Starting Thu 8/12/2021, Until Fri 8/13/2021 at 2359, THEN Starting Sat 8/14/2021, Until Sun 8/15/2021 at 2359Take 1 capsule (5 mg total) by mouth 2 (two) times a day for 2 days, THEN 1 capsule (5 mg total) daily for 2 days  , Normal      folic acid (FOLVITE) 1 mg tablet Take 1 tablet (1 mg total) by mouth daily, Starting Tue 7/7/2020, Normal      lisinopril (ZESTRIL) 5 mg tablet Take 5 mg by mouth daily, Starting Tue 11/26/2019, Historical Med      magnesium 30 MG tablet Take 30 mg by mouth daily Unknown dosage , Historical Med      potassium chloride (K-DUR,KLOR-CON) 20 mEq tablet Take 20 mEq by mouth daily, Starting Tue 12/22/2020, Historical Med             No discharge procedures on file      PDMP Review     None          ED Provider  Electronically Signed by           Evelyn Quintanilla MD  05/03/22 0859

## 2022-05-03 NOTE — DISCHARGE INSTRUCTIONS
[id number] [facility]  [phone] Option #7    Rodrigo Abdullahi hotline  [phone]

## 2022-05-03 NOTE — Clinical Note
José Antonio Miguel Angelalayna was seen and treated in our emergency department on 5/3/2022  Diagnosis:     Mariaelena Score    She may return on this date:     Must quarantine for 10 days from 5/3/22 and until all symptoms resolved, whichever is longer     If you have any questions or concerns, please don't hesitate to call        Miguel Pavon, MD    ______________________________           _______________          _______________  Hospital Representative                              Date                                Time

## 2024-02-21 PROBLEM — R50.9 FEVER: Status: RESOLVED | Noted: 2020-07-06 | Resolved: 2024-02-21

## 2024-09-09 ENCOUNTER — OFFICE VISIT (OUTPATIENT)
Dept: URGENT CARE | Facility: CLINIC | Age: 44
End: 2024-09-09

## 2024-09-09 VITALS
BODY MASS INDEX: 27.27 KG/M2 | TEMPERATURE: 97 F | DIASTOLIC BLOOD PRESSURE: 86 MMHG | RESPIRATION RATE: 18 BRPM | WEIGHT: 135 LBS | OXYGEN SATURATION: 98 % | SYSTOLIC BLOOD PRESSURE: 138 MMHG | HEART RATE: 94 BPM

## 2024-09-09 DIAGNOSIS — M54.12 CERVICAL RADICULOPATHY: Primary | ICD-10-CM

## 2024-09-09 PROCEDURE — G0382 LEV 3 HOSP TYPE B ED VISIT: HCPCS

## 2024-09-09 RX ORDER — ALBUTEROL SULFATE 0.83 MG/ML
2.5 SOLUTION RESPIRATORY (INHALATION) EVERY 4 HOURS PRN
COMMUNITY
Start: 2024-04-26 | End: 2025-04-26

## 2024-09-09 RX ORDER — METHYLPREDNISOLONE 4 MG
TABLET, DOSE PACK ORAL
Qty: 21 EACH | Refills: 0 | Status: SHIPPED | OUTPATIENT
Start: 2024-09-09

## 2024-09-09 NOTE — PATIENT INSTRUCTIONS
Take steroid as prescribed   Continue heat and ice as needed  Light stretching and massage as able   Rest   Follow up with PCP in 3-5 days.  Proceed to  ER if symptoms worsen.    If tests are performed, our office will contact you with results only if changes need to made to the care plan discussed with you at the visit. You can review your full results on [facility].

## 2024-09-09 NOTE — PROGRESS NOTES
[facility]        NAME: Sylvia Louis is a 43 y.o. female  : 1980    MRN: [id number]  DATE: 2024  TIME: 5:47 PM    Assessment and Plan   Cervical radiculopathy [M54.12]  1. Cervical radiculopathy  methylPREDNISolone 4 MG tablet therapy pack        Symptoms appear most consistent with cervical radiculopathy.  Will prescribe steroid since ibuprofen is not helping.  Patient declined physical therapy due to not having insurance.  At home neck stretch exercises provided.  Patient also declined Toradol in office today due to not liking needles.  Went over signs symptoms and when to proceed to ER.  Patient verbalized understanding.     Patient Instructions     Take steroid as prescribed   Continue heat and ice as needed  Light stretching and massage as able   Rest   Follow up with PCP in 3-5 days.  Proceed to  ER if symptoms worsen.    If tests are performed, our office will contact you with results only if changes need to made to the care plan discussed with you at the visit. [facility]    Chief Complaint     Chief Complaint   Patient presents with    Neck Pain     Neck pain radiating to right arm and hand. No injury. Pain x 1 month         History of Present Illness       Patient is presenting for neck pain rating to right arm and hand x 1 month.  She denies any injury.  She states she has numbness and tingling down her arm into her fingers on the right side.  She denies any headaches or vision changes.  She denies any fevers, chills, or flulike symptoms.    Neck Pain         Review of Systems   Review of Systems   Constitutional: Negative.    Respiratory: Negative.     Cardiovascular: Negative.    Musculoskeletal:  Positive for neck pain and neck stiffness.   Skin: Negative.    Neurological: Negative.          Current Medications       Current Outpatient Medications:     albuterol (2.5 mg/3 mL) 0.083 % nebulizer solution, Inhale 2.5 mg every 4  (four) hours as needed, Disp: , Rfl:     lisinopril (ZESTRIL) 5 mg tablet, Take 5 mg by mouth daily, Disp: , Rfl:     methylPREDNISolone 4 MG tablet therapy pack, Use as directed on package, Disp: 21 each, Rfl: 0    NON FORMULARY, Medical marijuana, Disp: , Rfl:     magnesium 30 MG tablet, Take 30 mg by mouth daily Unknown dosage  (Patient not taking: Reported on 9/9/2024), Disp: , Rfl:     potassium chloride (K-DUR,KLOR-CON) 20 mEq tablet, Take 20 mEq by mouth daily (Patient not taking: Reported on 9/9/2024), Disp: , Rfl:     Current Allergies     Allergies as of 09/09/2024 - Reviewed 09/09/2024   Allergen Reaction Noted    Tylenol [acetaminophen] Other (See Comments) 07/05/2020    Medical tape Rash 03/04/2021            The following portions of the patient's history were reviewed and updated as appropriate: allergies, current medications, past family history, past medical history, past social history, past surgical history and problem list.     Past Medical History:   Diagnosis Date    Alcohol abuse     Anxiety     Asthma     Fatty liver     Hypertension     Seizure (HCC)     Tubal ligation status        Past Surgical History:   Procedure Laterality Date    ANKLE SURGERY      BREAST BIOPSY      MAMMO NEEDLE LOCALIZATION LEFT (ALL INC) Left 3/19/2020    SALPINGOOPHORECTOMY      TUBAL LIGATION         Family History   Problem Relation Age of Onset    Heart disease Mother     Hypertension Father          Medications have been verified.        Objective   /86   Pulse 94   Temp (!) 97 °F (36.1 °C)   Resp 18   Wt 61.2 kg (135 lb)   LMP 11/26/2020   SpO2 98%   BMI 27.27 kg/m²        Physical Exam     Physical Exam  Constitutional:       Appearance: Normal appearance.   Cardiovascular:      Rate and Rhythm: Normal rate and regular rhythm.      Pulses: Normal pulses.      Heart sounds: Normal heart sounds.   Pulmonary:      Effort: Pulmonary effort is normal.      Breath sounds: Normal breath sounds.    Musculoskeletal:         General: No swelling, tenderness, deformity or signs of injury.      Comments: Limited neck and right shoulder range of motion due to pain.  No cervical bony tenderness.   Skin:     General: Skin is warm.      Capillary Refill: Capillary refill takes less than 2 seconds.   Neurological:      General: No focal deficit present.      Mental Status: She is alert and oriented to person, place, and time. Mental status is at baseline.